# Patient Record
Sex: MALE | Race: WHITE | NOT HISPANIC OR LATINO | Employment: FULL TIME | URBAN - METROPOLITAN AREA
[De-identification: names, ages, dates, MRNs, and addresses within clinical notes are randomized per-mention and may not be internally consistent; named-entity substitution may affect disease eponyms.]

---

## 2017-08-22 ENCOUNTER — HOSPITAL ENCOUNTER (EMERGENCY)
Facility: HOSPITAL | Age: 37
Discharge: HOME | End: 2017-08-22
Attending: EMERGENCY MEDICINE
Payer: MEDICAID

## 2017-08-22 VITALS
DIASTOLIC BLOOD PRESSURE: 82 MMHG | HEIGHT: 72 IN | RESPIRATION RATE: 14 BRPM | SYSTOLIC BLOOD PRESSURE: 141 MMHG | TEMPERATURE: 98 F | OXYGEN SATURATION: 99 % | BODY MASS INDEX: 28.44 KG/M2 | HEART RATE: 75 BPM | WEIGHT: 210 LBS

## 2017-08-22 DIAGNOSIS — M75.42 IMPINGEMENT SYNDROME OF LEFT SHOULDER: Primary | ICD-10-CM

## 2017-08-22 DIAGNOSIS — M25.512 ACUTE PAIN OF LEFT SHOULDER: ICD-10-CM

## 2017-08-22 PROCEDURE — 96372 THER/PROPH/DIAG INJ SC/IM: CPT

## 2017-08-22 PROCEDURE — 63600175 PHARM REV CODE 636 W HCPCS: Performed by: PHYSICIAN ASSISTANT

## 2017-08-22 PROCEDURE — 99283 EMERGENCY DEPT VISIT LOW MDM: CPT | Mod: 25

## 2017-08-22 RX ORDER — CLONAZEPAM 2 MG/1
2 TABLET ORAL 2 TIMES DAILY
Status: ON HOLD | COMMUNITY
End: 2023-02-28 | Stop reason: HOSPADM

## 2017-08-22 RX ORDER — DICLOFENAC SODIUM 75 MG/1
75 TABLET, DELAYED RELEASE ORAL 2 TIMES DAILY
Qty: 30 TABLET | Refills: 0 | Status: SHIPPED | OUTPATIENT
Start: 2017-08-22 | End: 2017-10-03

## 2017-08-22 RX ORDER — KETOROLAC TROMETHAMINE 30 MG/ML
30 INJECTION, SOLUTION INTRAMUSCULAR; INTRAVENOUS
Status: COMPLETED | OUTPATIENT
Start: 2017-08-22 | End: 2017-08-22

## 2017-08-22 RX ORDER — TIZANIDINE 4 MG/1
4 TABLET ORAL EVERY 6 HOURS PRN
Qty: 12 TABLET | Refills: 0 | Status: SHIPPED | OUTPATIENT
Start: 2017-08-22 | End: 2017-09-01

## 2017-08-22 RX ORDER — GABAPENTIN 600 MG/1
600 TABLET ORAL 3 TIMES DAILY
COMMUNITY
End: 2020-09-03 | Stop reason: SDUPTHER

## 2017-08-22 RX ORDER — ORPHENADRINE CITRATE 30 MG/ML
60 INJECTION INTRAMUSCULAR; INTRAVENOUS
Status: COMPLETED | OUTPATIENT
Start: 2017-08-22 | End: 2017-08-22

## 2017-08-22 RX ORDER — BUPRENORPHINE AND NALOXONE 8; 2 MG/1; MG/1
FILM, SOLUBLE BUCCAL; SUBLINGUAL
Status: ON HOLD | COMMUNITY
End: 2023-02-28 | Stop reason: HOSPADM

## 2017-08-22 RX ADMIN — KETOROLAC TROMETHAMINE 30 MG: 30 INJECTION, SOLUTION INTRAMUSCULAR at 08:08

## 2017-08-22 RX ADMIN — ORPHENADRINE CITRATE 60 MG: 30 INJECTION INTRAMUSCULAR; INTRAVENOUS at 08:08

## 2017-08-23 NOTE — ED PROVIDER NOTES
Encounter Date: 8/22/2017       History     Chief Complaint   Patient presents with    Shoulder Pain      awoke with pain in L shoulder 1 week ago and pain persist unable to raise shoulder without pain; denies known injury or trauma     Elder Rosales is a 36 y.o. Male presenting for evaluation of left shoulder pain, persisting for the last week.  Patient states he has a history of previous shoulder problems and has worked in construction over the years.  He states that it was recommended he undergo shoulder surgery, for rotator cuff tear, but he declined.  He denies any new injury, trauma or fall associated with the shoulder pain today.  No fever, no chills.  No numbness, tingling or weakness.  He states he has increased pain with extension of the arm.  He has taken over-the-counter medication with little relief.      The history is provided by the patient.     Review of patient's allergies indicates:  No Known Allergies  Past Medical History:   Diagnosis Date    Anxiety     Carpal tunnel syndrome     Shoulder disorder      History reviewed. No pertinent surgical history.  No family history on file.  Social History   Substance Use Topics    Smoking status: Current Every Day Smoker    Smokeless tobacco: Never Used    Alcohol use Yes      Comment: 2-3x week     Review of Systems   Constitutional: Negative for chills and fever.   Respiratory: Negative for cough, chest tightness, shortness of breath and wheezing.    Cardiovascular: Negative for chest pain and palpitations.   Gastrointestinal: Negative for abdominal pain, diarrhea, nausea and vomiting.   Musculoskeletal: Positive for arthralgias and myalgias. Negative for back pain, joint swelling, neck pain and neck stiffness.   Skin: Negative for color change, pallor, rash and wound.   Neurological: Negative for weakness and numbness.   Hematological: Does not bruise/bleed easily.       Physical Exam     Initial Vitals [08/22/17 1929]   BP Pulse Resp Temp  SpO2   (!) 141/82 75 14 98 °F (36.7 °C) 99 %      MAP       101.67         Physical Exam    Nursing note and vitals reviewed.  Constitutional: He appears well-developed and well-nourished. He is not diaphoretic. No distress.   HENT:   Head: Normocephalic and atraumatic.   Neck: Normal range of motion. Neck supple.   Cardiovascular: Normal rate, regular rhythm, normal heart sounds and intact distal pulses.   Pulmonary/Chest: Breath sounds normal.   Abdominal: Soft. He exhibits no distension and no mass. There is no tenderness.   Musculoskeletal: He exhibits tenderness. He exhibits no edema.        Left shoulder: He exhibits decreased range of motion, tenderness and pain. He exhibits no bony tenderness, no swelling, no effusion, no crepitus, no deformity, no laceration, no spasm, normal pulse and normal strength.        Arms:  Tenderness palpation noted to the anterior aspect of the right shoulder without bony tenderness.  Decreased range of motion with full extension of the left arm, but no decreased strength or loss of sensation to left upper extremity.  Palpable 2+ radial pulse.   Neurological: He is alert and oriented to person, place, and time. He has normal strength. No sensory deficit.   Skin: Skin is warm and dry. No rash and no abscess noted. No erythema.         ED Course   Procedures  Labs Reviewed - No data to display          Medical Decision Making:   Differential Diagnosis:   Fracture  Dislocation  Impingement  Septic joint  Clinical Tests:   Radiological Study: Ordered and Reviewed       APC / Resident Notes:   X-rays are independently interpreted and show no acute abnormalities, fractures or dislocations in the left shoulder.  His symptoms are likely related to a rotator cuff deficiency and impingement syndrome.  He is given a dose of IM Toradol and Norflex here in the emergency department.  He is placed in a sling.  He is discharged home with a prescription for anti-inflammatories and muscle  relaxants.  No narcotics are indicated today.  He is encouraged follow-up with orthopedics for reevaluation of further treatment options.  He voices understanding and is agreeable to the plan.  He is given specific return precautions.  He will likely need follow-up with [facility] and referral sheet is faxed.              ED Course     Clinical Impression:   The primary encounter diagnosis was Impingement syndrome of left shoulder. A diagnosis of Acute pain of left shoulder was also pertinent to this visit.                           Dayanna Abrams PA-C  08/23/17 0120

## 2017-10-03 ENCOUNTER — HOSPITAL ENCOUNTER (OUTPATIENT)
Facility: HOSPITAL | Age: 37
Discharge: LEFT BEFORE DISCHARGE | End: 2017-10-04
Attending: EMERGENCY MEDICINE | Admitting: FAMILY MEDICINE
Payer: MEDICAID

## 2017-10-03 DIAGNOSIS — G92.8 DRUG-INDUCED ENCEPHALOPATHY: ICD-10-CM

## 2017-10-03 DIAGNOSIS — F19.10 POLYSUBSTANCE ABUSE: Primary | ICD-10-CM

## 2017-10-03 PROCEDURE — 99285 EMERGENCY DEPT VISIT HI MDM: CPT | Mod: 25

## 2017-10-03 PROCEDURE — 96360 HYDRATION IV INFUSION INIT: CPT

## 2017-10-03 PROCEDURE — 96361 HYDRATE IV INFUSION ADD-ON: CPT

## 2017-10-03 RX ORDER — OLANZAPINE 10 MG/2ML
10 INJECTION, POWDER, FOR SOLUTION INTRAMUSCULAR ONCE AS NEEDED
Status: DISCONTINUED | OUTPATIENT
Start: 2017-10-04 | End: 2017-10-04 | Stop reason: HOSPADM

## 2017-10-04 VITALS
TEMPERATURE: 97 F | SYSTOLIC BLOOD PRESSURE: 188 MMHG | RESPIRATION RATE: 16 BRPM | DIASTOLIC BLOOD PRESSURE: 97 MMHG | WEIGHT: 225 LBS | HEIGHT: 72 IN | BODY MASS INDEX: 30.48 KG/M2 | OXYGEN SATURATION: 99 % | HEART RATE: 87 BPM

## 2017-10-04 PROBLEM — R00.0 TACHYCARDIA: Status: RESOLVED | Noted: 2017-10-04 | Resolved: 2017-10-04

## 2017-10-04 PROBLEM — F19.921 DRUG INTOXICATION WITH DELIRIUM: Status: ACTIVE | Noted: 2017-10-04

## 2017-10-04 PROBLEM — R00.0 TACHYCARDIA: Status: ACTIVE | Noted: 2017-10-04

## 2017-10-04 PROBLEM — F11.221: Status: ACTIVE | Noted: 2017-10-04

## 2017-10-04 PROBLEM — B18.2 CHRONIC HEPATITIS C VIRUS INFECTION: Status: ACTIVE | Noted: 2017-10-04

## 2017-10-04 PROBLEM — F19.90 IVDU (INTRAVENOUS DRUG USER): Status: ACTIVE | Noted: 2017-10-04

## 2017-10-04 PROBLEM — F19.921 DRUG INTOXICATION WITH DELIRIUM: Status: RESOLVED | Noted: 2017-10-04 | Resolved: 2017-10-04

## 2017-10-04 PROBLEM — F19.10 POLYSUBSTANCE ABUSE: Status: ACTIVE | Noted: 2017-10-04

## 2017-10-04 PROBLEM — G92.8 DRUG-INDUCED ENCEPHALOPATHY: Status: RESOLVED | Noted: 2017-10-04 | Resolved: 2017-10-04

## 2017-10-04 PROBLEM — G92.8 DRUG-INDUCED ENCEPHALOPATHY: Status: ACTIVE | Noted: 2017-10-04

## 2017-10-04 LAB
ALBUMIN SERPL BCP-MCNC: 3.7 G/DL
ALBUMIN SERPL BCP-MCNC: 4.1 G/DL
ALP SERPL-CCNC: 92 U/L
ALP SERPL-CCNC: 98 U/L
ALT SERPL W/O P-5'-P-CCNC: 103 U/L
ALT SERPL W/O P-5'-P-CCNC: 106 U/L
AMMONIA PLAS-SCNC: 56 UMOL/L
AMPHET+METHAMPHET UR QL: NORMAL
ANION GAP SERPL CALC-SCNC: 12 MMOL/L
ANION GAP SERPL CALC-SCNC: 13 MMOL/L
APAP SERPL-MCNC: <3 UG/ML
AST SERPL-CCNC: 93 U/L
AST SERPL-CCNC: 94 U/L
BACTERIA #/AREA URNS HPF: NORMAL /HPF
BARBITURATES UR QL SCN>200 NG/ML: NEGATIVE
BASOPHILS # BLD AUTO: 0 K/UL
BASOPHILS # BLD AUTO: 0.1 K/UL
BASOPHILS NFR BLD: 0.5 %
BASOPHILS NFR BLD: 1.1 %
BENZODIAZ UR QL SCN>200 NG/ML: NORMAL
BILIRUB SERPL-MCNC: 3 MG/DL
BILIRUB SERPL-MCNC: 3.1 MG/DL
BILIRUB UR QL STRIP: ABNORMAL
BUN SERPL-MCNC: 11 MG/DL
BUN SERPL-MCNC: 13 MG/DL
BZE UR QL SCN: NEGATIVE
CALCIUM SERPL-MCNC: 9.3 MG/DL
CALCIUM SERPL-MCNC: 9.9 MG/DL
CANNABINOIDS UR QL SCN: NORMAL
CHLORIDE SERPL-SCNC: 103 MMOL/L
CHLORIDE SERPL-SCNC: 106 MMOL/L
CK SERPL-CCNC: 198 U/L
CLARITY UR: ABNORMAL
CO2 SERPL-SCNC: 22 MMOL/L
CO2 SERPL-SCNC: 25 MMOL/L
COLOR UR: ABNORMAL
CREAT SERPL-MCNC: 0.7 MG/DL
CREAT SERPL-MCNC: 0.9 MG/DL
CREAT UR-MCNC: 303 MG/DL
DIFFERENTIAL METHOD: ABNORMAL
DIFFERENTIAL METHOD: ABNORMAL
EOSINOPHIL # BLD AUTO: 0 K/UL
EOSINOPHIL # BLD AUTO: 0.1 K/UL
EOSINOPHIL NFR BLD: 0.3 %
EOSINOPHIL NFR BLD: 1.5 %
ERYTHROCYTE [DISTWIDTH] IN BLOOD BY AUTOMATED COUNT: 13.9 %
ERYTHROCYTE [DISTWIDTH] IN BLOOD BY AUTOMATED COUNT: 14 %
EST. GFR  (AFRICAN AMERICAN): >60 ML/MIN/1.73 M^2
EST. GFR  (AFRICAN AMERICAN): >60 ML/MIN/1.73 M^2
EST. GFR  (NON AFRICAN AMERICAN): >60 ML/MIN/1.73 M^2
EST. GFR  (NON AFRICAN AMERICAN): >60 ML/MIN/1.73 M^2
ETHANOL SERPL-MCNC: <10 MG/DL
GLUCOSE SERPL-MCNC: 83 MG/DL
GLUCOSE SERPL-MCNC: 97 MG/DL
GLUCOSE UR QL STRIP: NEGATIVE
HAV IGM SERPL QL IA: NEGATIVE
HBV CORE IGM SERPL QL IA: NEGATIVE
HBV SURFACE AG SERPL QL IA: NEGATIVE
HCT VFR BLD AUTO: 43.2 %
HCT VFR BLD AUTO: 43.3 %
HCV AB SERPL QL IA: POSITIVE
HGB BLD-MCNC: 14.5 G/DL
HGB BLD-MCNC: 14.7 G/DL
HGB UR QL STRIP: NEGATIVE
HIV1+2 IGG SERPL QL IA.RAPID: NEGATIVE
INR PPP: 1.1
KETONES UR QL STRIP: ABNORMAL
LEUKOCYTE ESTERASE UR QL STRIP: NEGATIVE
LYMPHOCYTES # BLD AUTO: 1.4 K/UL
LYMPHOCYTES # BLD AUTO: 1.7 K/UL
LYMPHOCYTES NFR BLD: 22 %
LYMPHOCYTES NFR BLD: 22 %
MAGNESIUM SERPL-MCNC: 2.1 MG/DL
MCH RBC QN AUTO: 29.6 PG
MCH RBC QN AUTO: 29.9 PG
MCHC RBC AUTO-ENTMCNC: 33.5 G/DL
MCHC RBC AUTO-ENTMCNC: 33.9 G/DL
MCV RBC AUTO: 87 FL
MCV RBC AUTO: 89 FL
METHADONE UR QL SCN>300 NG/ML: NEGATIVE
MICROSCOPIC COMMENT: NORMAL
MONOCYTES # BLD AUTO: 0.4 K/UL
MONOCYTES # BLD AUTO: 0.5 K/UL
MONOCYTES NFR BLD: 6.1 %
MONOCYTES NFR BLD: 6.5 %
NEUTROPHILS # BLD AUTO: 4.4 K/UL
NEUTROPHILS # BLD AUTO: 5.4 K/UL
NEUTROPHILS NFR BLD: 69.5 %
NEUTROPHILS NFR BLD: 70.5 %
NITRITE UR QL STRIP: POSITIVE
OPIATES UR QL SCN: NORMAL
PCP UR QL SCN>25 NG/ML: NEGATIVE
PH UR STRIP: 6 [PH] (ref 5–8)
PHOSPHATE SERPL-MCNC: 3.9 MG/DL
PLATELET # BLD AUTO: 160 K/UL
PLATELET # BLD AUTO: 188 K/UL
PMV BLD AUTO: 8.5 FL
PMV BLD AUTO: 8.9 FL
POTASSIUM SERPL-SCNC: 3.7 MMOL/L
POTASSIUM SERPL-SCNC: 3.8 MMOL/L
PROT SERPL-MCNC: 7 G/DL
PROT SERPL-MCNC: 7.6 G/DL
PROT UR QL STRIP: ABNORMAL
PROTHROMBIN TIME: 11.9 SEC
RBC # BLD AUTO: 4.86 M/UL
RBC # BLD AUTO: 4.97 M/UL
SALICYLATES SERPL-MCNC: <5 MG/DL
SODIUM SERPL-SCNC: 140 MMOL/L
SODIUM SERPL-SCNC: 141 MMOL/L
SP GR UR STRIP: >=1.03 (ref 1–1.03)
TOXICOLOGY INFORMATION: NORMAL
URN SPEC COLLECT METH UR: ABNORMAL
UROBILINOGEN UR STRIP-ACNC: ABNORMAL EU/DL
WBC # BLD AUTO: 6.4 K/UL
WBC # BLD AUTO: 7.7 K/UL

## 2017-10-04 PROCEDURE — 93005 ELECTROCARDIOGRAM TRACING: CPT

## 2017-10-04 PROCEDURE — 83735 ASSAY OF MAGNESIUM: CPT

## 2017-10-04 PROCEDURE — 80074 ACUTE HEPATITIS PANEL: CPT

## 2017-10-04 PROCEDURE — 85025 COMPLETE CBC W/AUTO DIFF WBC: CPT | Mod: 91

## 2017-10-04 PROCEDURE — 96361 HYDRATE IV INFUSION ADD-ON: CPT

## 2017-10-04 PROCEDURE — 80053 COMPREHEN METABOLIC PANEL: CPT

## 2017-10-04 PROCEDURE — G0378 HOSPITAL OBSERVATION PER HR: HCPCS

## 2017-10-04 PROCEDURE — 81000 URINALYSIS NONAUTO W/SCOPE: CPT

## 2017-10-04 PROCEDURE — 93010 ELECTROCARDIOGRAM REPORT: CPT | Mod: ,,, | Performed by: INTERNAL MEDICINE

## 2017-10-04 PROCEDURE — 25000003 PHARM REV CODE 250: Performed by: NURSE PRACTITIONER

## 2017-10-04 PROCEDURE — 96366 THER/PROPH/DIAG IV INF ADDON: CPT

## 2017-10-04 PROCEDURE — 96365 THER/PROPH/DIAG IV INF INIT: CPT

## 2017-10-04 PROCEDURE — 36415 COLL VENOUS BLD VENIPUNCTURE: CPT

## 2017-10-04 PROCEDURE — 80307 DRUG TEST PRSMV CHEM ANLYZR: CPT

## 2017-10-04 PROCEDURE — 84100 ASSAY OF PHOSPHORUS: CPT

## 2017-10-04 PROCEDURE — 85610 PROTHROMBIN TIME: CPT

## 2017-10-04 PROCEDURE — 82140 ASSAY OF AMMONIA: CPT

## 2017-10-04 PROCEDURE — 82550 ASSAY OF CK (CPK): CPT

## 2017-10-04 PROCEDURE — 80329 ANALGESICS NON-OPIOID 1 OR 2: CPT

## 2017-10-04 PROCEDURE — 80053 COMPREHEN METABOLIC PANEL: CPT | Mod: 91

## 2017-10-04 PROCEDURE — 80320 DRUG SCREEN QUANTALCOHOLS: CPT

## 2017-10-04 PROCEDURE — 25000003 PHARM REV CODE 250: Performed by: EMERGENCY MEDICINE

## 2017-10-04 PROCEDURE — 86703 HIV-1/HIV-2 1 RESULT ANTBDY: CPT

## 2017-10-04 RX ORDER — AMOXICILLIN 250 MG
1 CAPSULE ORAL 2 TIMES DAILY
Status: DISCONTINUED | OUTPATIENT
Start: 2017-10-04 | End: 2017-10-04 | Stop reason: HOSPADM

## 2017-10-04 RX ORDER — LISINOPRIL 10 MG/1
10 TABLET ORAL DAILY
Qty: 30 TABLET | Refills: 0 | Status: SHIPPED | OUTPATIENT
Start: 2017-10-05 | End: 2018-05-20

## 2017-10-04 RX ORDER — LISINOPRIL 10 MG/1
10 TABLET ORAL DAILY
Status: DISCONTINUED | OUTPATIENT
Start: 2017-10-04 | End: 2017-10-04 | Stop reason: HOSPADM

## 2017-10-04 RX ORDER — LANOLIN ALCOHOL/MO/W.PET/CERES
800 CREAM (GRAM) TOPICAL
Status: DISCONTINUED | OUTPATIENT
Start: 2017-10-04 | End: 2017-10-04 | Stop reason: HOSPADM

## 2017-10-04 RX ORDER — POTASSIUM CHLORIDE 20 MEQ/15ML
40 SOLUTION ORAL
Status: DISCONTINUED | OUTPATIENT
Start: 2017-10-04 | End: 2017-10-04 | Stop reason: HOSPADM

## 2017-10-04 RX ORDER — LISINOPRIL 10 MG/1
10 TABLET ORAL DAILY
Qty: 30 TABLET | Refills: 0 | Status: SHIPPED | OUTPATIENT
Start: 2017-10-04 | End: 2017-10-04

## 2017-10-04 RX ORDER — ACETAMINOPHEN 325 MG/1
650 TABLET ORAL EVERY 6 HOURS PRN
COMMUNITY
Start: 2017-10-04 | End: 2018-05-20

## 2017-10-04 RX ORDER — ACETAMINOPHEN 500 MG
1000 TABLET ORAL EVERY 6 HOURS PRN
Status: DISCONTINUED | OUTPATIENT
Start: 2017-10-04 | End: 2017-10-04 | Stop reason: HOSPADM

## 2017-10-04 RX ORDER — SODIUM CHLORIDE 9 MG/ML
INJECTION, SOLUTION INTRAVENOUS CONTINUOUS
Status: DISCONTINUED | OUTPATIENT
Start: 2017-10-04 | End: 2017-10-04

## 2017-10-04 RX ORDER — ONDANSETRON 2 MG/ML
8 INJECTION INTRAMUSCULAR; INTRAVENOUS EVERY 8 HOURS PRN
Status: DISCONTINUED | OUTPATIENT
Start: 2017-10-04 | End: 2017-10-04 | Stop reason: HOSPADM

## 2017-10-04 RX ORDER — POTASSIUM CHLORIDE 20 MEQ/15ML
60 SOLUTION ORAL
Status: DISCONTINUED | OUTPATIENT
Start: 2017-10-04 | End: 2017-10-04 | Stop reason: HOSPADM

## 2017-10-04 RX ORDER — CLONIDINE HYDROCHLORIDE 0.1 MG/1
0.1 TABLET ORAL 2 TIMES DAILY
Status: DISCONTINUED | OUTPATIENT
Start: 2017-10-04 | End: 2017-10-04 | Stop reason: HOSPADM

## 2017-10-04 RX ADMIN — SODIUM CHLORIDE 1000 ML: 0.9 INJECTION, SOLUTION INTRAVENOUS at 12:10

## 2017-10-04 RX ADMIN — ACETAMINOPHEN 1000 MG: 500 TABLET ORAL at 07:10

## 2017-10-04 RX ADMIN — STANDARDIZED SENNA CONCENTRATE AND DOCUSATE SODIUM 1 TABLET: 8.6; 5 TABLET, FILM COATED ORAL at 11:10

## 2017-10-04 RX ADMIN — SODIUM CHLORIDE: 0.9 INJECTION, SOLUTION INTRAVENOUS at 03:10

## 2017-10-04 NOTE — SUBJECTIVE & OBJECTIVE
Past Medical History:   Diagnosis Date    Anxiety     Carpal tunnel syndrome     Shoulder disorder        History reviewed. No pertinent surgical history.    Review of patient's allergies indicates:  No Known Allergies    No current facility-administered medications on file prior to encounter.      Current Outpatient Prescriptions on File Prior to Encounter   Medication Sig    buprenorphine-naloxone (SUBOXONE) 8-2 mg Film Place under the tongue.    clonazePAM (KLONOPIN) 2 MG Tab Take 2 mg by mouth 2 (two) times daily.    gabapentin (NEURONTIN) 600 MG tablet Take 600 mg by mouth 3 (three) times daily.     Family History     Problem Relation (Age of Onset)    No Known Problems Mother, Father        Social History Main Topics    Smoking status: Current Every Day Smoker    Smokeless tobacco: Never Used    Alcohol use Yes      Comment: 2-3x week    Drug use: No    Sexual activity: Not on file     Review of Systems   Unable to perform ROS: Mental status change     Objective:     Vital Signs (Most Recent):  Temp: 97.6 °F (36.4 °C) (10/03/17 2328)  Pulse: (!) 134 (10/03/17 2328)  Resp: 16 (10/03/17 2328)  BP: 118/82 (10/03/17 2328)  SpO2: 96 % (10/03/17 2328) Vital Signs (24h Range):  Temp:  [97.6 °F (36.4 °C)] 97.6 °F (36.4 °C)  Pulse:  [134] 134  Resp:  [16] 16  SpO2:  [96 %] 96 %  BP: (118)/(82) 118/82     Weight: 102.1 kg (225 lb)  Body mass index is 30.52 kg/m².    Physical Exam   Constitutional: He appears well-developed and well-nourished. No distress.   HENT:   Head: Normocephalic and atraumatic.   Eyes: Conjunctivae and EOM are normal. Pupils are equal, round, and reactive to light.   Pupils 4 mm, reactive.   Neck: Normal range of motion. Neck supple. No thyromegaly present.   Cardiovascular: Regular rhythm, normal heart sounds and intact distal pulses.  Tachycardia present.    Pulmonary/Chest: Effort normal and breath sounds normal. No respiratory distress.   Abdominal: Soft. Bowel sounds are normal. He  exhibits no distension. There is no tenderness.   Musculoskeletal: Normal range of motion. He exhibits no edema or tenderness.   Neurological:   Sedate; arouses to verbal stimuli.  Oriented to self; disoriented to place, time or situation.     Skin: Skin is warm and dry. Capillary refill takes less than 2 seconds. No erythema.   Track marks BUEs   Psychiatric:   Chemically sedate; unable to assess appropriately.        Significant Labs:   CBC:   Recent Labs  Lab 10/04/17  0007   WBC 7.70   HGB 14.7   HCT 43.3        CMP:   Recent Labs  Lab 10/04/17  0007      K 3.8      CO2 25   GLU 97   BUN 13   CREATININE 0.9   CALCIUM 9.9   PROT 7.6   ALBUMIN 4.1   BILITOT 3.1*   ALKPHOS 98   AST 93*   *   ANIONGAP 12   EGFRNONAA >60      Ref. Range 10/4/2017 00:07   CPK Latest Ref Range: 20 - 200 U/L 198        Ref. Range 10/4/2017 00:30   Benzodiazepines Unknown Presumptive Positive   Methadone metabolites Unknown Negative   Phencyclidine Unknown Negative   Cocaine (Metab.) Unknown Negative   Opiate Scrn, Ur Unknown Presumptive Positive   Barbiturate Screen, Ur Unknown Negative   Amphetamine Screen, Ur Unknown Presumptive Positive   Marijuana (THC) Metabolite Unknown Presumptive Positive

## 2017-10-04 NOTE — PLAN OF CARE
1202  Out of room at this time.  Placed resources for outpatient drug rehab and inpatient drug rehab in pt's discharge folder.       10/04/17 1222   Discharge Assessment   Assessment Type Discharge Planning Assessment

## 2017-10-04 NOTE — PLAN OF CARE
10/04/17 1254   Final Note   Assessment Type Final Discharge Note   Discharge Disposition Left Against     Patient left without d/c instructions and drug resources.  Per nurse pt's IV was found laying on his bed.

## 2017-10-04 NOTE — ASSESSMENT & PLAN NOTE
Patient disclosed Hep C status to ED physician with plan for Harvoni therapy.  Follow up upon discharge.

## 2017-10-04 NOTE — ED PROVIDER NOTES
Encounter Date: 10/3/2017    SCRIBE #1 NOTE: I, Ryan Bell, am scribing for, and in the presence of, Dr. eMek .       History     Chief Complaint   Patient presents with    Psychiatric Evaluation     family reports pt taking more than normal klonopin and meth. Pt denies meth but did tell me he took about 4 klonopin. EMS reports pt on ground talking to a tress upon their arrival       10/03/2017 11:30 PM     Chief complaint: Psychiatric Evaluation       Elder Rosales is a 36 y.o. male with a hx of Carpal tunnel syndrome, anxiety, and shoulder disorder who presents to the ED via EMS for psychiatric evaluation. EMT notes, pt was on the ground talking to trees upon their arrival. He reports IV Heroin use and denies Meth use. Pt has 1 mg Klonopin, 50 dispensed 5 days ago and has 5 left. He has NKDA.  He is a poor historian for remote and recent events.      The history is provided by the patient.     Review of patient's allergies indicates:  No Known Allergies  Past Medical History:   Diagnosis Date    Anxiety     Carpal tunnel syndrome     Shoulder disorder      History reviewed. No pertinent surgical history.  History reviewed. No pertinent family history.  Social History   Substance Use Topics    Smoking status: Current Every Day Smoker    Smokeless tobacco: Never Used    Alcohol use Yes      Comment: 2-3x week     Review of Systems   Unable to perform ROS: Mental status change       Physical Exam     Initial Vitals [10/03/17 2328]   BP Pulse Resp Temp SpO2   118/82 (!) 134 16 97.6 °F (36.4 °C) 96 %      MAP       94         Physical Exam    Vitals reviewed.  Constitutional: He appears well-developed and well-nourished.   Pt is diaphoretic.    HENT:   Head: Normocephalic and atraumatic.   Eyes: Conjunctivae and EOM are normal. Pupils are equal, round, and reactive to light.   Enlarged pupils.    Neck: Normal range of motion. Neck supple. No thyroid mass present.   Cardiovascular: Exam reveals no  gallop and no friction rub.    No murmur heard.  Tachycardic and regular   Pulmonary/Chest: Breath sounds normal. He has no wheezes. He has no rhonchi. He has no rales.   Abdominal: Soft. Normal appearance and bowel sounds are normal. There is no tenderness.   Musculoskeletal: Normal range of motion.   Track marks on BUE.   Neurological: He is alert and oriented to person, place, and time. He has normal strength. No cranial nerve deficit or sensory deficit.   Skin: Skin is warm and dry. No rash noted. No erythema.   Psychiatric: He has a normal mood and affect. His speech is tangential and slurred. Cognition and memory are normal.         ED Course   Procedures  Labs Reviewed   CBC W/ AUTO DIFFERENTIAL - Abnormal; Notable for the following:        Result Value    MPV 8.5 (*)     All other components within normal limits   COMPREHENSIVE METABOLIC PANEL - Abnormal; Notable for the following:     Total Bilirubin 3.1 (*)     AST 93 (*)      (*)     All other components within normal limits   DRUG SCREEN PANEL, URINE EMERGENCY   CK   URINALYSIS             Medical Decision Making:   Initial Assessment:   This patient was interviewed and examined immediately upon arrival with EMS.  He cannot contribute any significant elements of his recent or past history.  He does appear to be acutely intoxicated on was suspected be multiple illicit substances.  IV was established and he was provided bolus hydration.  He will be monitored in the emergency department and was provided intramuscular Zyprexa for psychomotor agitation.   ED Management:  Workup today significant for evidence of positive benzodiazepine, opiate, amphetamine, and THC screen.  Additional labs are found to be unremarkable for evidence of progressing infection, inflammation, endorgan damage.  Patient was noted to be sleeping quietly on reassessment but does warrant observation until his mental status normalizes.  Case was discussed with Mrs. Doty who agreed  to admit the patient.  He'll be transferred to a telemetry bed in guarded condition.  Of note, the patient has not endorsed any suicidal ideation, attempt, or homicidal ideation accompanying his current complaints tonight.            Scribe Attestation:   Scribe #1: I performed the above scribed service and the documentation accurately describes the services I performed. I attest to the accuracy of the note.    Attending Attestation:         Attending Critical Care:   Critical Care Times:   Direct Patient Care (initial evaluation, reassessments, and time considering the case)................................................................10 minutes.   Additional History from reviewing old medical records or taking additional history from the family, EMS, PCP, etc.......................5 minutes.   Ordering, Reviewing, and Interpreting Diagnostic Studies...............................................................................................................5 minutes.   Documentation..................................................................................................................................................................................5 minutes.   Consultation with other Physicians. .................................................................................................................................................0 minutes.   Consultation with the patient's family directly relating to the patient's condition, care, and DNR status (when patient unable)......0 minutes.   Other..................................................................................................................................................................................................10 minutes.   ==============================================================  · Total Critical Care Time - exclusive of procedural time: 35  minutes.  ==============================================================  Critical care was necessary to treat or prevent imminent or life-threatening deterioration of the following conditions: overdose.   The following critical care procedures were done by me (see procedure notes): pulse oximetry.   Critical care was time spent personally by me on the following activities: examination of patient, review of old charts, ordering lab, x-rays, and/or EKG, ordering and performing treatments and interventions, evaluation of patient's response to treatment, discussions with primary provider, interpretation of cardiac measurements and re-evaluation of patient's conition.   Critical Care Condition: potentially life-threatening     Physician Attestation for Scribe:  Physician Attestation Statement for Scribe #1: I, Dr. Meek , reviewed documentation, as scribed by Ryan Bell in my presence, and it is both accurate and complete.                 ED Course      Clinical Impression:     1. Polysubstance abuse    2. Drug-induced encephalopathy        Disposition:   Disposition: Placed in Observation  Condition: Hansel Meek MD  10/04/17 0144

## 2017-10-04 NOTE — HOSPITAL COURSE
He was monitored overnight. His mental condition improved and patient was back to his baseline. His medical condition remained stable and improved.  were consulted to discuss possible treatment options for substance abuse. Patient remained alert and oriented to person, place, time, and situation and was responding appropriately to simple commands. The patient denied any homicidal or suicidal ideations.  Patient educated on importance of following his medical regimen and also on importance of no substance abuse including recreational drugs. Patient verbalized understanding. He ate 100 % of his breakfast and he was discharged to home.

## 2017-10-04 NOTE — ASSESSMENT & PLAN NOTE
Requiring antipsychotic in ED.  Admit for observation, serial neuro checks.  Fall/seizure precautions. IV hydration. Will utilize antipsychotics as required.

## 2017-10-04 NOTE — H&P
[facility] Hospital Medicine  History & Physical    Patient Name: Elder Rosales  MRN: [id number]  Admission Date: 10/3/2017  Attending Physician: Quincy Meek MD   Primary Care Provider: MAGALIE Lau         Patient information was obtained from ER records.     Subjective:     Principal Problem:Drug-induced encephalopathy    Chief Complaint:   Chief Complaint   Patient presents with    Psychiatric Evaluation     family reports pt taking more than normal klonopin and meth. Pt denies meth but did tell me he took about 4 klonopin. EMS reports pt on ground talking to a tress upon their arrival        HPI: Elder Rosales is a 36 y.o. male with a hx of Carpal tunnel syndrome, anxiety, and Hep C.  He was admitted to the service of hospital medicine with drug-induced encephalopathy.  He was brought to the ED via EMS for psychiatric evaluation. EMT notes, pt was on the ground talking to trees upon their arrival. He reports IV Heroin use and denies Meth use. Pt has 1 mg Klonopin, 50 dispensed 5 days ago and has 5 left. His family reportedly told EMS he is using more meth and klonopin than usual. He is a poor historian for remote and recent events.  He was apparently exhibiting tangential speech and flight of ideas in ED requiring antipsychotic administration.  He denied and HI/SI at that time.  Upon my evaluation, patient is sedate, but arousable.  Admits to taking klonopin and reports history of IVDU but does not elaborate further.  He is unable to provide any family history at this time.    Past Medical History:   Diagnosis Date    Anxiety     Carpal tunnel syndrome     Shoulder disorder        History reviewed. No pertinent surgical history.    Review of patient's allergies indicates:  No Known Allergies    No current facility-administered medications on file prior to encounter.      Current Outpatient Prescriptions on File Prior to Encounter   Medication Sig     buprenorphine-naloxone (SUBOXONE) 8-2 mg Film Place under the tongue.    clonazePAM (KLONOPIN) 2 MG Tab Take 2 mg by mouth 2 (two) times daily.    gabapentin (NEURONTIN) 600 MG tablet Take 600 mg by mouth 3 (three) times daily.     Family History     Problem Relation (Age of Onset)    No Known Problems Mother, Father        Social History Main Topics    Smoking status: Current Every Day Smoker    Smokeless tobacco: Never Used    Alcohol use Yes      Comment: 2-3x week    Drug use: No    Sexual activity: Not on file     Review of Systems   Unable to perform ROS: Mental status change     Objective:     Vital Signs (Most Recent):  Temp: 97.6 °F (36.4 °C) (10/03/17 2328)  Pulse: (!) 134 (10/03/17 2328)  Resp: 16 (10/03/17 2328)  BP: 118/82 (10/03/17 2328)  SpO2: 96 % (10/03/17 2328) Vital Signs (24h Range):  Temp:  [97.6 °F (36.4 °C)] 97.6 °F (36.4 °C)  Pulse:  [134] 134  Resp:  [16] 16  SpO2:  [96 %] 96 %  BP: (118)/(82) 118/82     Weight: 102.1 kg (225 lb)  Body mass index is 30.52 kg/m².    Physical Exam   Constitutional: He appears well-developed and well-nourished. No distress.   HENT:   Head: Normocephalic and atraumatic.   Eyes: Conjunctivae and EOM are normal. Pupils are equal, round, and reactive to light.   Pupils 4 mm, reactive.   Neck: Normal range of motion. Neck supple. No thyromegaly present.   Cardiovascular: Regular rhythm, normal heart sounds and intact distal pulses.  Tachycardia present.    Pulmonary/Chest: Effort normal and breath sounds normal. No respiratory distress.   Abdominal: Soft. Bowel sounds are normal. He exhibits no distension. There is no tenderness.   Musculoskeletal: Normal range of motion. He exhibits no edema or tenderness.   Neurological:   Sedate; arouses to verbal stimuli.  Oriented to self; disoriented to place, time or situation.     Skin: Skin is warm and dry. Capillary refill takes less than 2 seconds. No erythema.   Track marks BUEs   Psychiatric:   Chemically sedate;  unable to assess appropriately.        Significant Labs:   CBC:   Recent Labs  Lab 10/04/17  0007   WBC 7.70   HGB 14.7   HCT 43.3        CMP:   Recent Labs  Lab 10/04/17  0007      K 3.8      CO2 25   GLU 97   BUN 13   CREATININE 0.9   CALCIUM 9.9   PROT 7.6   ALBUMIN 4.1   BILITOT 3.1*   ALKPHOS 98   AST 93*   *   ANIONGAP 12   EGFRNONAA >60      Ref. Range 10/4/2017 00:07   CPK Latest Ref Range: 20 - 200 U/L 198        Ref. Range 10/4/2017 00:30   Benzodiazepines Unknown Presumptive Positive   Methadone metabolites Unknown Negative   Phencyclidine Unknown Negative   Cocaine (Metab.) Unknown Negative   Opiate Scrn, Ur Unknown Presumptive Positive   Barbiturate Screen, Ur Unknown Negative   Amphetamine Screen, Ur Unknown Presumptive Positive   Marijuana (THC) Metabolite Unknown Presumptive Positive         Assessment/Plan:     * Drug-induced encephalopathy    Requiring antipsychotic in ED.  Admit for observation, serial neuro checks.  Fall/seizure precautions. IV hydration. Will utilize antipsychotics as required.          -Drug intoxication with delirium  -Polysubstance dependence including opioid type drug, episodic abuse, with delirium    Polypharmacy abuse with acute intoxication and delirium requiring antipsychotic administration.  Close monitoring, neuro checks, fall/seizure precautions.  Monitor closely for symptoms of substance withdrawal.          Polysubstance abuse     consulted for resources on drug cessation once more alert.          Tachycardia    Associated with amphetamine use.  Monitor on telemetry.  Hydrating.          IVDU (intravenous drug user)    Check acute hepatitis panel and rapid HIV.   to avoid illicits (once more alert and receptive).          Chronic hepatitis C virus infection    Patient disclosed Hep C status to ED physician with plan for Harvoni therapy.  Follow up upon discharge.            VTE Risk Mitigation     None              Corinne Doty NP  Department of Hospital Medicine   [facility]

## 2017-10-04 NOTE — ED NOTES
Assumed care of pt who is currently sleeping but easily awakened with verbal stimuli and no distress noted.  Pt compliant with all request.  Will continue to monitor.

## 2017-10-04 NOTE — HPI
Elder Rosales is a 36 y.o. male with a hx of Carpal tunnel syndrome, anxiety, and Hep C.  He was admitted to the service of hospital medicine with drug-induced encephalopathy.  He was brought to the ED via EMS for psychiatric evaluation. EMT notes, pt was on the ground talking to trees upon their arrival. He reports IV Heroin use and denies Meth use. Pt has 1 mg Klonopin, 50 dispensed 5 days ago and has 5 left. His family reportedly told EMS he is using more meth and klonopin than usual. He is a poor historian for remote and recent events.  He was apparently exhibiting tangential speech and flight of ideas in ED requiring antipsychotic administration.  He denied and HI/SI at that time.  Upon my evaluation, patient is sedate, but arousable.  Admits to taking klonopin and reports history of IVDU but does not elaborate further.  He is unable to provide any family history at this time. Patient was placed in hospital for further evaluation and treatment.

## 2017-10-04 NOTE — PROGRESS NOTES
Called and left message for pt to come  discharge papers, Rx, rehab information, and ID that housekeeping found in bed.

## 2017-10-04 NOTE — PROGRESS NOTES
Tele box found on bed. IV in trash with tip intact. Pt left without discharge instruction, Rx, and information on drug rehab. Notified Isadora GUSMAN. Will attempt to pt's cell phone to see if he would like to come back to  information.

## 2017-10-04 NOTE — ASSESSMENT & PLAN NOTE
Polypharmacy abuse with acute intoxication and delirium requiring antipsychotic administration.  Close monitoring, neuro checks, fall/seizure precautions.  Monitor closely for symptoms of substance withdrawal.

## 2017-10-04 NOTE — DISCHARGE INSTRUCTIONS
Thank you for choosing [facility] for your medical care. The primary doctor who is taking care of you at the time of your discharge is Nelly Womack MD.     You were admitted to the hospital with Drug-induced encephalopathy.     Please note your discharge instructions, including diet/activity restrictions, follow-up appointments, and medication changes.  If you have any questions about your medical issues, prescriptions, or any other questions, please feel free to contact the [facility] Medicine Dept at [phone] and we will help.    Please direct all long term medication refills and follow up to your primary care provider, MAGALIE Lau. Thank you again for letting us take care of your health care needs.    Please note the following discharge instructions per your discharging physician-  Isadora Fagan Np

## 2017-10-04 NOTE — ED NOTES
Pt departed ED for transport to floor with all personal property, ABC's intact, awake and responsive to verbal stimuli.  No distress noted on departure.

## 2017-10-04 NOTE — DISCHARGE SUMMARY
[facility]  Hospital Medicine  Discharge Summary      Patient Name: Elder Rosales  MRN: [id number]  Admission Date: 10/3/2017  Hospital Length of Stay: 0 days  Discharge Date and Time:  10/04/2017 12:01 PM  Attending Physician: Nelly Womack MD   Discharging Provider: MAGALIE Elizondo  Primary Care Provider: MAGALIE Lau      HPI:   Elder Rosales is a 36 y.o. male with a hx of Carpal tunnel syndrome, anxiety, and Hep C.  He was admitted to the service of hospital medicine with drug-induced encephalopathy.  He was brought to the ED via EMS for psychiatric evaluation. EMT notes, pt was on the ground talking to trees upon their arrival. He reports IV Heroin use and denies Meth use. Pt has 1 mg Klonopin, 50 dispensed 5 days ago and has 5 left. His family reportedly told EMS he is using more meth and klonopin than usual. He is a poor historian for remote and recent events.  He was apparently exhibiting tangential speech and flight of ideas in ED requiring antipsychotic administration.  He denied and HI/SI at that time.  Upon my evaluation, patient is sedate, but arousable.  Admits to taking klonopin and reports history of IVDU but does not elaborate further.  He is unable to provide any family history at this time. Patient was placed in hospital for further evaluation and treatment.    * No surgery found *      Indwelling Lines/Drains at time of discharge:   Lines/Drains/Airways          No matching active lines, drains, or airways        Hospital Course:   He was monitored overnight. His mental condition improved and patient was back to his baseline. His medical condition remained stable and improved.  were consulted to discuss possible treatment options for substance abuse. Patient remained alert and oriented to person, place, time, and situation and was responding appropriately to simple commands. The patient denied any homicidal or suicidal ideations.   Patient educated on importance of following his medical regimen and also on importance of no substance abuse including recreational drugs. Patient verbalized understanding. He ate 100 % of his breakfast and he was discharged to home.     Consults:   Consults         Status Ordering Provider     Inpatient consult to Social Work/Case Management  Once     Provider:  (Not yet assigned)    Acknowledged YRIS LOVE          Significant Diagnostic Studies: Labs:   CMP   Recent Labs  Lab 10/04/17  0007 10/04/17  0436    141   K 3.8 3.7    106   CO2 25 22*   GLU 97 83   BUN 13 11   CREATININE 0.9 0.7   CALCIUM 9.9 9.3   PROT 7.6 7.0   ALBUMIN 4.1 3.7   BILITOT 3.1* 3.0*   ALKPHOS 98 92   AST 93* 94*   * 103*   ANIONGAP 12 13   ESTGFRAFRICA >60 >60   EGFRNONAA >60 >60    and CBC   Recent Labs  Lab 10/04/17  0007 10/04/17  0435   WBC 7.70 6.40   HGB 14.7 14.5   HCT 43.3 43.2    160       Pending Diagnostic Studies:     Procedure Component Value Units Date/Time    Hepatitis panel, acute [[id number]] Collected:  10/04/17 0435    Order Status:  Sent Lab Status:  In process Updated:  10/04/17 1031    Specimen:  Blood from Blood         Final Active Diagnoses:    Diagnosis Date Noted POA    Polysubstance dependence including opioid type drug, episodic abuse, with delirium [F11.221] 10/04/2017 Yes    Polysubstance abuse [F19.10] 10/04/2017 Yes    Chronic hepatitis C virus infection [B18.2] 10/04/2017 Yes    IVDU (intravenous drug user) [F19.90] 10/04/2017 Yes      Problems Resolved During this Admission:    Diagnosis Date Noted Date Resolved POA    PRINCIPAL PROBLEM:  Drug-induced encephalopathy [G92] 10/04/2017 10/04/2017 Yes    Drug intoxication with delirium [F19.921] 10/04/2017 10/04/2017 Yes    Tachycardia [R00.0] 10/04/2017 10/04/2017 Yes      No new Assessment & Plan notes have been filed under this hospital service since the last note was generated.  Service: [facility]  Medicine      Discharged Condition: stable    Disposition: Home or Self Care    Follow Up:  Follow-up Information     MAGALIE Lau In 1 week.    Specialty:  Family Medicine  Why:  Take blood pressure and pulse 2 x day and keep log for follow up with PCP   Contact information:  [address]  [facility]  [place] [id number]  [phone]                 Patient Instructions:     Diet general   Order Comments: Cardiac diet     Sponge bath only until clinic visit       Medications:  Reconciled Home Medications:   Current Discharge Medication List      START taking these medications    Details   acetaminophen (TYLENOL) 325 MG tablet Take 2 tablets (650 mg total) by mouth every 6 (six) hours as needed.      lisinopril 10 MG tablet Take 1 tablet (10 mg total) by mouth once daily.  Qty: 30 tablet, Refills: 0         CONTINUE these medications which have NOT CHANGED    Details   buprenorphine-naloxone (SUBOXONE) 8-2 mg Film Place under the tongue.      clonazePAM (KLONOPIN) 2 MG Tab Take 2 mg by mouth 2 (two) times daily.      gabapentin (NEURONTIN) 600 MG tablet Take 600 mg by mouth 3 (three) times daily.         STOP taking these medications       diclofenac (VOLTAREN) 75 MG EC tablet Comments:   Reason for Stopping:             Time spent on the discharge of patient: 48 minutes      MAGALIE Elizondo  Department of Hospital Medicine  [facility]

## 2017-10-05 ENCOUNTER — TELEPHONE (OUTPATIENT)
Dept: MEDSURG UNIT | Facility: HOSPITAL | Age: 37
End: 2017-10-05

## 2018-02-25 ENCOUNTER — HOSPITAL ENCOUNTER (EMERGENCY)
Facility: HOSPITAL | Age: 38
Discharge: HOME | End: 2018-02-25
Attending: EMERGENCY MEDICINE
Payer: MEDICAID

## 2018-02-25 VITALS
HEIGHT: 72 IN | TEMPERATURE: 100 F | BODY MASS INDEX: 27.09 KG/M2 | RESPIRATION RATE: 20 BRPM | SYSTOLIC BLOOD PRESSURE: 149 MMHG | OXYGEN SATURATION: 97 % | WEIGHT: 200 LBS | DIASTOLIC BLOOD PRESSURE: 81 MMHG | HEART RATE: 95 BPM

## 2018-02-25 DIAGNOSIS — L02.414 ABSCESS OF LEFT ARM: Primary | ICD-10-CM

## 2018-02-25 PROCEDURE — 25000003 PHARM REV CODE 250: Performed by: PHYSICIAN ASSISTANT

## 2018-02-25 PROCEDURE — 99283 EMERGENCY DEPT VISIT LOW MDM: CPT | Mod: 25

## 2018-02-25 PROCEDURE — 10061 I&D ABSCESS COMP/MULTIPLE: CPT

## 2018-02-25 RX ORDER — LIDOCAINE HYDROCHLORIDE 10 MG/ML
10 INJECTION INFILTRATION; PERINEURAL
Status: COMPLETED | OUTPATIENT
Start: 2018-02-25 | End: 2018-02-25

## 2018-02-25 RX ORDER — DOXYCYCLINE 100 MG/1
100 CAPSULE ORAL 2 TIMES DAILY
Qty: 20 CAPSULE | Refills: 0 | Status: SHIPPED | OUTPATIENT
Start: 2018-02-25 | End: 2018-03-07

## 2018-02-25 RX ADMIN — LIDOCAINE HYDROCHLORIDE 10 ML: 10 INJECTION, SOLUTION INFILTRATION; PERINEURAL at 10:02

## 2018-02-25 NOTE — ED NOTES
Wound dressed with non stick dressing and wrapped with kerlix detailed home care instructions given

## 2018-02-25 NOTE — ED NOTES
States that he injected heroin on Friday into left AC, are is now red swollen and very painful, swollen area size of a golfball. Sensation ROM and pulses intact. Aware to notify nurse of needs or concerns.

## 2018-02-25 NOTE — ED PROVIDER NOTES
"Encounter Date: 2/25/2018    SCRIBE #1 NOTE: IRyan, am scribing for, and in the presence of, Ana Maria Gunderson PA-C .       History     Chief Complaint   Patient presents with    Arm Injury     Lt antecubital with 3cm swelling surrounded by approx 10cm erythema - indicates site of IV drug use "got infected" (methamphetamines)       02/25/2018 10:19 AM     Chief complaint: Abscess      Elder Rosales is a 37 y.o. male with complaints of Anxiety and Carpal tunnel syndrome who presents to the ED with complaints of an erythematous abscess on the left arm after injecting heroin 2 days ago. Pt states he "missed the vein." He reports a previous occurrence of a similiar abscess secondary to IV drug use which resolved with Amoxicillin. Pt notes a subjective fever. He denied the needle breaking. NKDA noted.       The history is provided by the patient.     Review of patient's allergies indicates:  No Known Allergies  Past Medical History:   Diagnosis Date    Anxiety     Carpal tunnel syndrome     Shoulder disorder      History reviewed. No pertinent surgical history.  Family History   Problem Relation Age of Onset    No Known Problems Mother     No Known Problems Father      Social History   Substance Use Topics    Smoking status: Current Every Day Smoker    Smokeless tobacco: Never Used    Alcohol use Yes      Comment: 2-3x week     Review of Systems   Constitutional: Negative for activity change, appetite change, chills and fever.   HENT: Negative for congestion, rhinorrhea and sore throat.    Eyes: Negative for redness and visual disturbance.   Respiratory: Negative for cough, chest tightness and shortness of breath.    Cardiovascular: Negative for chest pain.   Gastrointestinal: Negative for abdominal pain, diarrhea, nausea and vomiting.   Genitourinary: Negative for dysuria and frequency.   Musculoskeletal: Negative for back pain, neck pain and neck stiffness.   Skin: Positive for color change. " "Negative for rash.        + for "abscess"   Neurological: Negative for dizziness, syncope, numbness and headaches.       Physical Exam     Vitals:    02/25/18 0938 02/25/18 1829   BP: (!) 149/81    BP Location: Right arm    Patient Position: Lying    Pulse: 109 95   Resp: 20    Temp: (!) 100.9 °F (38.3 °C) 99.8 °F (37.7 °C)   TempSrc: Oral    SpO2: 97%    Weight: 90.7 kg (200 lb)    Height: 6' (1.829 m)        Physical Exam    Constitutional: Vital signs are normal. He appears well-developed and well-nourished. He is cooperative.  Non-toxic appearance. He does not have a sickly appearance.   HENT:   Head: Normocephalic and atraumatic.   Right Ear: External ear normal.   Left Ear: External ear normal.   Nose: Nose normal.   Mouth/Throat: Oropharynx is clear and moist.   Eyes: Conjunctivae and lids are normal. Pupils are equal, round, and reactive to light.   Neck: Normal range of motion and full passive range of motion without pain. Neck supple.   Cardiovascular: Normal rate and regular rhythm.   Pulmonary/Chest: Breath sounds normal. He has no wheezes. He has no rales.   Abdominal: Soft. Normal appearance. There is no tenderness. There is no rigidity, no rebound and no guarding.   Neurological: He is alert and oriented to person, place, and time.   Skin: Skin is warm, dry and intact. No rash noted. There is erythema.   3x3 cm area of induration to left antecubital fossa. Surrounding erythema and warmth.          ED Course   I & D - Incision and Drainage  Date/Time: 2/25/2018 11:05 AM  Performed by: MEHRDAD DYER  Authorized by: AGUSTINA RODRIGUEZ   Type: abscess  Body area: upper extremity  Location details: left arm  Anesthesia: local infiltration    Anesthesia:  Local Anesthetic: lidocaine 1% without epinephrine  Anesthetic total: 7 mL  Risk factor: underlying major vessel  Scalpel size: 11  Incision type: single straight  Complexity: complex  Drainage: bloody  Drainage amount: scant  Wound treatment: " incision and  wound packed  Packing material: 1/2 in gauze  Patient tolerance: Patient tolerated the procedure well with no immediate complications        Labs Reviewed - No data to display          Medical Decision Making:   History:   Old Medical Records: I decided to obtain old medical records.       APC / Resident Notes:   This is an emergent evaluation of a 37 year old male with complaint of abscess to left AC with associated erythema. Patient is noted to have a 3 cm x 2 cm area of induration and fluctuance. US showed possible fluid collection. I&D performed, see procedure note. Patient tolerated well. Patient was given instructions on wound care. Antibiotics given. Follow up with primary care provider. Return precautions given. All questions answered. Case was discussed with Dr. Hess who has evaluated the patient and is in agreement with the plan of care.          Scribe Attestation:   Scribe #1: I performed the above scribed service and the documentation accurately describes the services I performed. I attest to the accuracy of the note.    I, Ana Maria Gunderson PA-C, personally performed the services described in this documentation. All medical record entries made by the scribe were at my direction and in my presence.  I have reviewed the chart and agree that the record reflects my personal performance and is accurate and complete. Ana Maria Gunderson PA-C.  6:29 PM 02/25/2018             Clinical Impression:     1. Abscess of left arm                               Ana Maria Gunderson PA-C  02/25/18 9389

## 2018-02-25 NOTE — DISCHARGE INSTRUCTIONS
Keep area clean and dry.  Remove packing in 2 days.  Take antibiotics as prescribed.  Return to the ER if symptoms worsen in the next 24 hours.  For worsening symptoms, chest pain, shortness of breath, increased abdominal pain, high grade fever, stroke or stroke like symptoms, immediately go to the nearest Emergency Room or call 911 as soon as possible.

## 2018-04-20 ENCOUNTER — HOSPITAL ENCOUNTER (EMERGENCY)
Facility: HOSPITAL | Age: 38
Discharge: LEFT BEFORE DISCHARGE | End: 2018-04-20
Attending: EMERGENCY MEDICINE
Payer: MEDICAID

## 2018-04-20 VITALS
BODY MASS INDEX: 24.07 KG/M2 | RESPIRATION RATE: 12 BRPM | HEIGHT: 72 IN | OXYGEN SATURATION: 98 % | TEMPERATURE: 100 F | WEIGHT: 177.69 LBS | HEART RATE: 105 BPM | SYSTOLIC BLOOD PRESSURE: 136 MMHG | DIASTOLIC BLOOD PRESSURE: 88 MMHG

## 2018-04-20 DIAGNOSIS — R79.89 ELEVATED LFTS: ICD-10-CM

## 2018-04-20 DIAGNOSIS — R63.4 WEIGHT LOSS: ICD-10-CM

## 2018-04-20 DIAGNOSIS — E87.6 HYPOKALEMIA: Primary | ICD-10-CM

## 2018-04-20 LAB
ALBUMIN SERPL BCP-MCNC: 4 G/DL
ALP SERPL-CCNC: 102 U/L
ALT SERPL W/O P-5'-P-CCNC: 109 U/L
AMPHET+METHAMPHET UR QL: NEGATIVE
ANION GAP SERPL CALC-SCNC: 13 MMOL/L
AST SERPL-CCNC: 117 U/L
BARBITURATES UR QL SCN>200 NG/ML: NEGATIVE
BASOPHILS # BLD AUTO: 0 K/UL
BASOPHILS NFR BLD: 0.4 %
BENZODIAZ UR QL SCN>200 NG/ML: NORMAL
BILIRUB SERPL-MCNC: 2.1 MG/DL
BUN SERPL-MCNC: 8 MG/DL
BZE UR QL SCN: NEGATIVE
CALCIUM SERPL-MCNC: 9.3 MG/DL
CANNABINOIDS UR QL SCN: NEGATIVE
CHLORIDE SERPL-SCNC: 104 MMOL/L
CO2 SERPL-SCNC: 27 MMOL/L
CREAT SERPL-MCNC: 0.8 MG/DL
CREAT UR-MCNC: 268.4 MG/DL
DIFFERENTIAL METHOD: ABNORMAL
EOSINOPHIL # BLD AUTO: 0.1 K/UL
EOSINOPHIL NFR BLD: 0.6 %
ERYTHROCYTE [DISTWIDTH] IN BLOOD BY AUTOMATED COUNT: 14.3 %
EST. GFR  (AFRICAN AMERICAN): >60 ML/MIN/1.73 M^2
EST. GFR  (NON AFRICAN AMERICAN): >60 ML/MIN/1.73 M^2
GLUCOSE SERPL-MCNC: 99 MG/DL
HCT VFR BLD AUTO: 47.2 %
HGB BLD-MCNC: 16.4 G/DL
INR PPP: 1.1
LYMPHOCYTES # BLD AUTO: 1.7 K/UL
LYMPHOCYTES NFR BLD: 17 %
MAGNESIUM SERPL-MCNC: 2.2 MG/DL
MCH RBC QN AUTO: 30.4 PG
MCHC RBC AUTO-ENTMCNC: 34.7 G/DL
MCV RBC AUTO: 88 FL
METHADONE UR QL SCN>300 NG/ML: NEGATIVE
MONOCYTES # BLD AUTO: 0.7 K/UL
MONOCYTES NFR BLD: 7.3 %
NEUTROPHILS # BLD AUTO: 7.6 K/UL
NEUTROPHILS NFR BLD: 74.7 %
OPIATES UR QL SCN: NEGATIVE
PCP UR QL SCN>25 NG/ML: NEGATIVE
PLATELET # BLD AUTO: 124 K/UL
PMV BLD AUTO: 9.9 FL
POTASSIUM SERPL-SCNC: 2.9 MMOL/L
PROT SERPL-MCNC: 7.4 G/DL
PROTHROMBIN TIME: 10.8 SEC
RBC # BLD AUTO: 5.38 M/UL
SODIUM SERPL-SCNC: 144 MMOL/L
TOXICOLOGY INFORMATION: NORMAL
WBC # BLD AUTO: 10.2 K/UL

## 2018-04-20 PROCEDURE — 99283 EMERGENCY DEPT VISIT LOW MDM: CPT

## 2018-04-20 PROCEDURE — 85610 PROTHROMBIN TIME: CPT

## 2018-04-20 PROCEDURE — 36415 COLL VENOUS BLD VENIPUNCTURE: CPT

## 2018-04-20 PROCEDURE — 83735 ASSAY OF MAGNESIUM: CPT

## 2018-04-20 PROCEDURE — 80053 COMPREHEN METABOLIC PANEL: CPT

## 2018-04-20 PROCEDURE — 80307 DRUG TEST PRSMV CHEM ANLYZR: CPT

## 2018-04-20 PROCEDURE — 85025 COMPLETE CBC W/AUTO DIFF WBC: CPT

## 2018-04-20 RX ORDER — POTASSIUM CHLORIDE 20 MEQ/1
60 TABLET, EXTENDED RELEASE ORAL
Status: DISCONTINUED | OUTPATIENT
Start: 2018-04-20 | End: 2018-04-20 | Stop reason: HOSPADM

## 2018-04-20 NOTE — ED PROVIDER NOTES
Encounter Date: 4/20/2018    SCRIBE #1 NOTE: Selena TRUJILLO, am scribing for, and in the presence of, Ana Maria Gunderson PA-C.       History     Chief Complaint   Patient presents with    decreased appetite     x 2 years.     4/20/2018  10:17 AM     Chief Complaint: Fatigue    The patient is a 37 y.o. male with PMHx of hepatits C, carpal tunnel syndrome, anxiety and shoulder disorder who presents with fatigue. Patient c/o gradual onset of constant fatigue which started 2 months ago. Pt states he does not feel good and he has a decreased appetite. He also reports nauesa and vomiting associated when he attempts to eat. Pt mentions he lost 60 pounds in 2 months. No recent fevers, chills, sob, cp, cough, abdominal pain or diarrhea. Pt does not follow up for his pmhx of hepatitis C. He reports he only follows up with his gastroenterologist who prescribes him suboxone but he does not take it anymore. Social hx of alcohol. Denies any drug usage. No shx.      The history is provided by the patient.     Review of patient's allergies indicates:  No Known Allergies  Past Medical History:   Diagnosis Date    Anxiety     Carpal tunnel syndrome     Shoulder disorder      History reviewed. No pertinent surgical history.  Family History   Problem Relation Age of Onset    No Known Problems Mother     No Known Problems Father      Social History   Substance Use Topics    Smoking status: Current Every Day Smoker    Smokeless tobacco: Never Used    Alcohol use Yes      Comment: [email or web address]     Review of Systems   Constitutional: Positive for appetite change (decreased), fatigue and unexpected weight change. Negative for chills and fever.   HENT: Negative for congestion, rhinorrhea and sore throat.    Respiratory: Negative for cough and shortness of breath.    Cardiovascular: Negative for chest pain.   Gastrointestinal: Positive for nausea and vomiting. Negative for abdominal pain and diarrhea.   Genitourinary: Negative for  dysuria.   Musculoskeletal: Negative for back pain and myalgias.   Skin: Negative for rash.   Neurological: Negative for weakness and numbness.   Hematological: Does not bruise/bleed easily.   All other systems reviewed and are negative.      Physical Exam     Initial Vitals [04/20/18 0947]   BP Pulse Resp Temp SpO2   136/88 105 12 99.6 °F (37.6 °C) 98 %      MAP       104         Physical Exam    Nursing note and vitals reviewed.  Constitutional: No distress.   HENT:   Head: Normocephalic and atraumatic.   Mouth/Throat: Mucous membranes are normal.   Eyes: EOM are normal. Pupils are equal, round, and reactive to light.   Neck: Normal range of motion.   Cardiovascular: Normal rate, regular rhythm, normal heart sounds and intact distal pulses. Exam reveals no gallop and no friction rub.    No murmur heard.  Pulmonary/Chest: Breath sounds normal. He has no wheezes. He has no rhonchi. He has no rales.   Abdominal: Soft. He exhibits no distension. There is no tenderness.   Musculoskeletal: Normal range of motion. He exhibits no edema.   Neurological: He is alert and oriented to person, place, and time. He has normal strength.   Skin: Skin is dry. No rash noted.   Psychiatric: He has a normal mood and affect.         ED Course   Procedures  Labs Reviewed   CBC W/ AUTO DIFFERENTIAL - Abnormal; Notable for the following:        Result Value    Platelets 124 (*)     Gran% 74.7 (*)     Lymph% 17.0 (*)     All other components within normal limits   COMPREHENSIVE METABOLIC PANEL - Abnormal; Notable for the following:     Potassium 2.9 (*)     Total Bilirubin 2.1 (*)      (*)      (*)     All other components within normal limits   PROTIME-INR   DRUG SCREEN PANEL, URINE EMERGENCY   MAGNESIUM             Medical Decision Making:   History:   I obtained history from: someone other than patient.  Old Medical Records: I decided to obtain old medical records.  Clinical Tests:   Lab Tests: Ordered and Reviewed        APC / Resident Notes:   Emergent evaluation of 37-year-old male who presents with decreased appetite for approximately 3 months.  He states he's been able to eat or drink anything but has a Menendez's cup at the his bed.  He reports he is concerned about his hepatitis C.  He is well-appearing.  Vital signs are stable.  Labs obtained.  He is noted to have a potassium of 2.9.  Magnesium is normal.  Chronically elevated LFTs and bilirubin.  Oral potassium was ordered, when the nurse went to give it to him he has eloped from the ER. Case was discussed with Dr. Navarro who is in agreement with the plan of care. All questions answered.          Scribe Attestation:   Scribe #1: I performed the above scribed service and the documentation accurately describes the services I performed. I attest to the accuracy of the note.    Attending Attestation:     Physician Attestation Statement for NP/PA:   I discussed this assessment and plan of this patient with the NP/PA, but I did not personally examine the patient. The face to face encounter was performed by the NP/PA.    Other NP/PA Attestation Additions:    History of Present Illness: 37-year-old male presents with a chief complaint of fatigue and a decreased appetite.    Medical Decision Making: Initial differential diagnosis included but not limited to dehydration, electrolyte abnormality, and illicit drug use.  The patient had lab work drawn in the emergency department.  Prior to finishing his workup and getting his lab results the patient eloped from the emergency department.  The patient had no signs of clinical intoxication before this.  I am in agreement with the physician assistant's  assessment, treatment, and plan of care.           I, Ana Maria Gunderson PA-C, personally performed the services described in this documentation. All medical record entries made by the scribe were at my direction and in my presence.  I have reviewed the chart and agree that the record  reflects my personal performance and is accurate and complete. Ana Maria Gunderson PA-C.  5:54 PM 04/20/2018               Clinical Impression:   The primary encounter diagnosis was Hypokalemia. Diagnoses of Weight loss and Elevated LFTs were also pertinent to this visit.    Disposition:   Disposition: Eloped  Condition: Stable                        Ana Maria Gunderson PA-C  04/20/18 8441       Mitchell Navarro MD  04/20/18 3113

## 2018-05-20 ENCOUNTER — HOSPITAL ENCOUNTER (EMERGENCY)
Facility: HOSPITAL | Age: 38
Discharge: HOME | End: 2018-05-20
Attending: EMERGENCY MEDICINE
Payer: MEDICAID

## 2018-05-20 VITALS
WEIGHT: 215 LBS | HEIGHT: 72 IN | SYSTOLIC BLOOD PRESSURE: 157 MMHG | DIASTOLIC BLOOD PRESSURE: 76 MMHG | OXYGEN SATURATION: 97 % | TEMPERATURE: 99 F | RESPIRATION RATE: 16 BRPM | HEART RATE: 77 BPM | BODY MASS INDEX: 29.12 KG/M2

## 2018-05-20 DIAGNOSIS — M79.5 FOREIGN BODY (FB) IN SOFT TISSUE: ICD-10-CM

## 2018-05-20 PROCEDURE — 99284 EMERGENCY DEPT VISIT MOD MDM: CPT | Mod: 25

## 2018-05-20 RX ORDER — CEPHALEXIN 500 MG/1
500 CAPSULE ORAL 4 TIMES DAILY
Qty: 20 CAPSULE | Refills: 0 | Status: SHIPPED | OUTPATIENT
Start: 2018-05-20 | End: 2018-05-27

## 2018-05-21 NOTE — ED PROVIDER NOTES
Encounter Date: 5/20/2018    SCRIBE #1 NOTE: I, Piyush Bella, am scribing for, and in the presence of, Corinne Cabello NP.       History     Chief Complaint   Patient presents with    Foreign Body     Possible glass in Rt foot       05/20/2018 9:39 PM     Chief complaint: Foreign body in right foot      Elder Rosales is a 37 y.o. male without any significant PMHx of  who presents to the ED accompanied by his wife and young daughter c/o a possible foreign body in his right foot. He states he stepped on a plate 3 days ago in his kitchen. His wife states she removed 2 pieces of the plate after the injury occurred. He states he took some left over Amoxicillin and he thought it helped but now he says his foot is swelling up and he thinks he still has a piece of the plate in his foot. He denies having a fever. He is UTD on his tetanus. Daily medications include Gabapentin and Klonopin. He has NKDA.      The history is provided by the patient.     Review of patient's allergies indicates:  No Known Allergies  Past Medical History:   Diagnosis Date    Anxiety     Carpal tunnel syndrome     Shoulder disorder      History reviewed. No pertinent surgical history.  Family History   Problem Relation Age of Onset    No Known Problems Mother     No Known Problems Father      Social History   Substance Use Topics    Smoking status: Current Every Day Smoker    Smokeless tobacco: Never Used    Alcohol use Yes      Comment: arnaud     Review of Systems   Constitutional: Negative for fever.   Respiratory: Negative for shortness of breath.    Cardiovascular: Negative for chest pain.   Musculoskeletal: Positive for arthralgias (right foot).   Skin: Positive for wound (right foot). Negative for rash.        Possible foreign body retained in left foot       Physical Exam     Initial Vitals [05/20/18 2130]   BP Pulse Resp Temp SpO2   (!) 157/76 77 16 98.6 °F (37 °C) 97 %      MAP       103         Physical Exam    Nursing note  and vitals reviewed.  Constitutional: Vital signs are normal. He appears well-developed and well-nourished.   HENT:   Head: Normocephalic and atraumatic.   Eyes: Pupils are equal, round, and reactive to light.   Neck: Neck supple.   Cardiovascular: Normal rate, regular rhythm, normal heart sounds and intact distal pulses. Exam reveals no gallop and no friction rub.    No murmur heard.  Pulmonary/Chest: Breath sounds normal. He has no wheezes. He has no rhonchi. He has no rales.   Abdominal: Soft. Normal appearance and bowel sounds are normal.   Musculoskeletal: He exhibits tenderness. He exhibits no edema.        Right ankle: Normal.        Right foot: There is tenderness. There is normal range of motion, no bony tenderness, no swelling, normal capillary refill, no crepitus, no deformity and no laceration.        Feet:    2 vertical well approximated superficial lacerations; no palpable foreign body; no drainage; no erythema. Appears to be healing well.    Neurological: He is alert and oriented to person, place, and time. He has normal strength.   Skin: Skin is warm, dry and intact.   Psychiatric: He has a normal mood and affect. His speech is normal and behavior is normal.         ED Course   Procedures  Labs Reviewed - No data to display          Medical Decision Making:   History:   Old Medical Records: I decided to obtain old medical records.  Differential Diagnosis:   Retained foreign body, fracture, cellulitis  Clinical Tests:   Radiological Study: Reviewed and Ordered       APC / Resident Notes:   Patient is a 37 y.o. male who presents to the ED 05/21/2018 who underwent emergent evaluation for possible foreign body.  Patient states he stepped on glass 3 days ago and has continued foreign body sensation.  Patient is not septic or toxic appearing. Right foot is without swelling, deformity, bony tenderness. No signs of neurovascular compromise.  Plus two DP pulse.  2 vertical well approximated superficial  lacerations to plantar surface of foot; no palpable foreign body; no drainage; no erythema. Appears to be healing well. X-ray is without acute fracture, dislocation, or foreign body.  Bedside ultrasound her doctor review per without signs of retained foreign body.  Patient's tetanus is up-to-date.  I have low suspicion for retained foreign body or infection however will place patient on prophylactic antibiotics Keflex.  Will have patient follow up with Podiatry.  Patient instructed to take anti-inflammatories as needed for discomfort. Based on my clinical evaluation, I do not appreciate any immediate, emergent, or life threatening condition or etiology that warrants additional workup today and feel that the patient can be discharged with close follow up care. Case discussed with Dr. Colon who is agreeable to plan of care. Follow up and return precautions discussed; patient verbalized understanding and is agreeable to plan of care. Patient discharged home in stable condition.             Scribe Attestation:   Scribe #1: I performed the above scribed service and the documentation accurately describes the services I performed. I attest to the accuracy of the note.    Attending Attestation:     Physician Attestation Statement for NP/PA:   I have conducted a face to face encounter with this patient in addition to the NP/PA, due to NP/PA Request    Other NP/PA Attestation Additions:      Medical Decision Making: Elder Rosales is a 37 y.o. male presenting with foreign body sensation in foot.  Patient stepped on and removed several pieces of glass from the foot 3 days ago.  He has been self treating with amoxicillin with subsequent decrease and perceived redness and swelling.  There is minimal reactive pain, redness, and swelling around to plantar 1.5 cm puncture wounds.  There is no purulent drainage.  There are no foreign bodies visible or palpable.  2+ DP and PT pulses with 5/5 strength sensation bilaterally.  No other foot tenderness to palpation.  Bedside ultrasound reveals no sign of foreign body, nor does x-ray.  Patient does understand that neither study completely exclude foreign body but I do not think further open exploration in the emergency department is indicated.  He may follow up with Podiatry.  Keflex initiated to cover for possibility of infection.  I do not think she requires admission for IV antibiotics.  He was advised to follow up closely with Podiatry this week.  Return precautions reviewed.       Physician Attestation for Scribe:  Physician Attestation Statement for Scribe #1: I, Corinne Cabello, reviewed documentation, as scribed by in my presence, and it is both accurate and complete.     Comments: I, Corinne Cabello NP-C, personally performed the services described in this documentation. All medical record entries made by the scribe were at my direction and in my presence.  I have reviewed the chart and agree that the record reflects my personal performance and is accurate and complete. MARYA Guerrier.  9:12 AM 05/21/2018 e           ED Course as of May 20 2306   Sun May 20, 2018   2203 XR R foot:  No FB evident. (my read)    The patient is aware that the diagnostic studies will be reviewed by radiology and there is a chance of revision of my initial reading, potentially including additional findings.  We will attempt to contact them for clinically significant findings if a change in treatment regimen or follow up is warranted.  [MR]      ED Course User Index  [MR] Dhiraj Colon MD     Clinical Impression:   The encounter diagnosis was Foreign body (FB) in soft tissue.    Disposition:   Disposition: Discharged  Condition: Stable                        Corinne Cabello NP  05/21/18 0912

## 2018-05-25 ENCOUNTER — HOSPITAL ENCOUNTER (EMERGENCY)
Facility: HOSPITAL | Age: 38
Discharge: HOME | End: 2018-05-25
Attending: EMERGENCY MEDICINE
Payer: MEDICAID

## 2018-05-25 VITALS
BODY MASS INDEX: 29.12 KG/M2 | RESPIRATION RATE: 20 BRPM | OXYGEN SATURATION: 97 % | DIASTOLIC BLOOD PRESSURE: 97 MMHG | HEART RATE: 90 BPM | HEIGHT: 72 IN | WEIGHT: 215 LBS | SYSTOLIC BLOOD PRESSURE: 137 MMHG | TEMPERATURE: 98 F

## 2018-05-25 DIAGNOSIS — E88.09 HYPOALBUMINEMIA: Primary | ICD-10-CM

## 2018-05-25 DIAGNOSIS — R60.0 PERIPHERAL EDEMA: ICD-10-CM

## 2018-05-25 DIAGNOSIS — F10.929 ALCOHOLIC INTOXICATION WITH COMPLICATION: ICD-10-CM

## 2018-05-25 LAB
ALBUMIN SERPL BCP-MCNC: 3.3 G/DL
ALP SERPL-CCNC: 97 U/L
ALT SERPL W/O P-5'-P-CCNC: 82 U/L
AMPHET+METHAMPHET UR QL: NEGATIVE
ANION GAP SERPL CALC-SCNC: 9 MMOL/L
APAP SERPL-MCNC: <3 UG/ML
AST SERPL-CCNC: 68 U/L
BARBITURATES UR QL SCN>200 NG/ML: NEGATIVE
BASOPHILS # BLD AUTO: 0 K/UL
BASOPHILS NFR BLD: 0.5 %
BENZODIAZ UR QL SCN>200 NG/ML: NORMAL
BILIRUB SERPL-MCNC: 0.5 MG/DL
BILIRUB UR QL STRIP: NEGATIVE
BUN SERPL-MCNC: 5 MG/DL
BZE UR QL SCN: NEGATIVE
CALCIUM SERPL-MCNC: 8.7 MG/DL
CANNABINOIDS UR QL SCN: NEGATIVE
CHLORIDE SERPL-SCNC: 105 MMOL/L
CLARITY UR: CLEAR
CO2 SERPL-SCNC: 27 MMOL/L
COLOR UR: YELLOW
CREAT SERPL-MCNC: 0.8 MG/DL
CREAT UR-MCNC: 69.1 MG/DL
CRP SERPL-MCNC: 17.8 MG/L
DIFFERENTIAL METHOD: ABNORMAL
EOSINOPHIL # BLD AUTO: 0.5 K/UL
EOSINOPHIL NFR BLD: 7.5 %
ERYTHROCYTE [DISTWIDTH] IN BLOOD BY AUTOMATED COUNT: 14.3 %
EST. GFR  (AFRICAN AMERICAN): >60 ML/MIN/1.73 M^2
EST. GFR  (NON AFRICAN AMERICAN): >60 ML/MIN/1.73 M^2
ETHANOL SERPL-MCNC: 200 MG/DL
GLUCOSE SERPL-MCNC: 131 MG/DL
GLUCOSE UR QL STRIP: NEGATIVE
HCT VFR BLD AUTO: 38 %
HGB BLD-MCNC: 12.8 G/DL
HGB UR QL STRIP: NEGATIVE
KETONES UR QL STRIP: NEGATIVE
LEUKOCYTE ESTERASE UR QL STRIP: NEGATIVE
LYMPHOCYTES # BLD AUTO: 2.8 K/UL
LYMPHOCYTES NFR BLD: 40.7 %
MCH RBC QN AUTO: 30.4 PG
MCHC RBC AUTO-ENTMCNC: 33.8 G/DL
MCV RBC AUTO: 90 FL
METHADONE UR QL SCN>300 NG/ML: NEGATIVE
MONOCYTES # BLD AUTO: 0.7 K/UL
MONOCYTES NFR BLD: 9.6 %
NEUTROPHILS # BLD AUTO: 2.9 K/UL
NEUTROPHILS NFR BLD: 41.7 %
NITRITE UR QL STRIP: NEGATIVE
OPIATES UR QL SCN: NEGATIVE
PCP UR QL SCN>25 NG/ML: NEGATIVE
PH UR STRIP: 6 [PH] (ref 5–8)
PLATELET # BLD AUTO: 211 K/UL
PMV BLD AUTO: 8.4 FL
POTASSIUM SERPL-SCNC: 3.4 MMOL/L
PROT SERPL-MCNC: 5.9 G/DL
PROT UR QL STRIP: NEGATIVE
RBC # BLD AUTO: 4.22 M/UL
SODIUM SERPL-SCNC: 141 MMOL/L
SP GR UR STRIP: 1.01 (ref 1–1.03)
TOXICOLOGY INFORMATION: NORMAL
URN SPEC COLLECT METH UR: NORMAL
UROBILINOGEN UR STRIP-ACNC: NEGATIVE EU/DL
WBC # BLD AUTO: 7 K/UL

## 2018-05-25 PROCEDURE — 86140 C-REACTIVE PROTEIN: CPT

## 2018-05-25 PROCEDURE — 99283 EMERGENCY DEPT VISIT LOW MDM: CPT

## 2018-05-25 PROCEDURE — 81003 URINALYSIS AUTO W/O SCOPE: CPT | Mod: 59

## 2018-05-25 PROCEDURE — 85025 COMPLETE CBC W/AUTO DIFF WBC: CPT

## 2018-05-25 PROCEDURE — 80329 ANALGESICS NON-OPIOID 1 OR 2: CPT

## 2018-05-25 PROCEDURE — 80320 DRUG SCREEN QUANTALCOHOLS: CPT

## 2018-05-25 PROCEDURE — 80307 DRUG TEST PRSMV CHEM ANLYZR: CPT

## 2018-05-25 PROCEDURE — 25000003 PHARM REV CODE 250: Performed by: EMERGENCY MEDICINE

## 2018-05-25 PROCEDURE — 80053 COMPREHEN METABOLIC PANEL: CPT

## 2018-05-25 PROCEDURE — 36415 COLL VENOUS BLD VENIPUNCTURE: CPT

## 2018-05-25 RX ORDER — FUROSEMIDE 40 MG/1
40 TABLET ORAL
Status: COMPLETED | OUTPATIENT
Start: 2018-05-25 | End: 2018-05-25

## 2018-05-25 RX ADMIN — FUROSEMIDE 40 MG: 40 TABLET ORAL at 12:05

## 2018-05-25 NOTE — ED PROVIDER NOTES
"Encounter Date: 5/25/2018    SCRIBE #1 NOTE: I, Evelia Roy, am scribing for, and in the presence of, Dr. Joshi.       History     Chief Complaint   Patient presents with    Leg Swelling     pt reports he has bilateral lower leg swelling for the past 2 days, reports hx of foreign body in rt leg       05/25/2018 11:09 AM     Chief complaint: Leg Swelling      Elder Rosales is a 37 y.o. male who presents to the ED with complaints of bilateral foot swelling that started x2 days ago. He states swelling started on the right foot and has radiated to the left as well. The patient was seen here on 5/20 secondary to a possible foreign body in his right foot due to stepping on glass x3 days prior. The patient received an X-ray and a bedside ultrasound that were both negative for any foreign bodies. He was discharged on prophylactic antibiotics, Keflex, with no improvements to swelling. The patient denies fever, night sweats, chills, and SOB. The patient admits to alcohol consumption a "couple times a week". He admits to having alcohol consumption last night, but denies drinking today. He also reports having a history of Hep C but is not currently on medication. He denies IV drug use. PMHx includes carpal tunnel syndrome and anxiety. No pertinent SHx. NDKA noted.       The history is provided by the patient.     Review of patient's allergies indicates:  No Known Allergies  Past Medical History:   Diagnosis Date    Anxiety     Carpal tunnel syndrome     Shoulder disorder      No past surgical history on file.  Family History   Problem Relation Age of Onset    No Known Problems Mother     No Known Problems Father      Social History   Substance Use Topics    Smoking status: Current Every Day Smoker    Smokeless tobacco: Never Used    Alcohol use Yes      Comment: arnaud     Review of Systems   Constitutional: Negative for activity change, appetite change, chills, fatigue and fever.   HENT: Negative for " congestion, drooling, hearing loss, nosebleeds, rhinorrhea, sinus pain and sore throat.    Respiratory: Negative for apnea, cough and shortness of breath.    Cardiovascular: Positive for leg swelling (bilateral). Negative for chest pain and palpitations.   Gastrointestinal: Negative for abdominal distention, abdominal pain, constipation, nausea and vomiting.   Musculoskeletal: Negative for back pain, gait problem, joint swelling and neck pain.   Skin: Negative for pallor and rash.   Neurological: Negative for dizziness, tremors, seizures, weakness, numbness and headaches.   Hematological: Does not bruise/bleed easily.   Psychiatric/Behavioral: Negative for agitation.       Physical Exam     Initial Vitals [05/25/18 1104]   BP Pulse Resp Temp SpO2   (!) 137/97 90 20 98 °F (36.7 °C) 97 %      MAP       110.33         Physical Exam    Nursing note and vitals reviewed.  Constitutional: He appears well-developed and well-nourished.   Smells of alcohol.   HENT:   Head: Normocephalic and atraumatic.   Eyes: Conjunctivae are normal. Right eye exhibits nystagmus. Left eye exhibits nystagmus.   Horizontal nystagmus.   Neck: Normal range of motion. Neck supple.   Cardiovascular: Normal rate, regular rhythm and normal heart sounds. Exam reveals no gallop and no friction rub.    No murmur heard.  Pulmonary/Chest: Breath sounds normal. No respiratory distress. He has no wheezes. He has no rhonchi. He has no rales.   Abdominal: Soft. He exhibits no distension. There is hepatomegaly. There is no tenderness.   Musculoskeletal: Normal range of motion. He exhibits edema (2+ pitting edema bilaterally).   Marked tissue swelling of bilateral ankles.   Neurological: He is alert and oriented to person, place, and time.   Skin: Skin is warm and dry.   Psychiatric: He has a normal mood and affect.         ED Course   Procedures  Labs Reviewed   COMPREHENSIVE METABOLIC PANEL - Abnormal; Notable for the following:        Result Value     Potassium 3.4 (*)     Glucose 131 (*)     BUN, Bld 5 (*)     Total Protein 5.9 (*)     Albumin 3.3 (*)     AST 68 (*)     ALT 82 (*)     All other components within normal limits   CBC W/ AUTO DIFFERENTIAL - Abnormal; Notable for the following:     RBC 4.22 (*)     Hemoglobin 12.8 (*)     Hematocrit 38.0 (*)     MPV 8.4 (*)     All other components within normal limits   ALCOHOL,MEDICAL (ETHANOL) - Abnormal; Notable for the following:     Alcohol, Medical, Serum 200 (*)     All other components within normal limits   ACETAMINOPHEN LEVEL - Abnormal; Notable for the following:     Acetaminophen (Tylenol), Serum <3.0 (*)     All other components within normal limits   C-REACTIVE PROTEIN - Abnormal; Notable for the following:     CRP 17.8 (*)     All other components within normal limits   URINALYSIS   DRUG SCREEN PANEL, URINE EMERGENCY             Medical Decision Making:   Clinical Tests:   Medical Tests: Ordered and Reviewed  ED Management:  37-year-old male presents with complaints of lower extremity swelling.  There is bilateral lower extremity swelling of the legs most pronounced in the ankles.  The diffuse nature of the swelling makes septic arthritis unlikely.  He adamantly denies recent alcohol consumption despite multiple eye witness who watched him consume 2 pt of vodka in the parking lot.  Blood alcohol is 200.  This behavior suggest under reported excessive daily alcohol consumption.  He has hypoalbuminemia and known hepatitis B suggestive of cirrhosis.  He is encouraged to discontinue all alcohol and return for worsening symptoms. He is referred to primary care for further evaluation of his liver function.       APC / Resident Notes:   I, Dr. Malcolm Joshi III, personally performed the services described in this documentation. All medical record entries made by the scribe were at my direction and in my presence.  I have reviewed the chart and agree that the record reflects my personal performance and is  accurate and complete. Malcolm Joshi III, MD.  3:03 PM 05/25/2018       Scribe Attestation:   Scribe #1: I performed the above scribed service and the documentation accurately describes the services I performed. I attest to the accuracy of the note.               Clinical Impression:   The primary encounter diagnosis was Hypoalbuminemia. Diagnoses of Peripheral edema and Alcoholic intoxication with complication were also pertinent to this visit.    Disposition:   Disposition: Discharged  Condition: Stable                        Malcolm Joshi III, MD  05/25/18 3765

## 2018-05-28 NOTE — PROVIDER PROGRESS NOTES - EMERGENCY DEPT.
Encounter Date: 5/20/2018    ED Physician Progress Notes        Physician Note:   Multiple phone attempts re: radiology overread confirming suspected foreign body on XR.  Certified letter sent by staff to listed address.

## 2020-09-03 ENCOUNTER — TELEPHONE (OUTPATIENT)
Dept: PHARMACY | Facility: CLINIC | Age: 40
End: 2020-09-03

## 2020-09-03 PROBLEM — F52.0 HYPOACTIVE SEXUAL DESIRE: Status: ACTIVE | Noted: 2020-09-03

## 2020-09-03 PROBLEM — J32.9 CHRONIC SINUSITIS: Status: ACTIVE | Noted: 2020-09-03

## 2020-09-03 PROBLEM — Z72.0 NICOTINE ABUSE: Status: ACTIVE | Noted: 2020-09-03

## 2020-09-03 PROBLEM — F19.10 POLYSUBSTANCE ABUSE: Status: RESOLVED | Noted: 2017-10-04 | Resolved: 2020-09-03

## 2020-09-03 PROBLEM — D75.1 POLYCYTHEMIA: Status: ACTIVE | Noted: 2020-09-03

## 2020-09-03 PROBLEM — F90.2 ATTENTION DEFICIT HYPERACTIVITY DISORDER (ADHD), COMBINED TYPE: Status: ACTIVE | Noted: 2020-09-03

## 2020-09-03 PROBLEM — Z79.891 CHRONICALLY ON OPIATE THERAPY: Status: ACTIVE | Noted: 2020-09-03

## 2020-09-03 RX ORDER — ATOMOXETINE 40 MG/1
40 CAPSULE ORAL EVERY MORNING
COMMUNITY
Start: 2020-08-11 | End: 2022-04-15

## 2020-09-03 RX ORDER — TRAZODONE HYDROCHLORIDE 100 MG/1
TABLET ORAL
Status: ON HOLD | COMMUNITY
Start: 2020-06-17 | End: 2023-02-28 | Stop reason: HOSPADM

## 2020-09-03 RX ORDER — MULTIVITAMIN
1 TABLET ORAL DAILY
COMMUNITY
End: 2022-04-15

## 2020-09-03 RX ORDER — MULTIVITAMIN
1 TABLET ORAL DAILY
COMMUNITY
End: 2020-09-03

## 2020-09-03 RX ORDER — ZOLPIDEM TARTRATE 10 MG/1
TABLET ORAL
COMMUNITY
End: 2022-04-15

## 2020-09-03 NOTE — TELEPHONE ENCOUNTER
DOCUMENTATION ONLY:  BRUNILDA epclusa is covered under the patients medicaid plan without authorization for $0

## 2020-09-03 NOTE — TELEPHONE ENCOUNTER
Initial Epclusa consult completed on 9/3. Epclusa will be shipped on 9/3 to arrive at patient's home on  via [employer]. $0 copay. Patient will start Epclusa on . Address confirmed. Confirmed 2 patient identifiers - name and . Therapy Appropriate.     Epclusa 400/100mg- Take one tablet by mouth daily x 12 weeks  Counseling was reviewed:   1. Patient MUST take Epclusa at the SAME time every day at 8 am.   2. Patient MUST avoid acid reducers without consulting with myself or provider first. Antacids are to be spaced out at least 4 hours apart from Epclusa.  Pepcid is to be taken AT THE SAME TIME as Epclusa. Patient reports he does experience acid reflux occasionally. Thoroughly counseling on timing of antacids and H2RAs, patient repeated back understanding.Patient is to AVOID PPIs (he has taken Prilosec in that past) while on Epclusa.   3. Potential Side effects include: headaches and fatigue.Headache: Patient may treat with OTC remedies. If Tylenol is used, dose should not exceed 2000mg per day. 4. Allergies reviewed and medication reconciliation complete (reviewed and documented in [facility] and [facility]). Patient MUST contact myself or provider prior to starting any new OTC, herbal, or prescription drugs to avoid potential DDIs.   5. Patient is to AVOID natural products --- patient was taking milk thistle-- while on Epclusa.     DDI: Medication list reviewed--no DDIs identified    Currently, at baseline patient feels tired, no energy, no appetite. Discussed the importance of staying well hydrated while on therapy and patient will report questions or concerns to myself or practitioner  Compliance stressed - patient to take missed doses as soon as remembered, but NOT to take 2 doses in one day. Patient verbalizes understanding. Patient plans to start Epclusa on .  Consultation included: indication; goals of treatment; administration; storage and handling; side effects; how to handle side  effects; the importance of compliance; how to handle missed doses; the importance of laboratory monitoring; the importance of keeping all follow up appointments.  Disease education reviewed (including transmission and prevention). Patient understands to report any medication changes to [facility] and provider. All questions answered and addressed to patients satisfaction. I will f/u with patient 1 week from start, [facility] to contact patient in 3 weeks for refills.     Thank you,   Codie Ramesh, PharmD  Clinical Pharmacist - [facility]  [phone]

## 2020-09-15 NOTE — TELEPHONE ENCOUNTER
7 day touch base:   Attempted to follow-up with patient and confirm Epclusa start date. No answer, could not leave voicemail for call back. Will follow-up with refill if no return call.

## 2020-09-28 ENCOUNTER — TELEPHONE (OUTPATIENT)
Dept: PHARMACY | Facility: CLINIC | Age: 40
End: 2020-09-28

## 2020-09-28 NOTE — TELEPHONE ENCOUNTER
AG Epclusa refill (2 of 3) confirmed and reassessment complete. We will ship AG Epclusa refill on  via [employer] to arrive on . $0.00 copay- 004. Confirmed 2 patient identifiers - name and . Therapy appropriate.     Patient has 8 doses of AG Epclusa remaining and takes it around 8am daily.  Pt reports they are experiencing some fatigue not worsened after start of Epclusa - believes it to be related to HCV infection. Patient also reports decreased appetite, but not severe enough to greatly affect his weight or energy levels. No missed doses, no new medications, no new allergies or health conditions reported at this time.    - Patient reports recently completing treatment for pneumonia - still experiencing some cough and SOB, but uses his albuterol inhaler as prescribed.    Allergies reviewed and medication reconciliation complete (reviewed and documented in [facility] and [facility]).    - Patient reports he has needed Rolaids once or twice, but takes in the evening, more than 4 hours after Epclusa dose. Denies any other medications for acid reflux or heartburn.    Disease education reviewed (including transmission and prevention). Patient counseled on importance of maintaining adherence and keeping lab appointments which were scheduled.    - Patient reports he works off [employer] for 3-5 weeks at a time, and is going off shore on . Patient aware his work schedule conflicts with currently scheduled appointments, and he plans to reach out to providers' offices to reschedule appointments or determine if these appointments may be completed virtually. Patient confirms he will have internet and cell service while on the boat.    All questions answered and addressed to patients satisfaction. Advised to call [facility] and provider if any issues arise.  Pt verbalized understanding.

## 2020-10-28 ENCOUNTER — SPECIALTY PHARMACY (OUTPATIENT)
Dept: PHARMACY | Facility: CLINIC | Age: 40
End: 2020-10-28

## 2020-10-28 DIAGNOSIS — B18.2 CHRONIC HEPATITIS C WITHOUT HEPATIC COMA: Primary | ICD-10-CM

## 2020-10-28 NOTE — TELEPHONE ENCOUNTER
Specialty Pharmacy - Refill Coordination    Specialty Medication Orders Linked to Encounter      Most Recent Value   Medication #1  sofosbuvir-velpatasvir (EPCLUSA) 400-100 mg Tab (Order#[id number], Rx#[id number])        Subjective    Elder Rosales is a 40 y.o. male, who is followed by the specialty pharmacy service for management and education.    Encounters since last clinical assessment   No encounters found.   Clinical call attempts since last clinical assessment   No call attempts found.       Current Outpatient Medications   Medication Sig    albuterol (VENTOLIN HFA) 90 mcg/actuation inhaler Inhale 2 puffs into the lungs every 6 (six) hours as needed for Wheezing. Rescue    atomoxetine (STRATTERA) 40 MG capsule Take 40 mg by mouth every morning.    azithromycin (ZITHROMAX) 500 MG tablet Take 1 tablet (500 mg total) by mouth once daily.    buprenorphine-naloxone (SUBOXONE) 8-2 mg Film Place under the tongue.    calcium carbonate-magnesium hydroxide (ROLAIDS) 550-110 mg Chew Take 1 tablet by mouth daily as needed.    clonazePAM (KLONOPIN) 2 MG Tab Take 2 mg by mouth 2 (two) times daily.    fluticasone propionate (FLONASE) 50 mcg/actuation nasal spray 1 spray (50 mcg total) by Each Nostril route once daily.    gabapentin (NEURONTIN) 600 MG tablet Take 1 tablet (600 mg total) by mouth once daily.    multivitamin (THERAGRAN) per tablet Take 1 tablet by mouth once daily.    nicotine (NICODERM CQ) 7 mg/24 hr Place 1 patch onto the skin once daily.    sofosbuvir-velpatasvir (EPCLUSA) 400-100 mg Tab Take 1 tablet by mouth once daily.    traZODone (DESYREL) 100 MG tablet TAKE 1/2 TO 1 TABLET BY MOUTH EVERY EVENING    zolpidem (AMBIEN) 10 mg Tab zolpidem 10 mg tablet   Last reviewed on 9/28/2020  9:42 AM by Danii Prado, PharmD    Review of patient's allergies indicates:  No Known AllergiesLast reviewed on  9/3/2020 9:46 AM by Codie Ramesh    Drug Interactions    Drug interactions evaluated:  yes  Clinically relevant drug interactions identified: yes   Interactions list: Rolaids  And Epclusa - space by 4 hours apart   Drug management plan: PRN usage, space Rolaids 4 hours apart   Provided the patient with educational material regarding drug interactions: yes   Educational material comments: Patient confirmed understanding if taking Epclusa at 8am he must take Rolaids PRN for heartburn at noon or later.        Medication Adherence    What concerns does the patient have in regards to their medications: none  Patient reported X missed doses in the last month: 0  Any gaps in refill history greater than 2 weeks in the last 3 months: no  Demonstrates understanding of importance of adherence: yes  Informant: patient  Reliability of informant: reliable  Provider-estimated medication adherence level: %  Reasons for non-adherence: no problems identified  Adherence tools used: alarm   Other support network: none   Confirmed plan for next specialty medication refill: delivery by pharmacy  Refills needed for supportive medications: not needed       Adverse Effects    *All other systems reviewed and are negative       Assessment Questions - Documented Responses      Most Recent Value   Medication #1 Assessment Info   Patient status  Existing medication, Exisiting to [facility]   Is this medication appropriate for the patient?  Yes   Is this medication effective?  Yes        Refill Questions - Documented Responses      Most Recent Value   Relationship to patient of person spoken to?  Self   HIPAA/medical authority confirmed?  Yes   Any changes in contact preferences or allowed representatives?  No   Has the patient had any insurance changes?  No   Has the patient had any changes to specialty medication, dose, or instructions?  No   Has the patient been diagnosed with any new medical conditions?  No   Does the patient have any new allergies to medications or foods?  No   Does the patient have any concerns about side  effects?  No   Can the patient store medication/sharps container properly (at the correct temperature, away from children/pets, etc.)?  Yes   Can the patient call emergency services (911) in the event of an emergency?  Yes   Does the patient have any concerns or questions about taking or administering this medication as prescribed?  No   How many doses did the patient miss in the past 4 weeks or since the last fill?  0   How many doses does the patient have on hand?  8   How many days does the patient report on hand quantity will last?  8   Does the number of doses/days supply remaining match pharmacy expected amounts?  Yes   How will the patient receive the medication?  Mail   When does the patient need to receive the medication?  10/30/20   Shipping Address  Home   Address in [facility] confirmed and updated if neccessary?  Yes   Expected Copay ($)  0   Is the patient able to afford the medication copay?  Yes   Payment Method  zero copay   Days supply of Refill  28   Would patient like to speak to a pharmacist?  No [PharmD conducted assessment]   Do you want to trigger an intervention?  No   Do you want to trigger an additional referral task?  No   Refill activity completed?  Yes   Refill activity plan  Refill scheduled   Shipment/Pickup Date:  10/29/20          Objective    He has a past medical history of Anxiety, Carpal tunnel syndrome, and Shoulder disorder.    Tried/failed medications: none    BP Readings from Last 4 Encounters:   09/03/20 116/80   09/02/20 108/64   08/20/20 125/82   05/25/18 (!) 137/97     Ht Readings from Last 4 Encounters:   09/03/20 6' (1.829 m)   09/02/20 6' (1.829 m)   08/20/20 6' (1.829 m)   05/25/18 6' (1.829 m)     Wt Readings from Last 4 Encounters:   09/02/20 96 kg (211 lb 9.6 oz)   08/20/20 99.3 kg (219 lb)   05/25/18 97.5 kg (215 lb)   05/20/18 97.5 kg (215 lb)     No results found for: HCVGENOTYPE  Recent Labs   Lab Result Units 08/20/20  1451   Creatinine mg/dL 0.95    ALT U/L 180 H   AST U/L 140 H   Hepatitis B Surface Ag  Negative   Hep B S Ab  Positive A   Hep B Core Total Ab  Negative     The goals of Hepatitis C Virus (HCV) infection treatment include:  · Reducing all-cause mortality and liver-related health adverse consequences, including cirrhosis, end-stage liver disease, and hepatocellular carcinoma  · Achieving virologic cure as evidenced by a sustained virologic response  · Improving or maintaining quality of life  · Maintaining optimal therapy adherence  · Minimizing and managing side effects  · Preventing the transmission of HCV           Assessment/Plan  Patient plans to continue therapy without changes      Indication, dosage, appropriateness, effectiveness, safety and convenience of his specialty medication(s) were reviewed today.     Patient Counseling    Counseled the patient on the following: doses and administration discussed, safe handling, storage, and disposal discussed, possible adverse effects and management discussed, possible drug and prescription drug interactions discussed, possible drug and OTC drug and food interactions discussed, lab monitoring and follow-up discussed, therapeutic rationale discussed, cost of medications and cost implications discussed, adherence and missed doses discussed, pharmacy contact information discussed       Epclusa refill (3 of 3) confirmed and reassessment complete. We will ship Epclusa refill on 10/29 via [employer] to arrive on 10/30. $0 copay- 004. Spoke to patient -- Confirmed 2 patient identifiers - name and . Therapy appropriate.     Patient has 8 doses of Epclusa remaining and takes it around 8am daily. Uses a phone alarm to remember. Pt reports occasional headaches and fatigue. He has not been self-treating. Advised low dose tylenol no more than 2000 mg/day. No missed doses, no new medications, no new allergies or health conditions reported at this time. Allergies reviewed and medication reconciliation  complete--no changes [facility]  Disease education reviewed (including transmission and prevention). Patient counseled on importance of maintaining adherence and keeping lab appointments which were scheduled--advised SVR-12 will occur at the end of Feb 2020. Educated on cure rate of Epclusa but advised patient the therapy does not prevent reinfection. All questions answered and addressed to patients satisfaction. Patient stated he is moving back to [place] soon and will NOT be able to make appointment with NASEEM Menendez on 11/6. Advised important to reschedule and ensure he is following up with hepatology. Advised to call [facility] and provider if any issues arise.  Pt verbalized understanding.        Tasks added this encounter   No tasks added.   Tasks due within next 3 months   No tasks due.     Codie Ramesh, PharmD  [facility]  [address] [place] [id number]  Phone: [phone]  Fax: [phone]

## 2020-11-18 ENCOUNTER — OFFICE VISIT (OUTPATIENT)
Dept: URGENT CARE | Facility: CLINIC | Age: 40
End: 2020-11-18
Payer: MEDICAID

## 2020-11-18 VITALS
HEART RATE: 91 BPM | SYSTOLIC BLOOD PRESSURE: 159 MMHG | RESPIRATION RATE: 16 BRPM | OXYGEN SATURATION: 96 % | DIASTOLIC BLOOD PRESSURE: 94 MMHG | WEIGHT: 198 LBS | BODY MASS INDEX: 26.85 KG/M2

## 2020-11-18 DIAGNOSIS — R05.9 COUGH: ICD-10-CM

## 2020-11-18 DIAGNOSIS — R06.2 WHEEZING: Primary | ICD-10-CM

## 2020-11-18 DIAGNOSIS — J40 BRONCHITIS: ICD-10-CM

## 2020-11-18 LAB
CTP QC/QA: YES
CTP QC/QA: YES
FLUAV AG NPH QL: NEGATIVE
FLUBV AG NPH QL: NEGATIVE
SARS-COV-2 RDRP RESP QL NAA+PROBE: NEGATIVE

## 2020-11-18 PROCEDURE — 71046 X-RAY EXAM CHEST 2 VIEWS: CPT | Mod: S$GLB,,, | Performed by: NURSE PRACTITIONER

## 2020-11-18 PROCEDURE — 71046 PR XRAY, CHEST, 2 VIEWS: ICD-10-PCS | Mod: S$GLB,,, | Performed by: NURSE PRACTITIONER

## 2020-11-18 PROCEDURE — 87804 POCT INFLUENZA A/B: ICD-10-PCS | Mod: QW,,, | Performed by: NURSE PRACTITIONER

## 2020-11-18 PROCEDURE — 87804 INFLUENZA ASSAY W/OPTIC: CPT | Mod: QW,,, | Performed by: NURSE PRACTITIONER

## 2020-11-18 PROCEDURE — 99204 OFFICE O/P NEW MOD 45 MIN: CPT | Mod: 25,S$GLB,, | Performed by: NURSE PRACTITIONER

## 2020-11-18 PROCEDURE — 94640 AIRWAY INHALATION TREATMENT: CPT | Mod: S$GLB,,, | Performed by: NURSE PRACTITIONER

## 2020-11-18 PROCEDURE — 87635 PR SARS-COV-2 COVID-19 AMPLIFIED PROBE: ICD-10-PCS | Mod: QW,S$GLB,, | Performed by: NURSE PRACTITIONER

## 2020-11-18 PROCEDURE — 87635 SARS-COV-2 COVID-19 AMP PRB: CPT | Mod: QW,S$GLB,, | Performed by: NURSE PRACTITIONER

## 2020-11-18 PROCEDURE — 99204 PR OFFICE/OUTPT VISIT, NEW, LEVL IV, 45-59 MIN: ICD-10-PCS | Mod: 25,S$GLB,, | Performed by: NURSE PRACTITIONER

## 2020-11-18 PROCEDURE — 94640 PR INHAL RX, AIRWAY OBST/DX SPUTUM INDUCT: ICD-10-PCS | Mod: S$GLB,,, | Performed by: NURSE PRACTITIONER

## 2020-11-18 RX ORDER — FLUTICASONE PROPIONATE 50 MCG
2 SPRAY, SUSPENSION (ML) NASAL DAILY
Qty: 15.8 ML | Refills: 0 | Status: SHIPPED | OUTPATIENT
Start: 2020-11-18 | End: 2022-04-15

## 2020-11-18 RX ORDER — ALBUTEROL SULFATE 90 UG/1
2 AEROSOL, METERED RESPIRATORY (INHALATION) EVERY 6 HOURS PRN
Qty: 18 G | Refills: 0 | Status: SHIPPED | OUTPATIENT
Start: 2020-11-18 | End: 2022-04-15

## 2020-11-18 RX ORDER — CETIRIZINE HYDROCHLORIDE 10 MG/1
10 TABLET ORAL DAILY
Qty: 30 TABLET | Refills: 0 | Status: SHIPPED | OUTPATIENT
Start: 2020-11-18 | End: 2022-04-15

## 2020-11-18 RX ORDER — DEXCHLORPHENIRAMINE MALEATE, DEXTROMETHORPHAN HBR, PHENYLEPHRINE HCL 1; 10; 5 MG/5ML; MG/5ML; MG/5ML
5 SYRUP ORAL EVERY 4 HOURS PRN
Qty: 120 ML | Refills: 0 | Status: ON HOLD | OUTPATIENT
Start: 2020-11-18 | End: 2023-02-28 | Stop reason: HOSPADM

## 2020-11-18 RX ORDER — PREDNISONE 20 MG/1
40 TABLET ORAL DAILY
Qty: 10 TABLET | Refills: 0 | Status: SHIPPED | OUTPATIENT
Start: 2020-11-18 | End: 2020-11-23

## 2020-11-18 RX ORDER — DEXAMETHASONE SODIUM PHOSPHATE 4 MG/ML
8 INJECTION, SOLUTION INTRA-ARTICULAR; INTRALESIONAL; INTRAMUSCULAR; INTRAVENOUS; SOFT TISSUE
Status: COMPLETED | OUTPATIENT
Start: 2020-11-18 | End: 2020-11-18

## 2020-11-18 RX ORDER — IPRATROPIUM BROMIDE 0.5 MG/2.5ML
0.5 SOLUTION RESPIRATORY (INHALATION)
Status: COMPLETED | OUTPATIENT
Start: 2020-11-18 | End: 2020-11-18

## 2020-11-18 RX ORDER — ALBUTEROL SULFATE 0.83 MG/ML
2.5 SOLUTION RESPIRATORY (INHALATION)
Status: COMPLETED | OUTPATIENT
Start: 2020-11-18 | End: 2020-11-18

## 2020-11-18 RX ORDER — ALBUTEROL SULFATE 0.83 MG/ML
2.5 SOLUTION RESPIRATORY (INHALATION) EVERY 6 HOURS PRN
Qty: 1 BOX | Refills: 0 | Status: SHIPPED | OUTPATIENT
Start: 2020-11-18 | End: 2021-11-18

## 2020-11-18 RX ORDER — AZITHROMYCIN 250 MG/1
TABLET, FILM COATED ORAL
Qty: 6 TABLET | Refills: 0 | Status: SHIPPED | OUTPATIENT
Start: 2020-11-18 | End: 2022-04-15

## 2020-11-18 RX ADMIN — ALBUTEROL SULFATE 2.5 MG: 0.83 SOLUTION RESPIRATORY (INHALATION) at 03:11

## 2020-11-18 RX ADMIN — IPRATROPIUM BROMIDE 0.5 MG: 0.5 SOLUTION RESPIRATORY (INHALATION) at 03:11

## 2020-11-18 RX ADMIN — DEXAMETHASONE SODIUM PHOSPHATE 8 MG: 4 INJECTION, SOLUTION INTRA-ARTICULAR; INTRALESIONAL; INTRAMUSCULAR; INTRAVENOUS; SOFT TISSUE at 04:11

## 2020-11-18 NOTE — PATIENT INSTRUCTIONS
Bronchitis with Wheezing (Viral or Bacterial: Adult)    Bronchitis is an infection of the air passages. It often occurs during a cold and is usually caused by a virus. Symptoms include cough with mucus (phlegm) and low-grade fever. This illness is contagious during the first few days and is spread through the air by coughing and sneezing, or by direct contact (touching the sick person and then touching your own eyes, nose, or mouth).  If there is a lot of inflammation, air flow is restricted. The air passages may also go into spasm, especially if you have asthma. This causes wheezing and difficulty breathing even in people who do not have asthma.  Bronchitis usually lasts 7 to 14 days. The wheezing should improve with treatment during the first week. An inhaler is often prescribed to relax the air passages and stop wheezing. Antibiotics will be prescribed if your doctor thinks there is also a secondary bacterial infection.  Home care  · If symptoms are severe, rest at home for the first 2 to 3 days. When you go back to your usual activities, don't let yourself get too tired.  · Do not smoke. Also avoid being exposed to secondhand smoke.  · You may use over-the-counter medicine to control fever or pain, unless another medicine was prescribed. Note: If you have chronic liver or kidney disease or have ever had a stomach ulcer or gastrointestinal bleeding, talk with your healthcare provider before using these medicines. Also talk to your provider if you are taking medicine to prevent blood clots.) Aspirin should never be given to anyone younger than 18 years of age who is ill with a viral infection or fever. It may cause severe liver or brain damage.  · Your appetite may be poor, so a light diet is fine. Avoid dehydration by drinking 6 to 8 glasses of fluids per day (such as water, soft drinks, sports drinks, juices, tea, or soup). Extra fluids will help loosen secretions in the nose and lungs.  · Over-the-counter  cough, cold, and sore-throat medicines will not shorten the length of the illness, but they may be helpful to reduce symptoms. (Note: Do not use decongestants if you have high blood pressure.)  · If you were given an inhaler, use it exactly as directed. If you need to use it more often than prescribed, your condition may be worsening. If this happens, contact your healthcare provider.  · If prescribed, finish all antibiotic medicine, even if you are feeling better after only a few days.  Follow-up care  Follow up with your healthcare provider, or as advised. If you had an X-ray or ECG (electrocardiogram), a specialist will review it. You will be notified of any new findings that may affect your care.  Note: If you are age 65 or older, or if you have a chronic lung disease or condition that affects your immune system, or you smoke, talk to your healthcare provider about having a pneumococcal vaccinations and a yearly influenza vaccination (flu shot).  When to seek medical advice  Call your healthcare provider right away if any of these occur:  · Fever of 100.4°F (38°C) or higher  · Coughing up increasing amounts of colored sputum  · Weakness, drowsiness, headache, facial pain, ear pain, or a stiff neck  Call 911, or get immediate medical care  Contact emergency services right away if any of these occur.  · Coughing up blood  · Worsening weakness, drowsiness, headache, or stiff neck  · Increased wheezing not helped with medication, shortness of breath, or pain with breathing  Date Last Reviewed: 9/13/2015  © 4776-1297 [employer]. [address] [place] [address], [place] [address]. All rights reserved. This information is not intended as a substitute for professional medical care. Always follow your healthcare professional's instructions.        Bronchitis, Antibiotic Treatment (Adult)    Bronchitis is an infection of the air passages (bronchial tubes) in your lungs. It often occurs when you have a cold. This  illness is contagious during the first few days and is spread through the air by coughing and sneezing, or by direct contact (touching the sick person and then touching your own eyes, nose, or mouth).  Symptoms of bronchitis include cough with mucus (phlegm) and low-grade fever. Bronchitis usually lasts 7 to 14 days. Mild cases can be treated with simple home remedies. More severe infection is treated with an antibiotic.  Home care  Follow these guidelines when caring for yourself at home:  · If your symptoms are severe, rest at home for the first 2 to 3 days. When you go back to your usual activities, don't let yourself get too tired.  · Do not smoke. Also avoid being exposed to secondhand smoke.  · You may use over-the-counter medicines to control fever or pain, unless another medicine was prescribed. (Note: If you have chronic liver or kidney disease or have ever had a stomach ulcer or gastrointestinal bleeding, talk with your healthcare provider before using these medicines. Also talk to your provider if you are taking medicine to prevent blood clots.) Aspirin should never be given to anyone younger than 18 years of age who is ill with a viral infection or fever. It may cause severe liver or brain damage.  · Your appetite may be poor, so a light diet is fine. Avoid dehydration by drinking 6 to 8 glasses of fluids per day (such as water, soft drinks, sports drinks, juices, tea, or soup). Extra fluids will help loosen secretions in the nose and lungs.  · Over-the-counter cough, cold, and sore-throat medicines will not shorten the length of the illness, but they may be helpful to reduce symptoms. (Note: Do not use decongestants if you have high blood pressure.)  · Finish all antibiotic medicine. Do this even if you are feeling better after only a few days.  Follow-up care  Follow up with your healthcare provider, or as advised. If you had an X-ray or ECG (electrocardiogram), a specialist will review it. You will be  notified of any new findings that may affect your care.  Note: If you are age 65 or older, or if you have a chronic lung disease or condition that affects your immune system, or you smoke, talk to your healthcare provider about having pneumococcal vaccinations and a yearly influenza vaccination (flu shot).  When to seek medical advice  Call your healthcare provider right away if any of these occur:  · Fever of 100.4°F (38°C) or higher  · Coughing up increased amounts of colored sputum  · Weakness, drowsiness, headache, facial pain, ear pain, or a stiff neck  Call 911, or get immediate medical care  Contact emergency services right away if any of these occur.  · Coughing up blood  · Worsening weakness, drowsiness, headache, or stiff neck  · Trouble breathing, wheezing, or pain with breathing  Date Last Reviewed: 9/13/2015  © 6133-6553 [employer]. [address] [place] [address]. All rights reserved. This information is not intended as a substitute for professional medical care. Always follow your healthcare professional's instructions.

## 2020-11-18 NOTE — PROGRESS NOTES
Subjective: cough, congestion x 4 days       Patient ID: Elder Rosales is a 40 y.o. male.    Vitals:  weight is 89.8 kg (198 lb). His blood pressure is 159/94 (abnormal) and his pulse is 91. His respiration is 16 and oxygen saturation is 96%.     Chief Complaint: Cough (cough, congestion x4 days)    Cough  This is a new problem. The current episode started in the past 7 days. Associated symptoms include wheezing. Pertinent negatives include no chest pain, chills, fever, headaches, myalgias, rash, sore throat or shortness of breath.       Constitution: Negative for chills, fatigue and fever.   HENT: Negative for congestion and sore throat.    Neck: Negative for painful lymph nodes.   Cardiovascular: Negative for chest pain and leg swelling.   Eyes: Negative for double vision and blurred vision.   Respiratory: Positive for cough and wheezing. Negative for shortness of breath.    Gastrointestinal: Negative for abdominal pain, nausea, vomiting and diarrhea.   Genitourinary: Negative for dysuria, frequency and urgency.   Musculoskeletal: Negative for joint pain, joint swelling, muscle cramps and muscle ache.   Skin: Negative for color change, pale and rash.   Allergic/Immunologic: Negative for seasonal allergies.   Neurological: Negative for dizziness, history of vertigo, light-headedness, passing out and headaches.   Hematologic/Lymphatic: Negative for swollen lymph nodes, easy bruising/bleeding and history of blood clots. Does not bruise/bleed easily.   Psychiatric/Behavioral: Negative for nervous/anxious, sleep disturbance and depression. The patient is not nervous/anxious.        Objective:      Physical Exam   Constitutional: He is oriented to person, place, and time. He appears well-developed. He is cooperative.  Non-toxic appearance. He does not appear ill. No distress.   HENT:   Head: Normocephalic and atraumatic.   Ears:   Right Ear: Hearing, tympanic membrane, external ear and ear canal normal.   Left  Ear: Hearing, tympanic membrane, external ear and ear canal normal.   Nose: Nose normal. No mucosal edema, rhinorrhea or nasal deformity. No epistaxis. Right sinus exhibits no maxillary sinus tenderness and no frontal sinus tenderness. Left sinus exhibits no maxillary sinus tenderness and no frontal sinus tenderness.   Mouth/Throat: Uvula is midline, oropharynx is clear and moist and mucous membranes are normal. No trismus in the jaw. Normal dentition. No uvula swelling. No posterior oropharyngeal erythema.   Eyes: Conjunctivae and lids are normal. Right eye exhibits no discharge. Left eye exhibits no discharge. No scleral icterus.   Neck: Trachea normal, normal range of motion, full passive range of motion without pain and phonation normal. Neck supple.   Cardiovascular: Normal rate, regular rhythm, normal heart sounds and normal pulses.   Pulmonary/Chest: Effort normal. No respiratory distress. He has wheezes in the right upper field, the right lower field, the left upper field and the left lower field.   Abdominal: Soft. Normal appearance and bowel sounds are normal. He exhibits no distension, no pulsatile midline mass and no mass. There is no abdominal tenderness.   Musculoskeletal: Normal range of motion.         General: No deformity.   Neurological: He is alert and oriented to person, place, and time. He exhibits normal muscle tone. Coordination normal. GCS eye subscore is 4. GCS verbal subscore is 5. GCS motor subscore is 6.   Skin: Skin is warm, dry, intact, not diaphoretic and not pale. Psychiatric: His speech is normal and behavior is normal. Judgment and thought content normal.   Nursing note and vitals reviewed.        Assessment:       1. Wheezing    2. Bronchitis    3. Cough        Plan:       Influenza negative. Covid negative. CXR negative for acute findings.     Still wheezing throughout, but much improved, sats increased from 96% to 98% after the breathing treatment , and no respiratory distress.   I do not suspect pneumonia, pulmonary embolism or any other more serious condition requiring further treatment. Advised pt to return to clinic or go to ER for any worsening of symptoms. Based on my clinical evaluation, I do not appreciate any immediate, emergent, or life threatening condition or etiology that warrants additional workup today and feel that the patient can be discharged with close follow up care.    Wheezing    Bronchitis    Cough  -     POCT Influenza A/B  -     Cancel: POCT rapid strep A  -     X-Ray Chest PA And Lateral; Future; Expected date: 11/18/2020  -     POCT COVID-19 Rapid Screening    Other orders  -     albuterol nebulizer solution 2.5 mg  -     ipratropium 0.02 % nebulizer solution 0.5 mg  -     dexamethasone injection 8 mg  -     cetirizine (ZYRTEC) 10 MG tablet; Take 1 tablet (10 mg total) by mouth once daily.  Dispense: 30 tablet; Refill: 0  -     predniSONE (DELTASONE) 20 MG tablet; Take 2 tablets (40 mg total) by mouth once daily. for 5 days  Dispense: 10 tablet; Refill: 0  -     dexchlorphen-phenylephrine-DM (POLYTUSSIN DM) 1-5-10 mg/5 mL Syrp; Take 5 mLs by mouth every 4 (four) hours as needed.  Dispense: 120 mL; Refill: 0  -     fluticasone propionate (FLONASE) 50 mcg/actuation nasal spray; 2 sprays (100 mcg total) by Each Nostril route once daily.  Dispense: 15.8 mL; Refill: 0  -     albuterol (PROVENTIL) 2.5 mg /3 mL (0.083 %) nebulizer solution; Take 3 mLs (2.5 mg total) by nebulization every 6 (six) hours as needed for Wheezing. Rescue  Dispense: 1 Box; Refill: 0  -     azithromycin (ZITHROMAX) 250 MG tablet; Take 2 tablets (500 mg) on  Day 1,  followed by 1 tablet (250 mg) once daily on Days 2 through 5.  Dispense: 6 tablet; Refill: 0  -     albuterol (PROVENTIL HFA) 90 mcg/actuation inhaler; Inhale 2 puffs into the lungs every 6 (six) hours as needed for Wheezing. Rescue  Dispense: 18 g; Refill: 0

## 2020-11-20 ENCOUNTER — TELEPHONE (OUTPATIENT)
Dept: HEPATOLOGY | Facility: CLINIC | Age: 40
End: 2020-11-20

## 2020-11-20 DIAGNOSIS — B18.2 CHRONIC HEPATITIS C WITHOUT HEPATIC COMA: Primary | ICD-10-CM

## 2020-11-20 NOTE — TELEPHONE ENCOUNTER
----- Message from Marley Menendez NP sent at 11/19/2020  3:06 PM CST -----  Good Afternoon-    Mr. Rosales was living in [place] where I saw him and have since treated him for his HCV. He is currently on Epclusa and has around 15 days left. He has since moved to [place] and is looking for someone to follow up with for SVR labs. Can he be seen in your clinic or should I have him reach out and schedule a primary care visit?     Marley Menendez, MSN,APRN, AGACNP-BC  Nurse Practitioner-Infectious Disease

## 2020-11-20 NOTE — TELEPHONE ENCOUNTER
[facility]?  FibroScan F2 (kPa 7.2)  9/2020  Tx naive    pls contact pt:  Explain that this is the HCV clinic at [facility]. I received notification from [facility] office that he had moved to [facility] while on HCV treatment. They've asked me if I can take over his monitoring.    From what I see, he started Epclusa 9/5/20 and recently received his 3rd shipment of Epclusa  · pls make sure he is still taking it & find out how much he has left  · pls make sure he's not having any trouble with it.  · Tell him to continue epclusa - 1 pill each day until he runs out.  ·   There is a small chance the virus can return during the 3 months after treatment ends. We will monitor blood for this.     If he has any trouble w/ HCV meds have him call us - pls give contact info    pls schedule next labs to see if Hep C is cured are due: 2/19/21 - CMP, HCV RNA

## 2021-03-05 ENCOUNTER — LAB VISIT (OUTPATIENT)
Dept: LAB | Facility: HOSPITAL | Age: 41
End: 2021-03-05
Attending: PHYSICIAN ASSISTANT
Payer: MEDICAID

## 2021-03-05 DIAGNOSIS — B18.2 CHRONIC HEPATITIS C WITHOUT HEPATIC COMA: ICD-10-CM

## 2021-03-05 PROCEDURE — 87522 HEPATITIS C REVRS TRNSCRPJ: CPT | Performed by: PHYSICIAN ASSISTANT

## 2021-03-05 PROCEDURE — 80053 COMPREHEN METABOLIC PANEL: CPT | Performed by: PHYSICIAN ASSISTANT

## 2021-03-06 LAB
ALBUMIN SERPL BCP-MCNC: 4.6 G/DL (ref 3.5–5.2)
ALP SERPL-CCNC: 79 U/L (ref 55–135)
ALT SERPL W/O P-5'-P-CCNC: 38 U/L (ref 10–44)
ANION GAP SERPL CALC-SCNC: 15 MMOL/L (ref 8–16)
AST SERPL-CCNC: 29 U/L (ref 10–40)
BILIRUB SERPL-MCNC: 1 MG/DL (ref 0.1–1)
BUN SERPL-MCNC: 9 MG/DL (ref 6–20)
CALCIUM SERPL-MCNC: 9.4 MG/DL (ref 8.7–10.5)
CHLORIDE SERPL-SCNC: 104 MMOL/L (ref 95–110)
CO2 SERPL-SCNC: 24 MMOL/L (ref 23–29)
CREAT SERPL-MCNC: 1 MG/DL (ref 0.5–1.4)
EST. GFR  (AFRICAN AMERICAN): >60 ML/MIN/1.73 M^2
EST. GFR  (NON AFRICAN AMERICAN): >60 ML/MIN/1.73 M^2
GLUCOSE SERPL-MCNC: 84 MG/DL (ref 70–110)
POTASSIUM SERPL-SCNC: 3.5 MMOL/L (ref 3.5–5.1)
PROT SERPL-MCNC: 7.9 G/DL (ref 6–8.4)
SODIUM SERPL-SCNC: 143 MMOL/L (ref 136–145)

## 2021-03-11 ENCOUNTER — TELEPHONE (OUTPATIENT)
Dept: HEPATOLOGY | Facility: CLINIC | Age: 41
End: 2021-03-11

## 2021-03-11 DIAGNOSIS — Z86.19 HEPATITIS C VIRUS INFECTION CURED AFTER ANTIVIRAL DRUG THERAPY: ICD-10-CM

## 2021-03-11 LAB
HCV RNA SERPL NAA+PROBE-LOG IU: <1.08 LOG (10) IU/ML
HCV RNA SERPL QL NAA+PROBE: NOT DETECTED IU/ML
HCV RNA SPEC NAA+PROBE-ACNC: <12 IU/ML

## 2021-11-06 ENCOUNTER — HOSPITAL ENCOUNTER (EMERGENCY)
Facility: HOSPITAL | Age: 41
Discharge: HOME | End: 2021-11-06
Attending: EMERGENCY MEDICINE
Payer: MEDICAID

## 2021-11-06 VITALS
WEIGHT: 225 LBS | BODY MASS INDEX: 30.48 KG/M2 | SYSTOLIC BLOOD PRESSURE: 141 MMHG | RESPIRATION RATE: 18 BRPM | TEMPERATURE: 98 F | HEIGHT: 72 IN | HEART RATE: 74 BPM | OXYGEN SATURATION: 97 % | DIASTOLIC BLOOD PRESSURE: 87 MMHG

## 2021-11-06 DIAGNOSIS — R09.A2 FOREIGN BODY SENSATION IN THROAT: Primary | ICD-10-CM

## 2021-11-06 DIAGNOSIS — K22.9 ESOPHAGEAL ABNORMALITY: ICD-10-CM

## 2021-11-06 DIAGNOSIS — R07.89 CHEST DISCOMFORT: ICD-10-CM

## 2021-11-06 DIAGNOSIS — R11.2 NAUSEA & VOMITING: ICD-10-CM

## 2021-11-06 DIAGNOSIS — R07.9 CHEST PAIN: ICD-10-CM

## 2021-11-06 LAB
ALBUMIN SERPL BCP-MCNC: 4.2 G/DL (ref 3.5–5.2)
ALP SERPL-CCNC: 62 U/L (ref 55–135)
ALT SERPL W/O P-5'-P-CCNC: 15 U/L (ref 10–44)
ANION GAP SERPL CALC-SCNC: 12 MMOL/L (ref 8–16)
AST SERPL-CCNC: 19 U/L (ref 10–40)
BASOPHILS # BLD AUTO: 0.04 K/UL (ref 0–0.2)
BASOPHILS NFR BLD: 0.5 % (ref 0–1.9)
BILIRUB SERPL-MCNC: 1.2 MG/DL (ref 0.1–1)
BUN SERPL-MCNC: 9 MG/DL (ref 6–20)
CALCIUM SERPL-MCNC: 8.9 MG/DL (ref 8.7–10.5)
CHLORIDE SERPL-SCNC: 96 MMOL/L (ref 95–110)
CO2 SERPL-SCNC: 25 MMOL/L (ref 23–29)
CREAT SERPL-MCNC: 0.8 MG/DL (ref 0.5–1.4)
DIFFERENTIAL METHOD: ABNORMAL
EOSINOPHIL # BLD AUTO: 0 K/UL (ref 0–0.5)
EOSINOPHIL NFR BLD: 0.5 % (ref 0–8)
ERYTHROCYTE [DISTWIDTH] IN BLOOD BY AUTOMATED COUNT: 13.1 % (ref 11.5–14.5)
EST. GFR  (AFRICAN AMERICAN): >60 ML/MIN/1.73 M^2
EST. GFR  (NON AFRICAN AMERICAN): >60 ML/MIN/1.73 M^2
GLUCOSE SERPL-MCNC: 114 MG/DL (ref 70–110)
GLUCOSE SERPL-MCNC: 123 MG/DL (ref 70–110)
HCT VFR BLD AUTO: 47.6 % (ref 40–54)
HGB BLD-MCNC: 16.2 G/DL (ref 14–18)
IMM GRANULOCYTES # BLD AUTO: 0.06 K/UL (ref 0–0.04)
IMM GRANULOCYTES NFR BLD AUTO: 0.7 % (ref 0–0.5)
LYMPHOCYTES # BLD AUTO: 1.5 K/UL (ref 1–4.8)
LYMPHOCYTES NFR BLD: 17.6 % (ref 18–48)
MCH RBC QN AUTO: 28.8 PG (ref 27–31)
MCHC RBC AUTO-ENTMCNC: 34 G/DL (ref 32–36)
MCV RBC AUTO: 85 FL (ref 82–98)
MONOCYTES # BLD AUTO: 0.5 K/UL (ref 0.3–1)
MONOCYTES NFR BLD: 6.3 % (ref 4–15)
NEUTROPHILS # BLD AUTO: 6.4 K/UL (ref 1.8–7.7)
NEUTROPHILS NFR BLD: 74.4 % (ref 38–73)
NRBC BLD-RTO: 0 /100 WBC
PLATELET # BLD AUTO: 288 K/UL (ref 150–450)
PMV BLD AUTO: 10.9 FL (ref 9.2–12.9)
POTASSIUM SERPL-SCNC: 3.2 MMOL/L (ref 3.5–5.1)
PROT SERPL-MCNC: 7.4 G/DL (ref 6–8.4)
RBC # BLD AUTO: 5.63 M/UL (ref 4.6–6.2)
SARS-COV-2 RDRP RESP QL NAA+PROBE: NEGATIVE
SODIUM SERPL-SCNC: 133 MMOL/L (ref 136–145)
TROPONIN I SERPL DL<=0.01 NG/ML-MCNC: <0.03 NG/ML
WBC # BLD AUTO: 8.6 K/UL (ref 3.9–12.7)

## 2021-11-06 PROCEDURE — 25000003 PHARM REV CODE 250: Performed by: EMERGENCY MEDICINE

## 2021-11-06 PROCEDURE — 80053 COMPREHEN METABOLIC PANEL: CPT | Performed by: EMERGENCY MEDICINE

## 2021-11-06 PROCEDURE — 96374 THER/PROPH/DIAG INJ IV PUSH: CPT

## 2021-11-06 PROCEDURE — 63600175 PHARM REV CODE 636 W HCPCS: Mod: JG | Performed by: EMERGENCY MEDICINE

## 2021-11-06 PROCEDURE — 99284 EMERGENCY DEPT VISIT MOD MDM: CPT | Mod: 25

## 2021-11-06 PROCEDURE — 93005 ELECTROCARDIOGRAM TRACING: CPT | Performed by: INTERNAL MEDICINE

## 2021-11-06 PROCEDURE — 93010 EKG 12-LEAD: ICD-10-PCS | Mod: ,,, | Performed by: INTERNAL MEDICINE

## 2021-11-06 PROCEDURE — U0002 COVID-19 LAB TEST NON-CDC: HCPCS | Performed by: EMERGENCY MEDICINE

## 2021-11-06 PROCEDURE — 82962 GLUCOSE BLOOD TEST: CPT

## 2021-11-06 PROCEDURE — 84484 ASSAY OF TROPONIN QUANT: CPT | Performed by: EMERGENCY MEDICINE

## 2021-11-06 PROCEDURE — 93010 ELECTROCARDIOGRAM REPORT: CPT | Mod: ,,, | Performed by: INTERNAL MEDICINE

## 2021-11-06 PROCEDURE — 96375 TX/PRO/DX INJ NEW DRUG ADDON: CPT

## 2021-11-06 PROCEDURE — 85025 COMPLETE CBC W/AUTO DIFF WBC: CPT | Performed by: EMERGENCY MEDICINE

## 2021-11-06 RX ORDER — SUCRALFATE 1 G/10ML
1 SUSPENSION ORAL EVERY 6 HOURS
Status: DISCONTINUED | OUTPATIENT
Start: 2021-11-07 | End: 2021-11-06 | Stop reason: HOSPADM

## 2021-11-06 RX ORDER — GLUCAGON 1 MG
1 KIT INJECTION ONCE
Status: COMPLETED | OUTPATIENT
Start: 2021-11-06 | End: 2021-11-06

## 2021-11-06 RX ORDER — LORAZEPAM 2 MG/ML
1 INJECTION INTRAMUSCULAR ONCE
Status: COMPLETED | OUTPATIENT
Start: 2021-11-06 | End: 2021-11-06

## 2021-11-06 RX ADMIN — SUCRALFATE 1 G: 1 SUSPENSION ORAL at 09:11

## 2021-11-06 RX ADMIN — GLUCAGON 1 MG: KIT at 06:11

## 2021-11-06 RX ADMIN — DICYCLOMINE HYDROCHLORIDE 50 ML: 10 SOLUTION ORAL at 07:11

## 2021-11-06 RX ADMIN — LORAZEPAM 1 MG: 2 INJECTION INTRAMUSCULAR; INTRAVENOUS at 07:11

## 2022-01-17 ENCOUNTER — HOSPITAL ENCOUNTER (EMERGENCY)
Facility: HOSPITAL | Age: 42
Discharge: HOME | End: 2022-01-17
Attending: EMERGENCY MEDICINE
Payer: MEDICAID

## 2022-01-17 VITALS
SYSTOLIC BLOOD PRESSURE: 135 MMHG | DIASTOLIC BLOOD PRESSURE: 106 MMHG | HEIGHT: 72 IN | WEIGHT: 225 LBS | BODY MASS INDEX: 30.48 KG/M2 | TEMPERATURE: 98 F | HEART RATE: 77 BPM | RESPIRATION RATE: 16 BRPM | OXYGEN SATURATION: 96 %

## 2022-01-17 DIAGNOSIS — R07.81 RIB PAIN: Primary | ICD-10-CM

## 2022-01-17 PROCEDURE — 99283 EMERGENCY DEPT VISIT LOW MDM: CPT

## 2022-01-17 NOTE — ED PROVIDER NOTES
Encounter Date: 1/17/2022       History     Chief Complaint   Patient presents with    Rib Injury     Pt slipped into tub onto his right side.      41 Year old well-appearing male presents emergency department reports that he slipped in a tub 2 days ago fell on his right side complaining of pain to the right lateral ribs.  Patient reports breathing touching and movement makes the pain worse nothing makes the pain better patient denies nausea vomiting abdominal pain has no obvious trauma noted to his head or face.        Review of patient's allergies indicates:  No Known Allergies  Past Medical History:   Diagnosis Date    Anxiety     Carpal tunnel syndrome     Hepatitis C virus infection cured after antiviral drug therapy     s/p epclusa, svr12 - 3/2021 (treated / cured)    Shoulder disorder      No past surgical history on file.  Family History   Problem Relation Age of Onset    No Known Problems Mother     No Known Problems Father      Social History     Tobacco Use    Smoking status: Current Every Day Smoker     Packs/day: 0.50     Types: Cigarettes    Smokeless tobacco: Never Used   Substance Use Topics    Alcohol use: Yes     Comment: bourbon    Drug use: Not Currently     Types: Marijuana, Cocaine, Anabolic steroids     Review of Systems   Constitutional: Negative.    HENT: Negative.    Respiratory:        Right rib pain    Cardiovascular: Negative.    Gastrointestinal: Negative.    Genitourinary: Negative.    Musculoskeletal: Negative.    Skin: Negative.    Hematological: Negative.    Psychiatric/Behavioral: Negative.    All other systems reviewed and are negative.      Physical Exam     Initial Vitals [01/17/22 0947]   BP Pulse Resp Temp SpO2   (!) 135/106 77 16 98.2 °F (36.8 °C) 96 %      MAP       --         Physical Exam    Nursing note and vitals reviewed.  Constitutional: He appears well-developed and well-nourished.   HENT:   Head: Normocephalic and atraumatic.   Eyes: EOM are normal. Pupils  are equal, round, and reactive to light.   Neck:   Normal range of motion.  Cardiovascular: Normal rate, regular rhythm, normal heart sounds and intact distal pulses.   Pulmonary/Chest: Breath sounds normal. No respiratory distress. He has no wheezes. He exhibits tenderness.   Abdominal: Abdomen is soft. Bowel sounds are normal. He exhibits no distension. There is no abdominal tenderness.   Musculoskeletal:      Cervical back: Normal range of motion.     Neurological: He is alert and oriented to person, place, and time. He has normal strength. GCS score is 15. GCS eye subscore is 4. GCS verbal subscore is 5. GCS motor subscore is 6.   Skin: Skin is warm and dry.   Psychiatric: He has a normal mood and affect.         ED Course   Procedures  Labs Reviewed - No data to display       Imaging Results          XR Ribs Min 3 Views w/PA Chest Right (Final result)  Result time 01/17/22 10:21:11    Final result by Naren Mendez MD (01/17/22 10:21:11)                 Narrative:    PA chest with right RIBS    CLINICAL DATA: Trauma, right-sided chest pain    FINDINGS: PA view of the chest is compared to November 6, 2021.    Heart size is normal. The mediastinum is unremarkable. No infiltrates or effusions are identified. There is no evidence of pleural effusion or pneumothorax.    4 views targeted to the right ribs demonstrate no fracture or osseous destructive lesion.    IMPRESSION:  1. No acute radiographic abnormalities.    Electronically signed by:  Naren Mendez MD  1/17/2022 10:21 AM [facility] Workstation: [id number]                               Medications - No data to display  Medical Decision Making:   Initial Assessment:   Year old well-appearing male presents emergency department reports that he slipped in a tub 2 days ago fell on his right side complaining of pain to the right lateral ribs.  Patient reports breathing touching and movement makes the pain worse nothing makes the pain better patient denies nausea  vomiting abdominal pain has no obvious trauma noted to his head or face.        Differential Diagnosis:   Fracture, contusion  ED Management:  Patient presents emergency department reports that he slipped and fell in a tub 2 days prior to arrival complaining of right lateral rib pain his no obvious signs of trauma noted to his chest there is no crepitus he is oxygenating well on room air plain films reveal no obvious acute fracture there is no evidence of pneumothorax.  The patient was instructed to refrain from smoking he was instructed on frequent deep breathing the patient was requesting pain medications however on review of [place] prescription monitoring program the patient does receive Suboxone therefore no further pain medications were given.  Patient was given detailed return precautions                      Clinical Impression:   Final diagnoses:  [R07.81] Rib pain (Primary)          ED Disposition Condition    Discharge Stable        ED Prescriptions     None        Follow-up Information     Follow up With Specialties Details Why Contact [facility]  Schedule an appointment as soon as possible for a visit in 2 days  [address]  [facility] [id number]  [phone]             MAGALIE Guido  01/18/22 0816

## 2022-01-17 NOTE — DISCHARGE INSTRUCTIONS
Motrin or tylenol for pain   Refrain from smoking  Take some frequent deep breaths   DO NOT wrap or bind your chest   Return for any concerns

## 2022-04-15 ENCOUNTER — OFFICE VISIT (OUTPATIENT)
Dept: URGENT CARE | Facility: CLINIC | Age: 42
End: 2022-04-15
Payer: MEDICAID

## 2022-04-15 VITALS
WEIGHT: 184 LBS | RESPIRATION RATE: 20 BRPM | BODY MASS INDEX: 24.92 KG/M2 | HEIGHT: 72 IN | OXYGEN SATURATION: 97 % | HEART RATE: 76 BPM | TEMPERATURE: 100 F | SYSTOLIC BLOOD PRESSURE: 133 MMHG | DIASTOLIC BLOOD PRESSURE: 84 MMHG

## 2022-04-15 DIAGNOSIS — F19.11 HISTORY OF SUBSTANCE ABUSE: ICD-10-CM

## 2022-04-15 DIAGNOSIS — J32.9 RHINOSINUSITIS: ICD-10-CM

## 2022-04-15 DIAGNOSIS — F41.9 ANXIETY: ICD-10-CM

## 2022-04-15 DIAGNOSIS — R42 VERTIGO: ICD-10-CM

## 2022-04-15 DIAGNOSIS — R07.89 CHEST HEAVINESS: Primary | ICD-10-CM

## 2022-04-15 DIAGNOSIS — Z86.79 HISTORY OF HYPERTENSION: ICD-10-CM

## 2022-04-15 PROCEDURE — 93000 PR ELECTROCARDIOGRAM, COMPLETE: ICD-10-PCS | Mod: S$GLB,,, | Performed by: EMERGENCY MEDICINE

## 2022-04-15 PROCEDURE — 1160F PR REVIEW ALL MEDS BY PRESCRIBER/CLIN PHARMACIST DOCUMENTED: ICD-10-PCS | Mod: CPTII,S$GLB,, | Performed by: NURSE PRACTITIONER

## 2022-04-15 PROCEDURE — 99214 PR OFFICE/OUTPT VISIT, EST, LEVL IV, 30-39 MIN: ICD-10-PCS | Mod: S$GLB,,, | Performed by: NURSE PRACTITIONER

## 2022-04-15 PROCEDURE — 93000 ELECTROCARDIOGRAM COMPLETE: CPT | Mod: S$GLB,,, | Performed by: EMERGENCY MEDICINE

## 2022-04-15 PROCEDURE — 3008F PR BODY MASS INDEX (BMI) DOCUMENTED: ICD-10-PCS | Mod: CPTII,S$GLB,, | Performed by: NURSE PRACTITIONER

## 2022-04-15 PROCEDURE — 3079F PR MOST RECENT DIASTOLIC BLOOD PRESSURE 80-89 MM HG: ICD-10-PCS | Mod: CPTII,S$GLB,, | Performed by: NURSE PRACTITIONER

## 2022-04-15 PROCEDURE — 1159F MED LIST DOCD IN RCRD: CPT | Mod: CPTII,S$GLB,, | Performed by: NURSE PRACTITIONER

## 2022-04-15 PROCEDURE — 99214 OFFICE O/P EST MOD 30 MIN: CPT | Mod: S$GLB,,, | Performed by: NURSE PRACTITIONER

## 2022-04-15 PROCEDURE — 3075F SYST BP GE 130 - 139MM HG: CPT | Mod: CPTII,S$GLB,, | Performed by: NURSE PRACTITIONER

## 2022-04-15 PROCEDURE — 3075F PR MOST RECENT SYSTOLIC BLOOD PRESS GE 130-139MM HG: ICD-10-PCS | Mod: CPTII,S$GLB,, | Performed by: NURSE PRACTITIONER

## 2022-04-15 PROCEDURE — 3008F BODY MASS INDEX DOCD: CPT | Mod: CPTII,S$GLB,, | Performed by: NURSE PRACTITIONER

## 2022-04-15 PROCEDURE — 1159F PR MEDICATION LIST DOCUMENTED IN MEDICAL RECORD: ICD-10-PCS | Mod: CPTII,S$GLB,, | Performed by: NURSE PRACTITIONER

## 2022-04-15 PROCEDURE — 3079F DIAST BP 80-89 MM HG: CPT | Mod: CPTII,S$GLB,, | Performed by: NURSE PRACTITIONER

## 2022-04-15 PROCEDURE — 1160F RVW MEDS BY RX/DR IN RCRD: CPT | Mod: CPTII,S$GLB,, | Performed by: NURSE PRACTITIONER

## 2022-04-15 RX ORDER — CETIRIZINE HYDROCHLORIDE 10 MG/1
10 TABLET ORAL DAILY
Qty: 30 TABLET | Refills: 0 | Status: ON HOLD | OUTPATIENT
Start: 2022-04-15 | End: 2023-02-28 | Stop reason: HOSPADM

## 2022-04-15 RX ORDER — FLUTICASONE PROPIONATE 50 MCG
1 SPRAY, SUSPENSION (ML) NASAL DAILY
Qty: 15.8 ML | Refills: 0 | Status: ON HOLD | OUTPATIENT
Start: 2022-04-15 | End: 2023-02-28 | Stop reason: HOSPADM

## 2022-04-15 RX ORDER — ONDANSETRON 4 MG/1
4 TABLET, ORALLY DISINTEGRATING ORAL EVERY 8 HOURS PRN
Qty: 20 TABLET | Refills: 0 | Status: ON HOLD | OUTPATIENT
Start: 2022-04-15 | End: 2023-02-28 | Stop reason: HOSPADM

## 2022-04-15 RX ORDER — MECLIZINE HYDROCHLORIDE 25 MG/1
25 TABLET ORAL 3 TIMES DAILY PRN
Qty: 20 TABLET | Refills: 0 | Status: SHIPPED | OUTPATIENT
Start: 2022-04-15

## 2022-04-15 NOTE — PATIENT INSTRUCTIONS
Symptomatic treatment to include:    Rest, increase fluid intake to thin mucus, include electrolyte replacement  Ibuprofen/Tylenol as directed for fever, sore throat, body aches  Zrytec daily  Flonase daily until bottle empty  guaifenesin twice daily to thin mucus  Nasal saline spray several times a day  or nasal wash systems  Warm, salt water gargles, over the counter throat lozenges or sprays for sore throat.

## 2022-04-15 NOTE — PROGRESS NOTES
"Subjective:       Patient ID: Elder Rosales is a 41 y.o. male.    Vitals:  height is 6' (1.829 m) and weight is 83.5 kg (184 lb). His oral temperature is 99.8 °F (37.7 °C). His blood pressure is 133/84 and his pulse is 76. His respiration is 20 and oxygen saturation is 97%.     Chief Complaint: Hypertension    Pt states "Having blood pressure problems; causing blurry vision/seeing stars and dizziness,  x's 3/4 days; pt admits to discontinuing BP medication about 4 months ago; pt admits to taking 2 Lisinopril tablets from a relative yesterday."    Patient reports recent history this week of approximately 4 days of mild nasal congestion, mild infrequent cough, left-sided chest heaviness that worsens with deep breath, intermittent spinning sensation that is associated with some nausea and visual disturbances, and also intermittent frontal sinus headache worsened with coughing.  Patient reports a history of hypertension and was taking lisinopril which he had stopped on his own, attributed the symptoms to concerns over of elevated blood pressure       Hypertension  Associated symptoms include blurred vision (Accompanied with dizziness and nausea) and headaches (Intermittent lasting is just less than 2nd when coughing to frontal sinus area). Pertinent negatives include no palpitations or shortness of breath.       Constitution: Negative for chills, fatigue and fever.   HENT: Positive for congestion (Mild, clear, thin). Negative for ear pain and sore throat.    Cardiovascular: Negative for chest trauma and palpitations.   Eyes: Positive for blurred vision (Accompanied with dizziness and nausea).   Respiratory: Positive for chest tightness (Chest heaviness left-sided worsen with with deep breath) and cough. Negative for shortness of breath.    Gastrointestinal: Positive for nausea (Associated with episodes of dizziness). Negative for diarrhea.   Skin: Negative for rash.   Neurological: Positive for dizziness and " headaches (Intermittent lasting is just less than 2nd when coughing to frontal sinus area). Negative for history of vertigo, disorientation and altered mental status.   Psychiatric/Behavioral: Positive for nervous/anxious and substance abuse. Negative for altered mental status and disorientation. The patient is nervous/anxious.        Objective:      Physical Exam   Constitutional: He is oriented to person, place, and time. He appears well-developed.  Non-toxic appearance. He does not appear ill. No distress.   HENT:   Head: Normocephalic and atraumatic.   Ears:   Right Ear: Tympanic membrane normal.   Left Ear: Tympanic membrane normal.   Nose: Nose normal.   Mouth/Throat: Oropharynx is clear and moist. Mucous membranes are moist. No oropharyngeal exudate or posterior oropharyngeal erythema.   Eyes: Conjunctivae and EOM are normal. Pupils are equal, round, and reactive to light. Right eye exhibits no discharge. Left eye exhibits no discharge.   Neck: Neck supple. No neck rigidity present.   Cardiovascular: Normal rate, regular rhythm and normal heart sounds.   Pulmonary/Chest: Effort normal and breath sounds normal. No respiratory distress.   Abdominal: Normal appearance.   Musculoskeletal:      Cervical back: He exhibits no tenderness.   Lymphadenopathy:     He has no cervical adenopathy.   Neurological: no focal deficit. He is alert and oriented to person, place, and time.   Skin: Skin is warm, dry and not diaphoretic. Capillary refill takes 2 to 3 seconds.   Psychiatric: His behavior is normal. Mood normal.   Nursing note and vitals reviewed.      EKG:  No STEMI, normal sinus rhythm without ectopy  Assessment:       1. Chest heaviness    2. Vertigo    3. Rhinosinusitis    4. Anxiety    5. History of hypertension    6. History of substance abuse          Plan:         Chest heaviness  -     EKG 12-lead; Future    Vertigo  -     Ambulatory referral/consult to Family Practice  -     ondansetron (ZOFRAN-ODT) 4 MG  TbDL; Take 1 tablet (4 mg total) by mouth every 8 (eight) hours as needed (nausea).  Dispense: 20 tablet; Refill: 0  -     meclizine (ANTIVERT) 25 mg tablet; Take 1 tablet (25 mg total) by mouth 3 (three) times daily as needed for Dizziness.  Dispense: 20 tablet; Refill: 0    Rhinosinusitis  -     fluticasone propionate (FLONASE) 50 mcg/actuation nasal spray; 1 spray (50 mcg total) by Each Nostril route once daily.  Dispense: 15.8 mL; Refill: 0  -     cetirizine (ZYRTEC) 10 MG tablet; Take 1 tablet (10 mg total) by mouth once daily.  Dispense: 30 tablet; Refill: 0    Anxiety  -     Ambulatory referral/consult to Family Practice    History of hypertension  -     Ambulatory referral/consult to Family Practice    History of substance abuse  -     Ambulatory referral/consult to Family Practice    Symptomatic treatment to include:    Rest, increase fluid intake to thin mucus, include electrolyte replacement  Ibuprofen/Tylenol as directed for fever, sore throat, body aches  Zrytec daily  Flonase daily until bottle empty  guaifenesin twice daily to thin mucus  Nasal saline spray several times a day  or nasal wash systems  Warm, salt water gargles, over the counter throat lozenges or sprays for sore throat.

## 2023-02-19 ENCOUNTER — HOSPITAL ENCOUNTER (INPATIENT)
Facility: HOSPITAL | Age: 43
LOS: 12 days | Discharge: PSYCHIATRIC OR SUBSTANCE USE FACILITY | DRG: 438 | End: 2023-03-03
Attending: EMERGENCY MEDICINE | Admitting: INTERNAL MEDICINE
Payer: MEDICAID

## 2023-02-19 DIAGNOSIS — K85.90 ACUTE PANCREATITIS, UNSPECIFIED COMPLICATION STATUS, UNSPECIFIED PANCREATITIS TYPE: Primary | ICD-10-CM

## 2023-02-19 DIAGNOSIS — R45.851 SUICIDAL IDEATION: ICD-10-CM

## 2023-02-19 DIAGNOSIS — K20.90 ESOPHAGITIS: ICD-10-CM

## 2023-02-19 DIAGNOSIS — K52.9 ENTEROCOLITIS: ICD-10-CM

## 2023-02-19 DIAGNOSIS — F11.221: ICD-10-CM

## 2023-02-19 PROBLEM — E87.6 HYPOKALEMIA: Status: ACTIVE | Noted: 2023-02-19

## 2023-02-19 PROBLEM — I10 HYPERTENSION, ESSENTIAL: Chronic | Status: ACTIVE | Noted: 2023-02-19

## 2023-02-19 PROBLEM — R79.0 LOW BLOOD MAGNESIUM: Status: ACTIVE | Noted: 2023-02-19

## 2023-02-19 PROBLEM — N17.9 AKI (ACUTE KIDNEY INJURY): Status: ACTIVE | Noted: 2023-02-19

## 2023-02-19 PROBLEM — E87.1 HYPONATREMIA: Status: ACTIVE | Noted: 2023-02-19

## 2023-02-19 LAB
ALBUMIN SERPL BCP-MCNC: 4 G/DL (ref 3.5–5.2)
ALP SERPL-CCNC: 71 U/L (ref 55–135)
ALT SERPL W/O P-5'-P-CCNC: 99 U/L (ref 10–44)
AMPHET+METHAMPHET UR QL: NEGATIVE
ANION GAP SERPL CALC-SCNC: 18 MMOL/L (ref 8–16)
ANION GAP SERPL CALC-SCNC: 24 MMOL/L (ref 8–16)
ANION GAP SERPL CALC-SCNC: 27 MMOL/L (ref 8–16)
AST SERPL-CCNC: 129 U/L (ref 10–40)
BACTERIA #/AREA URNS HPF: NEGATIVE /HPF
BARBITURATES UR QL SCN>200 NG/ML: NEGATIVE
BASOPHILS # BLD AUTO: 0.04 K/UL (ref 0–0.2)
BASOPHILS NFR BLD: 0.4 % (ref 0–1.9)
BENZODIAZ UR QL SCN>200 NG/ML: NEGATIVE
BILIRUB SERPL-MCNC: 2 MG/DL (ref 0.1–1)
BILIRUB UR QL STRIP: ABNORMAL
BNP SERPL-MCNC: 144 PG/ML (ref 0–99)
BUN SERPL-MCNC: 22 MG/DL (ref 6–20)
BUN SERPL-MCNC: 22 MG/DL (ref 6–20)
BUN SERPL-MCNC: 25 MG/DL (ref 6–20)
BZE UR QL SCN: NEGATIVE
CALCIUM SERPL-MCNC: 8.7 MG/DL (ref 8.7–10.5)
CALCIUM SERPL-MCNC: 8.8 MG/DL (ref 8.7–10.5)
CALCIUM SERPL-MCNC: 9.5 MG/DL (ref 8.7–10.5)
CANNABINOIDS UR QL SCN: NEGATIVE
CHLORIDE SERPL-SCNC: 73 MMOL/L (ref 95–110)
CHLORIDE SERPL-SCNC: 79 MMOL/L (ref 95–110)
CHLORIDE SERPL-SCNC: 82 MMOL/L (ref 95–110)
CHLORIDE UR-SCNC: <15 MMOL/L (ref 25–200)
CK SERPL-CCNC: 491 U/L (ref 20–200)
CLARITY UR: ABNORMAL
CO2 SERPL-SCNC: 20 MMOL/L (ref 23–29)
CO2 SERPL-SCNC: 21 MMOL/L (ref 23–29)
CO2 SERPL-SCNC: 24 MMOL/L (ref 23–29)
COLOR UR: YELLOW
CREAT SERPL-MCNC: 1.1 MG/DL (ref 0.5–1.4)
CREAT SERPL-MCNC: 1.2 MG/DL (ref 0.5–1.4)
CREAT SERPL-MCNC: 1.4 MG/DL (ref 0.5–1.4)
CREAT UR-MCNC: 67 MG/DL (ref 23–375)
DIFFERENTIAL METHOD: ABNORMAL
EOSINOPHIL # BLD AUTO: 0 K/UL (ref 0–0.5)
EOSINOPHIL NFR BLD: 0.4 % (ref 0–8)
ERYTHROCYTE [DISTWIDTH] IN BLOOD BY AUTOMATED COUNT: 15 % (ref 11.5–14.5)
EST. GFR  (NO RACE VARIABLE): >60 ML/MIN/1.73 M^2
ETHANOL SERPL-MCNC: <5 MG/DL
GLUCOSE SERPL-MCNC: 101 MG/DL (ref 70–110)
GLUCOSE SERPL-MCNC: 129 MG/DL (ref 70–110)
GLUCOSE SERPL-MCNC: 86 MG/DL (ref 70–110)
GLUCOSE UR QL STRIP: NEGATIVE
HCT VFR BLD AUTO: 41.1 % (ref 40–54)
HGB BLD-MCNC: 15.3 G/DL (ref 14–18)
HGB UR QL STRIP: ABNORMAL
HYALINE CASTS #/AREA URNS LPF: 26 /LPF
IMM GRANULOCYTES # BLD AUTO: 0.1 K/UL (ref 0–0.04)
IMM GRANULOCYTES NFR BLD AUTO: 1.1 % (ref 0–0.5)
KETONES UR QL STRIP: ABNORMAL
LEUKOCYTE ESTERASE UR QL STRIP: NEGATIVE
LIPASE SERPL-CCNC: 569 U/L (ref 4–60)
LYMPHOCYTES # BLD AUTO: 0.7 K/UL (ref 1–4.8)
LYMPHOCYTES NFR BLD: 7.5 % (ref 18–48)
MAGNESIUM SERPL-MCNC: 1.7 MG/DL (ref 1.6–2.6)
MAGNESIUM SERPL-MCNC: 1.8 MG/DL (ref 1.6–2.6)
MCH RBC QN AUTO: 30.4 PG (ref 27–31)
MCHC RBC AUTO-ENTMCNC: 37.2 G/DL (ref 32–36)
MCV RBC AUTO: 82 FL (ref 82–98)
MICROSCOPIC COMMENT: ABNORMAL
MONOCYTES # BLD AUTO: 1.3 K/UL (ref 0.3–1)
MONOCYTES NFR BLD: 14.4 % (ref 4–15)
NEUTROPHILS # BLD AUTO: 6.9 K/UL (ref 1.8–7.7)
NEUTROPHILS NFR BLD: 76.2 % (ref 38–73)
NITRITE UR QL STRIP: NEGATIVE
NRBC BLD-RTO: 1 /100 WBC
OPIATES UR QL SCN: NEGATIVE
OSMOLALITY SERPL: 267 MOSM/KG (ref 280–300)
OSMOLALITY UR: 418 MOSM/KG (ref 50–1200)
PCP UR QL SCN>25 NG/ML: NEGATIVE
PH UR STRIP: 7 [PH] (ref 5–8)
PLATELET # BLD AUTO: 144 K/UL (ref 150–450)
PMV BLD AUTO: 12.9 FL (ref 9.2–12.9)
POTASSIUM SERPL-SCNC: 2.6 MMOL/L (ref 3.5–5.1)
POTASSIUM UR-SCNC: 23 MMOL/L (ref 15–95)
PROT SERPL-MCNC: 7.4 G/DL (ref 6–8.4)
PROT UR QL STRIP: ABNORMAL
RBC # BLD AUTO: 5.03 M/UL (ref 4.6–6.2)
RBC #/AREA URNS HPF: 19 /HPF (ref 0–4)
SARS-COV-2 RDRP RESP QL NAA+PROBE: NEGATIVE
SODIUM SERPL-SCNC: 120 MMOL/L (ref 136–145)
SODIUM SERPL-SCNC: 123 MMOL/L (ref 136–145)
SODIUM SERPL-SCNC: 124 MMOL/L (ref 136–145)
SODIUM SERPL-SCNC: 124 MMOL/L (ref 136–145)
SODIUM UR-SCNC: <10 MMOL/L (ref 20–250)
SP GR UR STRIP: 1.03 (ref 1–1.03)
SQUAMOUS #/AREA URNS HPF: 14 /HPF
TOXICOLOGY INFORMATION: NORMAL
TROPONIN I SERPL HS-MCNC: 16.9 PG/ML (ref 0–14.9)
TROPONIN I SERPL HS-MCNC: 18.6 PG/ML (ref 0–14.9)
URN SPEC COLLECT METH UR: ABNORMAL
UROBILINOGEN UR STRIP-ACNC: ABNORMAL EU/DL
WBC # BLD AUTO: 9.08 K/UL (ref 3.9–12.7)
WBC #/AREA URNS HPF: 35 /HPF (ref 0–5)

## 2023-02-19 PROCEDURE — 82550 ASSAY OF CK (CPK): CPT | Performed by: EMERGENCY MEDICINE

## 2023-02-19 PROCEDURE — 36415 COLL VENOUS BLD VENIPUNCTURE: CPT | Performed by: EMERGENCY MEDICINE

## 2023-02-19 PROCEDURE — 82436 ASSAY OF URINE CHLORIDE: CPT | Performed by: EMERGENCY MEDICINE

## 2023-02-19 PROCEDURE — 83930 ASSAY OF BLOOD OSMOLALITY: CPT | Performed by: EMERGENCY MEDICINE

## 2023-02-19 PROCEDURE — 83735 ASSAY OF MAGNESIUM: CPT | Mod: 91 | Performed by: INTERNAL MEDICINE

## 2023-02-19 PROCEDURE — 25500020 PHARM REV CODE 255: Performed by: EMERGENCY MEDICINE

## 2023-02-19 PROCEDURE — 85025 COMPLETE CBC W/AUTO DIFF WBC: CPT | Performed by: EMERGENCY MEDICINE

## 2023-02-19 PROCEDURE — 82077 ASSAY SPEC XCP UR&BREATH IA: CPT | Performed by: EMERGENCY MEDICINE

## 2023-02-19 PROCEDURE — 25000003 PHARM REV CODE 250: Performed by: EMERGENCY MEDICINE

## 2023-02-19 PROCEDURE — 93010 EKG 12-LEAD: ICD-10-PCS | Mod: ,,, | Performed by: INTERNAL MEDICINE

## 2023-02-19 PROCEDURE — 83935 ASSAY OF URINE OSMOLALITY: CPT | Performed by: EMERGENCY MEDICINE

## 2023-02-19 PROCEDURE — 96376 TX/PRO/DX INJ SAME DRUG ADON: CPT

## 2023-02-19 PROCEDURE — 80307 DRUG TEST PRSMV CHEM ANLYZR: CPT | Performed by: EMERGENCY MEDICINE

## 2023-02-19 PROCEDURE — 99204 OFFICE O/P NEW MOD 45 MIN: CPT | Mod: 95,,, | Performed by: PSYCHIATRY & NEUROLOGY

## 2023-02-19 PROCEDURE — 96365 THER/PROPH/DIAG IV INF INIT: CPT

## 2023-02-19 PROCEDURE — 83735 ASSAY OF MAGNESIUM: CPT | Performed by: EMERGENCY MEDICINE

## 2023-02-19 PROCEDURE — U0002 COVID-19 LAB TEST NON-CDC: HCPCS | Performed by: EMERGENCY MEDICINE

## 2023-02-19 PROCEDURE — C9113 INJ PANTOPRAZOLE SODIUM, VIA: HCPCS | Performed by: EMERGENCY MEDICINE

## 2023-02-19 PROCEDURE — 84133 ASSAY OF URINE POTASSIUM: CPT | Performed by: EMERGENCY MEDICINE

## 2023-02-19 PROCEDURE — 84484 ASSAY OF TROPONIN QUANT: CPT | Performed by: EMERGENCY MEDICINE

## 2023-02-19 PROCEDURE — 80053 COMPREHEN METABOLIC PANEL: CPT | Performed by: EMERGENCY MEDICINE

## 2023-02-19 PROCEDURE — 80048 BASIC METABOLIC PNL TOTAL CA: CPT | Performed by: INTERNAL MEDICINE

## 2023-02-19 PROCEDURE — 99285 EMERGENCY DEPT VISIT HI MDM: CPT | Mod: 25

## 2023-02-19 PROCEDURE — 96375 TX/PRO/DX INJ NEW DRUG ADDON: CPT

## 2023-02-19 PROCEDURE — 87086 URINE CULTURE/COLONY COUNT: CPT | Performed by: EMERGENCY MEDICINE

## 2023-02-19 PROCEDURE — 93010 ELECTROCARDIOGRAM REPORT: CPT | Mod: ,,, | Performed by: INTERNAL MEDICINE

## 2023-02-19 PROCEDURE — 84295 ASSAY OF SERUM SODIUM: CPT | Performed by: EMERGENCY MEDICINE

## 2023-02-19 PROCEDURE — 81001 URINALYSIS AUTO W/SCOPE: CPT | Performed by: EMERGENCY MEDICINE

## 2023-02-19 PROCEDURE — 93005 ELECTROCARDIOGRAM TRACING: CPT | Performed by: INTERNAL MEDICINE

## 2023-02-19 PROCEDURE — 20000000 HC ICU ROOM

## 2023-02-19 PROCEDURE — 83880 ASSAY OF NATRIURETIC PEPTIDE: CPT | Performed by: EMERGENCY MEDICINE

## 2023-02-19 PROCEDURE — 99204 PR OFFICE/OUTPT VISIT, NEW, LEVL IV, 45-59 MIN: ICD-10-PCS | Mod: 95,,, | Performed by: PSYCHIATRY & NEUROLOGY

## 2023-02-19 PROCEDURE — 83690 ASSAY OF LIPASE: CPT | Performed by: EMERGENCY MEDICINE

## 2023-02-19 PROCEDURE — 96361 HYDRATE IV INFUSION ADD-ON: CPT

## 2023-02-19 PROCEDURE — 25000003 PHARM REV CODE 250: Performed by: NURSE PRACTITIONER

## 2023-02-19 PROCEDURE — 63600175 PHARM REV CODE 636 W HCPCS: Performed by: EMERGENCY MEDICINE

## 2023-02-19 PROCEDURE — 84300 ASSAY OF URINE SODIUM: CPT | Performed by: EMERGENCY MEDICINE

## 2023-02-19 PROCEDURE — 25000003 PHARM REV CODE 250: Performed by: INTERNAL MEDICINE

## 2023-02-19 RX ORDER — BUPROPION HYDROCHLORIDE 150 MG/1
150 TABLET ORAL EVERY MORNING
Status: ON HOLD | COMMUNITY
Start: 2023-01-19 | End: 2023-02-28 | Stop reason: HOSPADM

## 2023-02-19 RX ORDER — ONDANSETRON 2 MG/ML
4 INJECTION INTRAMUSCULAR; INTRAVENOUS
Status: ACTIVE | OUTPATIENT
Start: 2023-02-19 | End: 2023-02-20

## 2023-02-19 RX ORDER — ONDANSETRON 2 MG/ML
4 INJECTION INTRAMUSCULAR; INTRAVENOUS
Status: COMPLETED | OUTPATIENT
Start: 2023-02-19 | End: 2023-02-19

## 2023-02-19 RX ORDER — LOPERAMIDE HYDROCHLORIDE 2 MG/1
4 CAPSULE ORAL
Status: DISPENSED | OUTPATIENT
Start: 2023-02-19 | End: 2023-02-20

## 2023-02-19 RX ORDER — DULOXETIN HYDROCHLORIDE 30 MG/1
CAPSULE, DELAYED RELEASE ORAL
Status: ON HOLD | COMMUNITY
End: 2023-02-28 | Stop reason: HOSPADM

## 2023-02-19 RX ORDER — POTASSIUM CHLORIDE 7.45 MG/ML
40 INJECTION INTRAVENOUS
Status: DISCONTINUED | OUTPATIENT
Start: 2023-02-20 | End: 2023-03-04 | Stop reason: HOSPADM

## 2023-02-19 RX ORDER — BUPRENORPHINE HYDROCHLORIDE AND NALOXONE HYDROCHLORIDE DIHYDRATE 8; 2 MG/1; MG/1
TABLET SUBLINGUAL
Status: ON HOLD | COMMUNITY
End: 2023-02-28 | Stop reason: HOSPADM

## 2023-02-19 RX ORDER — QUETIAPINE FUMARATE 100 MG/1
100 TABLET, FILM COATED ORAL NIGHTLY
COMMUNITY
Start: 2022-12-05

## 2023-02-19 RX ORDER — LISINOPRIL 5 MG/1
TABLET ORAL
Status: ON HOLD | COMMUNITY
End: 2023-02-28 | Stop reason: HOSPADM

## 2023-02-19 RX ORDER — CALCIUM GLUCONATE 20 MG/ML
2 INJECTION, SOLUTION INTRAVENOUS
Status: DISCONTINUED | OUTPATIENT
Start: 2023-02-20 | End: 2023-03-04 | Stop reason: HOSPADM

## 2023-02-19 RX ORDER — CALCIUM GLUCONATE 20 MG/ML
3 INJECTION, SOLUTION INTRAVENOUS
Status: DISCONTINUED | OUTPATIENT
Start: 2023-02-20 | End: 2023-03-04 | Stop reason: HOSPADM

## 2023-02-19 RX ORDER — SODIUM CHLORIDE 0.9 % (FLUSH) 0.9 %
10 SYRINGE (ML) INJECTION
Status: DISCONTINUED | OUTPATIENT
Start: 2023-02-19 | End: 2023-03-04 | Stop reason: HOSPADM

## 2023-02-19 RX ORDER — PANTOPRAZOLE SODIUM 40 MG/10ML
40 INJECTION, POWDER, LYOPHILIZED, FOR SOLUTION INTRAVENOUS
Status: COMPLETED | OUTPATIENT
Start: 2023-02-19 | End: 2023-02-19

## 2023-02-19 RX ORDER — HYDROMORPHONE HYDROCHLORIDE 1 MG/ML
0.5 INJECTION, SOLUTION INTRAMUSCULAR; INTRAVENOUS; SUBCUTANEOUS
Status: COMPLETED | OUTPATIENT
Start: 2023-02-19 | End: 2023-02-19

## 2023-02-19 RX ORDER — POTASSIUM CHLORIDE 7.45 MG/ML
20 INJECTION INTRAVENOUS ONCE
Status: COMPLETED | OUTPATIENT
Start: 2023-02-19 | End: 2023-02-20

## 2023-02-19 RX ORDER — MAGNESIUM SULFATE HEPTAHYDRATE 40 MG/ML
4 INJECTION, SOLUTION INTRAVENOUS
Status: DISCONTINUED | OUTPATIENT
Start: 2023-02-20 | End: 2023-03-04 | Stop reason: HOSPADM

## 2023-02-19 RX ORDER — CARVEDILOL 3.12 MG/1
3.12 TABLET ORAL 2 TIMES DAILY
Status: ON HOLD | COMMUNITY
Start: 2023-01-19 | End: 2023-02-28 | Stop reason: HOSPADM

## 2023-02-19 RX ORDER — QUETIAPINE FUMARATE 50 MG/1
TABLET, FILM COATED ORAL
Status: ON HOLD | COMMUNITY
End: 2023-02-28 | Stop reason: HOSPADM

## 2023-02-19 RX ORDER — SODIUM CHLORIDE 9 MG/ML
1000 INJECTION, SOLUTION INTRAVENOUS
Status: COMPLETED | OUTPATIENT
Start: 2023-02-19 | End: 2023-02-20

## 2023-02-19 RX ORDER — TALC
6 POWDER (GRAM) TOPICAL NIGHTLY PRN
Status: DISCONTINUED | OUTPATIENT
Start: 2023-02-19 | End: 2023-03-04 | Stop reason: HOSPADM

## 2023-02-19 RX ORDER — CALCIUM GLUCONATE 20 MG/ML
1 INJECTION, SOLUTION INTRAVENOUS
Status: DISCONTINUED | OUTPATIENT
Start: 2023-02-20 | End: 2023-03-04 | Stop reason: HOSPADM

## 2023-02-19 RX ORDER — MAGNESIUM SULFATE HEPTAHYDRATE 40 MG/ML
2 INJECTION, SOLUTION INTRAVENOUS
Status: DISCONTINUED | OUTPATIENT
Start: 2023-02-20 | End: 2023-03-04 | Stop reason: HOSPADM

## 2023-02-19 RX ORDER — BUSPIRONE HYDROCHLORIDE 10 MG/1
TABLET ORAL
Status: ON HOLD | COMMUNITY
End: 2023-02-28 | Stop reason: HOSPADM

## 2023-02-19 RX ORDER — CLONAZEPAM 1 MG/1
1 TABLET ORAL DAILY PRN
COMMUNITY
Start: 2022-12-08

## 2023-02-19 RX ORDER — SODIUM CHLORIDE 9 MG/ML
INJECTION, SOLUTION INTRAVENOUS CONTINUOUS
Status: DISCONTINUED | OUTPATIENT
Start: 2023-02-19 | End: 2023-02-21

## 2023-02-19 RX ORDER — AMLODIPINE BESYLATE 5 MG/1
5 TABLET ORAL DAILY
Status: ON HOLD | COMMUNITY
Start: 2022-12-05 | End: 2023-02-28 | Stop reason: HOSPADM

## 2023-02-19 RX ORDER — GABAPENTIN 800 MG/1
TABLET ORAL
COMMUNITY

## 2023-02-19 RX ORDER — TRAMADOL HYDROCHLORIDE 50 MG/1
50 TABLET ORAL EVERY 6 HOURS PRN
Status: DISCONTINUED | OUTPATIENT
Start: 2023-02-19 | End: 2023-03-03

## 2023-02-19 RX ORDER — LISINOPRIL 10 MG/1
TABLET ORAL
Status: ON HOLD | COMMUNITY
End: 2023-02-28 | Stop reason: HOSPADM

## 2023-02-19 RX ADMIN — ONDANSETRON 4 MG: 2 INJECTION INTRAMUSCULAR; INTRAVENOUS at 02:02

## 2023-02-19 RX ADMIN — SODIUM CHLORIDE 1000 ML: 0.9 INJECTION, SOLUTION INTRAVENOUS at 03:02

## 2023-02-19 RX ADMIN — Medication 6 MG: at 10:02

## 2023-02-19 RX ADMIN — IOHEXOL 100 ML: 350 INJECTION, SOLUTION INTRAVENOUS at 02:02

## 2023-02-19 RX ADMIN — HYDROMORPHONE HYDROCHLORIDE 0.5 MG: 0.5 INJECTION, SOLUTION INTRAMUSCULAR; INTRAVENOUS; SUBCUTANEOUS at 03:02

## 2023-02-19 RX ADMIN — POTASSIUM CHLORIDE 20 MEQ: 7.46 INJECTION, SOLUTION INTRAVENOUS at 06:02

## 2023-02-19 RX ADMIN — ONDANSETRON 4 MG: 2 INJECTION INTRAMUSCULAR; INTRAVENOUS at 10:02

## 2023-02-19 RX ADMIN — SODIUM CHLORIDE: 0.9 INJECTION, SOLUTION INTRAVENOUS at 05:02

## 2023-02-19 RX ADMIN — PANTOPRAZOLE SODIUM 40 MG: 40 INJECTION, POWDER, FOR SOLUTION INTRAVENOUS at 03:02

## 2023-02-19 RX ADMIN — TRAMADOL HYDROCHLORIDE 50 MG: 50 TABLET, COATED ORAL at 10:02

## 2023-02-19 RX ADMIN — SODIUM CHLORIDE, SODIUM LACTATE, POTASSIUM CHLORIDE, AND CALCIUM CHLORIDE 1000 ML: .6; .31; .03; .02 INJECTION, SOLUTION INTRAVENOUS at 10:02

## 2023-02-19 NOTE — ED NOTES
Assumed care of pt, pt resting quietly in bed, eyes open, chest rising and falling, respirations E/U, NADN. Restroom, comfort and positioning needs addressed. Bed in lowest and locked position, SR ^ X 2, call light in reach. Sitter maintaining visual contact.

## 2023-02-19 NOTE — ED NOTES
Rounded on pt, pt updated on the POC. Comfort, positioning, and restroom needs addressed. The pt is resting queitly in bed, chest rising and falling, respirations are even and unlabored, NADN. Bed in lowest and locked position, SR ^ X 2, call light in reach. Sitter maintaining visual contact. Pt encouraged to call nurses station for any needs. Pt verbalizes understanding.

## 2023-02-19 NOTE — HPI
"Patient is a 41 yo cauc male with hx ETOH, >20y/pack/d Reformed smoker, vertigo, ADHD, IVDU, Polysubstance abused,treated Hep c . He reports going camping and fishing 3 days ago and experienced vertigo, abdominal pain, N/V, falling and imbalance, hearing voices in his head and talking to himself. He report dark black stools for 4 days however hbg is stable. He states, " he stop use to smoke a pack/d, and drink 12-15 beers a day but stopped a month ago". While in the ED reported suicidal thoughts and depression and asked to speak with a psychiatrist. He was evaluated by telepsych. On assessment patient was fidgeting, difficulty focusing, not staying on track with questions asked and gaurded to left hip and back. He denied drug use however past documentation states iv drug user,and  polysubstance abuse. He denies chest pain, dysuria, fever, and chills.     Hospitalist asked to admit for hyponatermia , pancreatitis, enterocolitis, abd ct abnormality. Nephrology, GI consulted in ED.  "

## 2023-02-19 NOTE — ED PROVIDER NOTES
Encounter Date: 2/19/2023       History     Chief Complaint   Patient presents with    Hip Pain     LEFT, CAMPING IN WOODS X COUPLE DAYS, STATES FELL COUPLE TIMES    Vomiting    Dizziness     X 2-3 DAYS     42-year-old male with history of anxiety, previous heavy tobacco and alcohol use.  Patient states last alcohol use was proximally 2 months ago.  Used to smoke a pack a day also drank a case of beer daily for several years.  Patient presents emergency department with complaint of generalized upper lower extremity cramping abdominal pain, dizziness weakness with nausea which has been noted over last 3 days.  Patient states that he was camping in the woods and decided that he could not make it out of the woods due to feeling so weak.  Patient states that he was stumbling and had to wait in order to walk out of the roads.  He feels like his vertigo was acting up.  He denies fever, no headache, did endorse dizziness.  Patient decided to come to ER due to persistent symptoms.    Review of patient's allergies indicates:  No Known Allergies  Past Medical History:   Diagnosis Date    Anxiety     Carpal tunnel syndrome     Hepatitis C virus infection cured after antiviral drug therapy     s/p epclusa, svr12 - 3/2021 (treated / cured)    Shoulder disorder      No past surgical history on file.  Family History   Problem Relation Age of Onset    No Known Problems Mother     No Known Problems Father      Social History     Tobacco Use    Smoking status: Every Day     Packs/day: 0.50     Types: Cigarettes    Smokeless tobacco: Never   Substance Use Topics    Alcohol use: Yes     Comment: bourbon    Drug use: Not Currently     Types: Marijuana, Cocaine, Anabolic steroids     Review of Systems   Constitutional:  Positive for fatigue. Negative for fever.   HENT:  Negative for sore throat.    Respiratory:  Negative for shortness of breath.    Cardiovascular:  Negative for chest pain.   Gastrointestinal:  Positive for abdominal pain  and nausea. Negative for vomiting.   Genitourinary:  Negative for dysuria.   Musculoskeletal:  Negative for back pain.   Skin:  Negative for rash.   Neurological:  Positive for dizziness, tremors, weakness and light-headedness. Negative for seizures, syncope and speech difficulty.   Hematological:  Does not bruise/bleed easily.     Physical Exam     Initial Vitals [02/19/23 0923]   BP Pulse Resp Temp SpO2   100/85 (!) 118 18 98.6 °F (37 °C) 99 %      MAP       --         Physical Exam    Nursing note and vitals reviewed.  Constitutional: He appears well-developed and well-nourished. No distress.   HENT:   Head: Normocephalic and atraumatic.   Nose: Nose normal.   Mouth/Throat: Oropharynx is clear and moist.   Eyes: Conjunctivae and EOM are normal. Pupils are equal, round, and reactive to light. No scleral icterus.   Neck: Neck supple.   Normal range of motion.  Cardiovascular:  Normal rate, regular rhythm, normal heart sounds and intact distal pulses.     Exam reveals no gallop and no friction rub.       No murmur heard.  Pulmonary/Chest: No stridor. No respiratory distress.   Course BBS    Abdominal: Abdomen is soft. Bowel sounds are normal. He exhibits no mass. There is no abdominal tenderness. There is no rebound and no guarding.   Musculoskeletal:         General: No edema. Normal range of motion.      Cervical back: Normal range of motion and neck supple.     Lymphadenopathy:     He has no cervical adenopathy.   Neurological: He is alert and oriented to person, place, and time. He has normal strength and normal reflexes. He displays normal reflexes. No cranial nerve deficit or sensory deficit. GCS score is 15. GCS eye subscore is 4. GCS verbal subscore is 5. GCS motor subscore is 6.   Skin: Skin is warm and dry. Capillary refill takes less than 2 seconds. No rash noted.   Psychiatric: He has a normal mood and affect. His behavior is normal. Judgment and thought content normal.       ED Course   Procedures  Labs  Reviewed   CBC W/ AUTO DIFFERENTIAL - Abnormal; Notable for the following components:       Result Value    MCHC 37.2 (*)     RDW 15.0 (*)     Platelets 144 (*)     Immature Granulocytes 1.1 (*)     Immature Grans (Abs) 0.10 (*)     Lymph # 0.7 (*)     Mono # 1.3 (*)     nRBC 1 (*)     Gran % 76.2 (*)     Lymph % 7.5 (*)     All other components within normal limits   COMPREHENSIVE METABOLIC PANEL - Abnormal; Notable for the following components:    Sodium 120 (*)     Potassium 2.6 (*)     Chloride 73 (*)     CO2 20 (*)     Glucose 129 (*)     BUN 25 (*)     Total Bilirubin 2.0 (*)      (*)     ALT 99 (*)     Anion Gap 27 (*)     All other components within normal limits    Narrative:     Sodium, potassium, Chloride critical result(s) repeated. Called and   verbal readback obtained from Sushant Veras ED, RN by SLT1 02/19/2023   11:16   TROPONIN I HIGH SENSITIVITY - Abnormal; Notable for the following components:    Troponin I High Sensitivity 18.6 (*)     All other components within normal limits   B-TYPE NATRIURETIC PEPTIDE - Abnormal; Notable for the following components:     (*)     All other components within normal limits   CK - Abnormal; Notable for the following components:     (*)     All other components within normal limits   LIPASE - Abnormal; Notable for the following components:    Lipase 569 (*)     All other components within normal limits   TROPONIN I HIGH SENSITIVITY - Abnormal; Notable for the following components:    Troponin I High Sensitivity 16.9 (*)     All other components within normal limits   SODIUM - Abnormal; Notable for the following components:    Sodium 123 (*)     All other components within normal limits    Narrative:     Sodium critical result(s) repeated. Called and verbal readback   obtained from Chelle Zhang ED, RN by SLT1 02/19/2023 15:21   MAGNESIUM   SARS-COV-2 RNA AMPLIFICATION, QUAL   ALCOHOL,MEDICAL (ETHANOL)   ALCOHOL,MEDICAL (ETHANOL)   SODIUM    DRUG SCREEN PANEL, URINE EMERGENCY   URINALYSIS, REFLEX TO URINE CULTURE   OSMOLALITY, URINE RANDOM   SODIUM, URINE, RANDOM   CHLORIDE, URINE, RANDOM   POTASSIUM, URINE, RANDOM   OSMOLALITY, SERUM   URINALYSIS MICROSCOPIC        ECG Results              EKG 12-lead (In process)  Result time 02/19/23 10:34:15      In process by Interface, Lab In [facility] (02/19/23 10:34:15)                   Narrative:    Test Reason : R07.9,    Vent. Rate : 104 BPM     Atrial Rate : 104 BPM     P-R Int : 188 ms          QRS Dur : 096 ms      QT Int : 368 ms       P-R-T Axes : 054 059 -25 degrees     QTc Int : 483 ms    Sinus tachycardia  ST and T wave abnormality, consider inferior ischemia  ST and T wave abnormality, consider anterolateral ischemia  Abnormal ECG  When compared with ECG of 06-NOV-2021 19:28,  AZ interval has decreased  QRS duration has decreased  T wave inversion now evident in Anterior-lateral leads    Referred By: [email or web address]   SELF           Confirmed By:                                   Imaging Results              CT Abdomen Pelvis With Contrast (Final result)  Result time 02/19/23 14:46:17      Final result by Naren Mendez MD (02/19/23 14:46:17)                   Narrative:    CT ABDOMEN AND PELVIS WITH CONTRAST    HISTORY: Abdominal pain    CMS MANDATED QUALITY DATA - CT RADIATION  436    All CT scans at this facility utilize dose modulation, iterative reconstruction, and/or weight based dosing when appropriate to reduce radiation dose to as low as reasonably achievable    FINDINGS:    Post infusion images were obtained from the lung bases to the pubic symphysis. 100 cc nonionic contrast was administered for the examination.    The lung bases are unremarkable. Focal pneumomediastinum is noted, with multiple air bubbles noted posterior to the esophagus. There is mild circumferential wall thickening of the esophagus distally which could be on the basis of underdistention or esophagitis.    There is marked  hepatic steatosis. The gallbladder is mildly dilated. Gallbladder lumen is hyperdense in appearance, likely on the basis of hyperdense bile. Biliary tree is nondilated. The spleen has a normal appearance. There is subtle fat stranding adjacent to the pancreatic tail. Correlate with amylase and lipase levels. No pancreatic mass is identified. The adrenal glands are normal. The bilateral kidneys are unremarkable. The abdominal aorta is normal in caliber.    The colon appears slightly thick-walled throughout its course, however this may be related to incomplete distention. Slightly thick-walled appearance of ileal loops is also noted. No free air or free fluid is identified.    Images of the pelvis demonstrate a normal urinary bladder. There is no free fluid or lymphadenopathy. No acute osseous abnormalities are identified.    IMPRESSION:      1. Subtle fat stranding adjacent to the pancreatic tail suspicious for early or mild pancreatitis. Correlate with amylase and lipase levels.  2. Focal pneumomediastinum, with multiple air bubbles noted dorsal to the distal esophagus. The esophagus is slightly thick-walled which could be on the basis of esophagitis or incomplete distention. While esophageal perforation is felt unlikely, if this is a strong clinical concern, CT chest following water-soluble oral contrast administration could be performed.  3. Severe hepatic steatosis. Hyperdense appearance of the gallbladder is likely on the basis of hyperdense bile.  4. Slightly thick-walled appearance of the colon and of the ileum. Correlate clinically for enterocolitis. Liver disease/hypoalbuminemia could also give this same appearance, and wall thickening of the colon may be in part related to incomplete distention.  5. Additional findings as above.    Electronically signed by:  Naren Mendez MD  2/19/2023 2:46 PM [facility] Workstation: [id number]                                     CT Head Without Contrast (Final result)   Result time 02/19/23 14:31:41      Final result by Naren Mendez MD (02/19/23 14:31:41)                   Narrative:    CT HEAD WITHOUT CONTRAST    CMS MANDATED QUALITY DATA - CT RADIATION  436    All CT scans at this facility utilize dose modulation, iterative reconstruction, and/or weight based dosing when appropriate to reduce radiation dose to as low as reasonably achievable    Clinical data: Dizziness. Comparison to December 2018.    FINDINGS: Noninfusion images were obtained from the skull base to the vertex. There is no intracranial mass, hemorrhage, or midline shift. Ventricles and sulci are normal. There are no pathologic extra-axial fluid collections. There is no evidence of ischemic change or edema. Cerebellum and brainstem are normal.    The calvarium is intact.Mild mucoperiosteal thickening in the left maxillary sinus is consistent with chronic sinusitis, improved compared to the prior study.    IMPRESSION:    1. Normal CT appearance of the brain.  2. Mild chronic left maxillary sinusitis, improved compared to 2018.    Electronically signed by:  Naren Mendez MD  2/19/2023 2:31 PM [facility] Workstation: [id number]                                     X-Ray Hip 2 or 3 views Left (with Pelvis when performed) (Final result)  Result time 02/19/23 10:42:16      Final result by Naren Mendez MD (02/19/23 10:42:16)                   Narrative:    Left hip with AP pelvis    CLINICAL DATA: Trauma, pain    FINDINGS: 3 views are negative for fracture, dislocation, or osseous destructive lesion. No significant arthritic change is identified. Soft tissues are unremarkable.    IMPRESSION:  1. No radiographic abnormalities.    Electronically signed by:  Naren Mendez MD  2/19/2023 10:42 AM C[facility] Workstation: [id number]                                     X-Ray Chest AP Portable (Final result)  Result time 02/19/23 10:41:34      Final result by Naren Mendez MD (02/19/23 10:41:34)                    Narrative:    Chest single view    Clinical data:Chest pain. Comparison to January 2022.    FINDINGS: AP view of the chest demonstrates no cardiac, pulmonary, or osseous abnormalities.    IMPRESSION:  1. Normal chest single view.    Electronically signed by:  Naren Mendez MD  2/19/2023 10:41 AM CST Workstation: [id number]                                     Medications   ondansetron injection 4 mg (0 mg Intravenous Hold 2/19/23 1500)   lactated ringers bolus 1,000 mL (0 mLs Intravenous Stopped 2/19/23 1120)   ondansetron injection 4 mg (4 mg Intravenous Given 2/19/23 1000)   ondansetron injection 4 mg (4 mg Intravenous Given 2/19/23 1446)   iohexoL (OMNIPAQUE 350) injection 100 mL (100 mLs Intravenous Given 2/19/23 1428)   pantoprazole injection 40 mg (40 mg Intravenous Given 2/19/23 1510)   0.9%  NaCl infusion (1,000 mLs Intravenous New Bag 2/19/23 1510)   HYDROmorphone injection 0.5 mg (0.5 mg Intravenous Given 2/19/23 1510)     Medical Decision Making:   Initial Assessment:   42-year-old male with history of anxiety, previous heavy tobacco and alcohol use.  Patient states last alcohol use was proximally 2 months ago.  Used to smoke a pack a day also drank a case of beer daily for several years.  Patient presents emergency department with complaint of generalized upper lower extremity cramping abdominal pain, dizziness weakness with nausea which has been noted over last 3 days.  Patient states that he was camping in the woods and decided that he could not make it out of the woods due to feeling so weak.  Patient states that he was stumbling and had to wait in order to walk out of the roads.  He feels like his vertigo was acting up.  He denies fever, no headache, did endorse dizziness.  Patient decided to come to ER due to persistent symptoms.    Differential Diagnosis:   Symptomatic hyponatremia, rhabdomyolysis, alcohol intoxication, viral syndrome, esophagitis, pancreatitis, colitis  Clinical Tests:   Lab  Tests: Ordered and Reviewed  Radiological Study: Ordered and Reviewed  Medical Tests: Ordered and Reviewed          Attending Attestation:         Attending Critical Care:   Critical Care Times:   Direct Patient Care (initial evaluation, reassessments, and time considering the case)................................................................30 minutes.   Additional History from reviewing old medical records or taking additional history from the family, EMS, PCP, etc.......................10 minutes.   Ordering, Reviewing, and Interpreting Diagnostic Studies...............................................................................................................10 minutes.   Documentation..................................................................................................................................................................................10 minutes.   Consultation with other Physicians. .................................................................................................................................................10 minutes.   Consultation with the patient's family directly relating to the patient's condition, care, and DNR status (when patient unable)......5 minutes.   ==============================================================  Total Critical Care Time - exclusive of procedural time: 75 minutes.  ==============================================================  Critical care was necessary to treat or prevent imminent or life-threatening deterioration of the following conditions: metabolic crisis (Severe hyponatremia, pancreatitis, acute kidney injury).   Critical care was time spent personally by me on the following activities: obtaining history from patient or relative, examination of patient, review of x-rays / CT sent with the patient, review of old charts, ordering lab, x-rays, and/or EKG, development of treatment plan with patient or relative, ordering and  performing treatments and interventions, evaluation of patient's response to treatment, discussion with consultants and re-evaluation of patient's conition.   Critical Care Condition: potentially life-threatening         ED Course as of 02/19/23 1555   Sun Feb 19, 2023   1549 Patient seen evaluated emergency department.  Currently at this time patient found with persistent hyponatremia with sodium of 120, chloride 73, potassium 2.6 with bicarb 20 glucose 129, BUN 25, creatinine 1.4.  T bili 2.0.  Patient also with lipase of 569.  CPK of 491.  Patient on CT abdomen and pelvis was found to have findings consistent with pancreatitis also with esophagitis.  Patient also with enterocolitis as well.  While patient was ongoing his workup patient had stated that he needed to see Psychiatry secondary to hallucinations and feelings of depression and suicidal ideation.  Currently at this time patient will be treated for hyponatremia.  Patient's case discussed with Nephrology.  Will be placed on normal saline at 130 cc an hour.  Patient also given Zosyn in emergency department.  Patient placed on Protonix Protonix infusion.  Patient was PEC'd due to suicidal ideations.  Remained hemodynamically adequate.  Currently awaiting admission to intensive care unit Hospital Medicine Service. [RM]      ED Course User Index  [RM] Alberto Fisher MD                 Clinical Impression:   Final diagnoses:  [K85.90] Acute pancreatitis, unspecified complication status, unspecified pancreatitis type (Primary)  [K20.90] Esophagitis  [K52.9] Enterocolitis  [R45.851] Suicidal ideation        ED Disposition Condition    Admit Stable                Alberto Fisher MD  02/19/23 1555       Alberto Fisher MD  02/19/23 155

## 2023-02-19 NOTE — CONSULTS
[facility]  Psychiatry  Telepsychiatry Consult Note    Please see previous notes:    Patient agreeable to consultation via telepsychiatry.    Tele-Consultation from Psychiatry started: 2/19/2023 at 5:14pm  The chief complaint leading to psychiatric consultation is: hallucinations, suicidal ideations   This consultation was requested by Dr. Fisher , the Emergency Department attending physician.  The location of the consulting psychiatrist is  [facility] .  The patient location is  [facility] EMERGENCY DEPARTMENT   The patient arrived at the ED at: 2:57pm   Also present with the patient at the time of the consultation:    Patient Identification:   Elder Rosales is a 42 y.o. male.    Patient information was obtained from patient.  Patient presented voluntarily to the Emergency Department     Consults  Consult Start Time: 02/19/2023 17:14 CST  Consult End Time: 02/19/2023 17:54 CST      Subjective:     History of Present Illness:  Pt is a 42 yr old male with reported hx of depression and alcohol use disorder who presented with complaints of muscle cramping, dizziness, nausea. Pt is being treated for hyponatriemia, during evaluation pt reported having suicidal ideations and hallucinations.   Pt seen this evening, pt is cooperative and pleasant. Pt endorsed having symptom for 20 yrs, but worsened over the past year. Pt reported both visual and auditory hallucinations. Auditory hallucinations are like a hearing a radio on. Pt reports that his depression is being treated with klonopin and seroquel, is currently on other medications. Pt endorsed poor sleep unless he uses seroquel, poor appetite, limited concentration and ongoing fatigue (however recently worsened while camping). Pt reports having SI but denied any previous attempts. Pt denied homicidal ideations.    Pt reports he stopped drinkign about a month ago     Psychiatric History:   Previous Psychiatric Hospitalizations: No   Previous Medication  "Trials: klonopin, seroquel   Previous Suicide Attempts:  no  History of Violence: yes   History of Depression: yes   History of Brandy: no  History of Auditory/Visual Hallucination yes   History of Delusions: yes   Outpatient psychiatrist (current & past): no    Substance Abuse History:  Tobacco:Yes, reported stopped smoking 2 mos ago  Alcohol: Yes, drank 12 to 15 beers a day until 1 mos ago   Illicit Substances: reports that he was on opiates, transitioned to suboxone but stopped   Detox/Rehab: yes, in 2012 for alcohol use     Legal History: Past charges/incarcerations: Yes     Family Psychiatric History: adopted, doesn't know bio history       Social History:  *Education: GED, some college   Employment Status/Finances:Unemployed   Relationship Status/Sexual Orientation: Single  Children: 3  Housing Status: Home    history:  NO  Access to gun: NO  Episcopal:Non-practicing  Recreational activities: camping    Psychiatric Mental Status Exam:  Arousal: alert  Sensorium/Orientation: oriented to person, place, situation, month of year, year  Behavior/Cooperation: cooperative   Speech: normal tone, normal volume  Language: grossly intact  Mood: " good "   Affect:  reactive  Thought Process: tangential  Thought Content:   Auditory hallucinations: YES:        Visual hallucinations: YES:      Paranoia: YES:      Delusions:  YES:      Suicidal ideation: YES:      Homicidal ideation: NO  Attention/Concentration:  Attentive to interview   Memory:    Recent:  Intact   Remote: Intact    Insight: intact  Judgment: behavior is adequate to circumstances      Past Medical History:   Past Medical History:   Diagnosis Date    Anxiety     Carpal tunnel syndrome     Hepatitis C virus infection cured after antiviral drug therapy     s/p epclusa, svr12 - 3/2021 (treated / cured)    Shoulder disorder       Laboratory Data:   Labs Reviewed   CBC W/ AUTO DIFFERENTIAL - Abnormal; Notable for the following components:       Result " Value    MCHC 37.2 (*)     RDW 15.0 (*)     Platelets 144 (*)     Immature Granulocytes 1.1 (*)     Immature Grans (Abs) 0.10 (*)     Lymph # 0.7 (*)     Mono # 1.3 (*)     nRBC 1 (*)     Gran % 76.2 (*)     Lymph % 7.5 (*)     All other components within normal limits   COMPREHENSIVE METABOLIC PANEL - Abnormal; Notable for the following components:    Sodium 120 (*)     Potassium 2.6 (*)     Chloride 73 (*)     CO2 20 (*)     Glucose 129 (*)     BUN 25 (*)     Total Bilirubin 2.0 (*)      (*)     ALT 99 (*)     Anion Gap 27 (*)     All other components within normal limits    Narrative:     Sodium, potassium, Chloride critical result(s) repeated. Called and   verbal readback obtained from [facility] ED, ADRIAN by HEATH 02/19/2023   11:16   TROPONIN I HIGH SENSITIVITY - Abnormal; Notable for the following components:    Troponin I High Sensitivity 18.6 (*)     All other components within normal limits   B-TYPE NATRIURETIC PEPTIDE - Abnormal; Notable for the following components:     (*)     All other components within normal limits   CK - Abnormal; Notable for the following components:     (*)     All other components within normal limits   LIPASE - Abnormal; Notable for the following components:    Lipase 569 (*)     All other components within normal limits   TROPONIN I HIGH SENSITIVITY - Abnormal; Notable for the following components:    Troponin I High Sensitivity 16.9 (*)     All other components within normal limits   URINALYSIS, REFLEX TO URINE CULTURE - Abnormal; Notable for the following components:    Appearance, UA Hazy (*)     Protein, UA 1+ (*)     Ketones, UA 3+ (*)     Bilirubin (UA) 1+ (*)     Occult Blood UA 1+ (*)     Urobilinogen, UA 2.0-3.0 (*)     All other components within normal limits    Narrative:     Specimen Source->Urine   SODIUM, URINE, RANDOM - Abnormal; Notable for the following components:    Sodium, Urine <10 (*)     All other components within normal limits     Narrative:     Specimen Source->Urine   CHLORIDE, URINE, RANDOM - Abnormal; Notable for the following components:    Chloride, Urine <15 (*)     All other components within normal limits    Narrative:     Specimen Source->Urine   OSMOLALITY, SERUM - Abnormal; Notable for the following components:    Osmolality 267 (*)     All other components within normal limits   SODIUM - Abnormal; Notable for the following components:    Sodium 123 (*)     All other components within normal limits    Narrative:     Sodium critical result(s) repeated. Called and verbal readback   obtained from Chelle Zhang ED, RN by HEATH 02/19/2023 15:21   URINALYSIS MICROSCOPIC - Abnormal; Notable for the following components:    RBC, UA 19 (*)     WBC, UA 35 (*)     Hyaline Casts, UA 26 (*)     All other components within normal limits    Narrative:     Specimen Source->Urine   CULTURE, URINE   MAGNESIUM   SARS-COV-2 RNA AMPLIFICATION, QUAL   DRUG SCREEN PANEL, URINE EMERGENCY    Narrative:     Specimen Source->Urine   ALCOHOL,MEDICAL (ETHANOL)   ALCOHOL,MEDICAL (ETHANOL)   OSMOLALITY, URINE RANDOM    Narrative:     Specimen Source->Urine   POTASSIUM, URINE, RANDOM    Narrative:     Specimen Source->Urine   SODIUM   MAGNESIUM   BASIC METABOLIC PANEL   BASIC METABOLIC PANEL         Allergies:   Review of patient's allergies indicates:  No Known Allergies    Medications in ER:   Medications   ondansetron injection 4 mg (0 mg Intravenous Hold 2/19/23 1500)   potassium chloride 10 mEq in 100 mL IVPB (has no administration in time range)   0.9%  NaCl infusion (has no administration in time range)   lactated ringers bolus 1,000 mL (0 mLs Intravenous Stopped 2/19/23 1120)   ondansetron injection 4 mg (4 mg Intravenous Given 2/19/23 1000)   ondansetron injection 4 mg (4 mg Intravenous Given 2/19/23 1446)   iohexoL (OMNIPAQUE 350) injection 100 mL (100 mLs Intravenous Given 2/19/23 1428)   pantoprazole injection 40 mg (40 mg Intravenous Given 2/19/23  1510)   0.9%  NaCl infusion (1,000 mLs Intravenous New Bag 2/19/23 1510)   HYDROmorphone injection 0.5 mg (0.5 mg Intravenous Given 2/19/23 1510)       Medications at home: seroquel, klonopin    No new subjective & objective note has been filed under this hospital service since the last note was generated.      Assessment - Diagnosis - Goals:     Diagnosis/Impression:   Unspecified Depressive Disorder  Psychosis NOS      Rec:   -Would maintain PEC at this time, continue to have regular psych evaluations for possibility of rescinding   -would not begin psychotropics at this time due to severity of hyponatermia, pt is not overtly psychotic at this time  -once hyponatermia is corrected, would begin seroquel 100mg qhs for sleep and titrate up as tolerated for psychosis symptoms, consider addition of remeron 15mg qhs for depression      Time with patient: 30min       More than 50% of the time was spent counseling/coordinating care    Consulting clinician was informed of the encounter and consult note.    Consultation ended: 2/19/2023 at 5:54pm     Simona Fountain MD   Psychiatry  [facility]

## 2023-02-19 NOTE — CONSULTS
INPATIENT NEPHROLOGY CONSULT   [facility] NEPHROLOGY    Elder Rosales  02/19/2023    Reason for consultation:    Hyponatremia, virgil    Chief Complaint:   Chief Complaint   Patient presents with    Hip Pain     LEFT, CAMPING IN WOODS X COUPLE DAYS, STATES FELL COUPLE TIMES    Vomiting    Dizziness     X 2-3 DAYS          History of Present Illness:    Per H and P    42-year-old male with history of anxiety, previous heavy tobacco and alcohol use.  Patient states last alcohol use was proximally 2 months ago.  Used to smoke a pack a day also drank a case of beer daily for several years.  Patient presents emergency department with complaint of generalized upper lower extremity cramping abdominal pain, dizziness weakness with nausea which has been noted over last 3 days.  Patient states that he was camping in the woods and decided that he could not make it out of the woods due to feeling so weak.  Patient states that he was stumbling and had to wait in order to walk out of the roads.  He feels like his vertigo was acting up.  He denies fever, no headache, did endorse dizziness.  Patient decided to come to ER due to persistent symptoms.    2/19  c/o diffuse musculoskeletal pain.   He has pleurisy.  He is very hungry.  His vertigo is better.  No urinary complaints.  He states his stool is black.      Plan of Care:       Assessment:    Hyponatremia likely due to not eating for several days and drinking a lot of water  --urine osm  --uric acid  --tsh  --f/u chest imaging  --bnp  --fluid restrict if urine osm elevated and sodium drops on Normal Saline  --q 3 hour sodium    Virgil  --pt taking po without difficulty  --stop iv fluids if sodium goes below 120  --Avoid NSAIDS, Salmno II inhibitors, and other non-essential nephrotoxic agents  --follow cr closely after administration of iv contrast  --ua with micro    Hypertension  --continue amlodipine  --continue lisinopril.  Hold if cr goes up    Hypokalemia  --replete  --check  magnesium       Thank you for allowing us to participate in this patient's care. We will continue to follow.    Vital Signs:  Temp Readings from Last 3 Encounters:   02/19/23 98.6 °F (37 °C) (Oral)   04/15/22 99.8 °F (37.7 °C) (Oral)   02/04/22 98.2 °F (36.8 °C) (Oral)       Pulse Readings from Last 3 Encounters:   02/19/23 106   04/15/22 76   02/04/22 105       BP Readings from Last 3 Encounters:   02/19/23 (!) 155/74   04/15/22 133/84   02/04/22 (!) 150/95       Weight:  Wt Readings from Last 3 Encounters:   02/19/23 81.6 kg (180 lb)   04/15/22 83.5 kg (184 lb)   01/17/22 102.1 kg (225 lb)       Past Medical & Surgical History:  Past Medical History:   Diagnosis Date    Anxiety     Carpal tunnel syndrome     Hepatitis C virus infection cured after antiviral drug therapy     s/p epclusa, svr12 - 3/2021 (treated / cured)    Shoulder disorder        No past surgical history on file.    Past Social History:  Social History     Socioeconomic History    Marital status: Single   Tobacco Use    Smoking status: Every Day     Packs/day: 0.50     Types: Cigarettes    Smokeless tobacco: Never   Substance and Sexual Activity    Alcohol use: Yes     Comment: bourbon    Drug use: Not Currently     Types: Marijuana, Cocaine, Anabolic steroids    Sexual activity: Yes     Partners: Female       Medications:  No current facility-administered medications on file prior to encounter.     Current Outpatient Medications on File Prior to Encounter   Medication Sig Dispense Refill    buprenorphine-naloxone 8-2 mg (SUBOXONE) 8-2 mg Place under the tongue.      cetirizine (ZYRTEC) 10 MG tablet Take 1 tablet (10 mg total) by mouth once daily. 30 tablet 0    clonazePAM (KLONOPIN) 2 MG Tab Take 2 mg by mouth 2 (two) times daily.      dexchlorphen-phenylephrine-DM (POLYTUSSIN DM) 1-5-10 mg/5 mL Syrp Take 5 mLs by mouth every 4 (four) hours as needed. 120 mL 0    fluticasone propionate (FLONASE) 50 mcg/actuation nasal spray 1 spray (50 mcg total)  by Each Nostril route once daily. 15.8 mL 0    gabapentin (NEURONTIN) 600 MG tablet Take 1 tablet (600 mg total) by mouth once daily. 90 tablet 3    meclizine (ANTIVERT) 25 mg tablet Take 1 tablet (25 mg total) by mouth 3 (three) times daily as needed for Dizziness. 20 tablet 0    ondansetron (ZOFRAN-ODT) 4 MG TbDL Take 1 tablet (4 mg total) by mouth every 8 (eight) hours as needed (nausea). 20 tablet 0    traZODone (DESYREL) 100 MG tablet TAKE 1/2 TO 1 TABLET BY MOUTH EVERY EVENING       Scheduled Meds:   ondansetron  4 mg Intravenous ED 1 Time     Continuous Infusions:  PRN Meds:.    Allergies:  Patient has no known allergies.    Past Family History:  Reviewed; refer to Hospitalist Admission Note    Review of Systems:  Review of Systems - All 14 systems reviewed and negative, except as noted in HPI    Physical Exam:    BP (!) 155/74   Pulse 106   Temp 98.6 °F (37 °C) (Oral)   Resp 16   Ht 6' (1.829 m)   Wt 81.6 kg (180 lb)   SpO2 100%   BMI 24.41 kg/m²     General Appearance:    Alert, cooperative, no distress, appears stated age   Head:    Normocephalic, without obvious abnormality, atraumatic   Eyes:    PER, conjunctiva/corneas clear, EOM's intact in both eyes        Throat:   Lips, mucosa, and tongue normal; teeth and gums normal   Back:     Symmetric, no curvature, ROM normal, no CVA tenderness   Lungs:     Clear to auscultation bilaterally, respirations unlabored   Chest wall:    No tenderness or deformity   Heart:    Regular rate and rhythm, S1 and S2 normal, no murmur, rub   or gallop   Abdomen:     Soft, non-tender, bowel sounds active all four quadrants,     no masses, no organomegaly   Extremities:   Extremities normal, atraumatic, no cyanosis or edema   Pulses:   2+ and symmetric all extremities   MSK:   No joint or muscle swelling, tenderness or deformity   Skin:   Skin color, texture, turgor normal, no rashes or lesions   Neurologic:   CNII-XII intact, normal strength and sensation        Throughout.  No flap     Results:  Lab Results   Component Value Date     (LL) 02/19/2023    K 2.6 (LL) 02/19/2023    CL 73 (LL) 02/19/2023    CO2 20 (L) 02/19/2023    BUN 25 (H) 02/19/2023    CREATININE 1.4 02/19/2023    CALCIUM 9.5 02/19/2023    ANIONGAP 27 (H) 02/19/2023    ESTGFRAFRICA >60 02/04/2022    EGFRNONAA >60 02/04/2022       Lab Results   Component Value Date    CALCIUM 9.5 02/19/2023    PHOS 3.9 10/04/2017       Recent Labs   Lab 02/19/23  1000   WBC 9.08   RBC 5.03   HGB 15.3   HCT 41.1   *   MCV 82   MCH 30.4   MCHC 37.2*          I have personally reviewed pertinent radiological imaging and reports.    Patient care was time spent personally by me on the following activities:   Obtaining a history  Examination of patient.  Providing medical care at the patients bedside.  Developing a treatment plan with patient or surrogate and bedside caregivers  Ordering and reviewing laboratory studies, radiographic studies, pulse oximetry.  Ordering and performing treatments and interventions.  Evaluation of patient's response to treatment.  Discussions with consultants while on the unit and immediately available to the patient.  Re-evaluation of the patient's condition.  Documentation in the medical record.     Tommie Chang MD  Nephrology  [facility] [place]  [phone]

## 2023-02-20 LAB
ANION GAP SERPL CALC-SCNC: 13 MMOL/L (ref 8–16)
ANION GAP SERPL CALC-SCNC: 15 MMOL/L (ref 8–16)
ANION GAP SERPL CALC-SCNC: 20 MMOL/L (ref 8–16)
BUN SERPL-MCNC: 15 MG/DL (ref 6–20)
BUN SERPL-MCNC: 16 MG/DL (ref 6–20)
BUN SERPL-MCNC: 19 MG/DL (ref 6–20)
CALCIUM SERPL-MCNC: 8.6 MG/DL (ref 8.7–10.5)
CALCIUM SERPL-MCNC: 8.8 MG/DL (ref 8.7–10.5)
CALCIUM SERPL-MCNC: 9.5 MG/DL (ref 8.7–10.5)
CHLORIDE SERPL-SCNC: 83 MMOL/L (ref 95–110)
CHLORIDE SERPL-SCNC: 89 MMOL/L (ref 95–110)
CHLORIDE SERPL-SCNC: 90 MMOL/L (ref 95–110)
CO2 SERPL-SCNC: 23 MMOL/L (ref 23–29)
CO2 SERPL-SCNC: 25 MMOL/L (ref 23–29)
CO2 SERPL-SCNC: 26 MMOL/L (ref 23–29)
CREAT SERPL-MCNC: 0.8 MG/DL (ref 0.5–1.4)
CREAT SERPL-MCNC: 0.8 MG/DL (ref 0.5–1.4)
CREAT SERPL-MCNC: 1 MG/DL (ref 0.5–1.4)
EST. GFR  (NO RACE VARIABLE): >60 ML/MIN/1.73 M^2
GLUCOSE SERPL-MCNC: 84 MG/DL (ref 70–110)
GLUCOSE SERPL-MCNC: 92 MG/DL (ref 70–110)
GLUCOSE SERPL-MCNC: 93 MG/DL (ref 70–110)
MAGNESIUM SERPL-MCNC: 1.8 MG/DL (ref 1.6–2.6)
POTASSIUM SERPL-SCNC: 2.8 MMOL/L (ref 3.5–5.1)
POTASSIUM SERPL-SCNC: 2.8 MMOL/L (ref 3.5–5.1)
POTASSIUM SERPL-SCNC: 2.9 MMOL/L (ref 3.5–5.1)
SODIUM SERPL-SCNC: 126 MMOL/L (ref 136–145)
SODIUM SERPL-SCNC: 128 MMOL/L (ref 136–145)
SODIUM SERPL-SCNC: 130 MMOL/L (ref 136–145)

## 2023-02-20 PROCEDURE — 80048 BASIC METABOLIC PNL TOTAL CA: CPT | Mod: 91 | Performed by: STUDENT IN AN ORGANIZED HEALTH CARE EDUCATION/TRAINING PROGRAM

## 2023-02-20 PROCEDURE — 25000003 PHARM REV CODE 250: Performed by: NURSE PRACTITIONER

## 2023-02-20 PROCEDURE — 20000000 HC ICU ROOM

## 2023-02-20 PROCEDURE — 25000003 PHARM REV CODE 250: Performed by: INTERNAL MEDICINE

## 2023-02-20 PROCEDURE — 36415 COLL VENOUS BLD VENIPUNCTURE: CPT | Performed by: INTERNAL MEDICINE

## 2023-02-20 PROCEDURE — 94761 N-INVAS EAR/PLS OXIMETRY MLT: CPT

## 2023-02-20 PROCEDURE — 63600175 PHARM REV CODE 636 W HCPCS: Performed by: STUDENT IN AN ORGANIZED HEALTH CARE EDUCATION/TRAINING PROGRAM

## 2023-02-20 PROCEDURE — 63600175 PHARM REV CODE 636 W HCPCS: Performed by: NURSE PRACTITIONER

## 2023-02-20 PROCEDURE — 83735 ASSAY OF MAGNESIUM: CPT | Performed by: INTERNAL MEDICINE

## 2023-02-20 PROCEDURE — 80048 BASIC METABOLIC PNL TOTAL CA: CPT | Mod: 91 | Performed by: INTERNAL MEDICINE

## 2023-02-20 PROCEDURE — 36415 COLL VENOUS BLD VENIPUNCTURE: CPT | Performed by: STUDENT IN AN ORGANIZED HEALTH CARE EDUCATION/TRAINING PROGRAM

## 2023-02-20 RX ORDER — AMLODIPINE BESYLATE 5 MG/1
5 TABLET ORAL DAILY
Status: DISCONTINUED | OUTPATIENT
Start: 2023-02-20 | End: 2023-02-21

## 2023-02-20 RX ORDER — CLONAZEPAM 1 MG/1
1 TABLET ORAL DAILY PRN
Status: DISCONTINUED | OUTPATIENT
Start: 2023-02-20 | End: 2023-02-26

## 2023-02-20 RX ORDER — QUETIAPINE FUMARATE 100 MG/1
100 TABLET, FILM COATED ORAL NIGHTLY
Status: DISCONTINUED | OUTPATIENT
Start: 2023-02-20 | End: 2023-03-04 | Stop reason: HOSPADM

## 2023-02-20 RX ORDER — ONDANSETRON 2 MG/ML
4 INJECTION INTRAMUSCULAR; INTRAVENOUS EVERY 6 HOURS PRN
Status: DISCONTINUED | OUTPATIENT
Start: 2023-02-20 | End: 2023-03-04 | Stop reason: HOSPADM

## 2023-02-20 RX ORDER — MORPHINE SULFATE 4 MG/ML
3 INJECTION, SOLUTION INTRAMUSCULAR; INTRAVENOUS
Status: DISCONTINUED | OUTPATIENT
Start: 2023-02-20 | End: 2023-02-23

## 2023-02-20 RX ORDER — CETIRIZINE HYDROCHLORIDE 10 MG/1
10 TABLET ORAL DAILY
Status: DISCONTINUED | OUTPATIENT
Start: 2023-02-20 | End: 2023-02-21

## 2023-02-20 RX ORDER — GABAPENTIN 300 MG/1
600 CAPSULE ORAL 3 TIMES DAILY
Status: DISCONTINUED | OUTPATIENT
Start: 2023-02-20 | End: 2023-02-21

## 2023-02-20 RX ADMIN — POTASSIUM CHLORIDE 40 MEQ: 7.46 INJECTION, SOLUTION INTRAVENOUS at 12:02

## 2023-02-20 RX ADMIN — POTASSIUM CHLORIDE 10 MEQ: 7.46 INJECTION, SOLUTION INTRAVENOUS at 10:02

## 2023-02-20 RX ADMIN — QUETIAPINE 100 MG: 100 TABLET ORAL at 08:02

## 2023-02-20 RX ADMIN — CETIRIZINE HYDROCHLORIDE 10 MG: 10 TABLET, FILM COATED ORAL at 09:02

## 2023-02-20 RX ADMIN — MAGNESIUM SULFATE HEPTAHYDRATE 2 G: 40 INJECTION, SOLUTION INTRAVENOUS at 05:02

## 2023-02-20 RX ADMIN — POTASSIUM CHLORIDE 10 MEQ: 7.46 INJECTION, SOLUTION INTRAVENOUS at 07:02

## 2023-02-20 RX ADMIN — TRAMADOL HYDROCHLORIDE 50 MG: 50 TABLET, COATED ORAL at 06:02

## 2023-02-20 RX ADMIN — GABAPENTIN 600 MG: 300 CAPSULE ORAL at 02:02

## 2023-02-20 RX ADMIN — SODIUM CHLORIDE: 0.9 INJECTION, SOLUTION INTRAVENOUS at 04:02

## 2023-02-20 RX ADMIN — MORPHINE SULFATE 3 MG: 4 INJECTION, SOLUTION INTRAMUSCULAR; INTRAVENOUS at 08:02

## 2023-02-20 RX ADMIN — POTASSIUM CHLORIDE 10 MEQ: 7.46 INJECTION, SOLUTION INTRAVENOUS at 08:02

## 2023-02-20 RX ADMIN — AMLODIPINE BESYLATE 5 MG: 5 TABLET ORAL at 09:02

## 2023-02-20 RX ADMIN — POTASSIUM CHLORIDE 40 MEQ: 7.46 INJECTION, SOLUTION INTRAVENOUS at 04:02

## 2023-02-20 RX ADMIN — GABAPENTIN 600 MG: 300 CAPSULE ORAL at 09:02

## 2023-02-20 RX ADMIN — GABAPENTIN 600 MG: 300 CAPSULE ORAL at 08:02

## 2023-02-20 RX ADMIN — POTASSIUM CHLORIDE 10 MEQ: 7.46 INJECTION, SOLUTION INTRAVENOUS at 11:02

## 2023-02-20 RX ADMIN — SODIUM CHLORIDE: 0.9 INJECTION, SOLUTION INTRAVENOUS at 07:02

## 2023-02-20 NOTE — PROGRESS NOTES
"[facility] - Emergency Dept  Hospital Medicine  Progress Note    Patient Name: Elder Rosales  MRN: [id number]  Patient Class: IP- Inpatient   Admission Date: 2/19/2023  Length of Stay: 1 days  Attending Physician: Jamal Sy MD  Primary Care Provider: [facility]        Subjective:     Principal Problem:Acute pancreatitis        HPI:  Patient is a 41 yo cauc male with hx ETOH, >20y/pack/d Reformed smoker, vertigo, ADHD, IVDU, Polysubstance abused,treated Hep c . He reports going camping and fishing 3 days ago and experienced vertigo, abdominal pain, N/V, falling and imbalance, hearing voices in his head and talking to himself. He report dark black stools for 4 days however hbg is stable. He states, " he stop use to smoke a pack/d, and drink 12-15 beers a day but stopped a month ago". While in the ED reported suicidal thoughts and depression and asked to speak with a psychiatrist. He was evaluated by telepsych. On assessment patient was fidgeting, difficulty focusing, not staying on track with questions asked and gaurded to left hip and back. He denied drug use however past documentation states iv drug user,and  polysubstance abuse. He denies chest pain, dysuria, fever, and chills.     Hospitalist asked to admit for hyponatermia , pancreatitis, enterocolitis, abd ct abnormality. Nephrology, GI consulted in ED.      Overview/Hospital Course:  No notes on file    Interval History:  Patient continues with abdominal pain.  JAIME as resolving.  Remains hypokalemic.  Sodium is improving.  Overall continues with abdominal pain even with sips of water.    Review of Systems   All other systems reviewed and are negative.  Objective:     Vital Signs (Most Recent):  Temp: 98.6 °F (37 °C) (02/19/23 0923)  Pulse: 61 (02/20/23 1119)  Resp: 18 (02/20/23 1119)  BP: 117/62 (02/20/23 1119)  SpO2: 100 % (02/20/23 1119) Vital Signs (24h Range):  Pulse:  [58-89] " 61  Resp:  [16-18] 18  SpO2:  [98 %-100 %] 100 %  BP: (117-154)/(62-93) 117/62     Weight: 81.6 kg (180 lb)  Body mass index is 24.41 kg/m².    Intake/Output Summary (Last 24 hours) at 2/20/2023 1504  Last data filed at 2/20/2023 1102  Gross per 24 hour   Intake 200 ml   Output --   Net 200 ml      Physical Exam  Vitals reviewed.   Constitutional:       Appearance: Normal appearance.   HENT:      Head: Normocephalic and atraumatic.      Nose: Nose normal.      Mouth/Throat:      Mouth: Mucous membranes are moist.   Eyes:      Pupils: Pupils are equal, round, and reactive to light.   Cardiovascular:      Rate and Rhythm: Normal rate and regular rhythm.      Pulses: Normal pulses.      Heart sounds: Normal heart sounds. No murmur heard.    No friction rub. No gallop.   Pulmonary:      Effort: Pulmonary effort is normal. No respiratory distress.      Breath sounds: Normal breath sounds.   Abdominal:      General: Abdomen is flat. Bowel sounds are normal. There is no distension.      Palpations: Abdomen is soft.      Tenderness: There is abdominal tenderness.   Musculoskeletal:         General: No swelling. Normal range of motion.      Cervical back: Normal range of motion and neck supple.   Skin:     General: Skin is warm and dry.   Neurological:      General: No focal deficit present.      Mental Status: He is alert and oriented to person, place, and time.   Psychiatric:         Mood and Affect: Mood normal.         Behavior: Behavior normal.         Thought Content: Thought content normal.         Judgment: Judgment normal.       Significant Labs: All pertinent labs within the past 24 hours have been reviewed.    Significant Imaging: I have reviewed all pertinent imaging results/findings within the past 24 hours.      Assessment/Plan:      * Acute pancreatitis  Patient presented with acute pancreatitis, lipase 569  - maintain NPO  - continue IV fluids  - pain control  - monitor for improvement  - advance diet as  tolerated  - Protonix IV      Suicidal ideation  Suicide precautions.  ICU  Psych consult  Will need placement  PEC         Hypokalemia  Aggressive replacement        JAIME (acute kidney injury)  JAIME due to dehydration  Improved with IV fluids  Appreciate nephrology recommendations        Hypertension, essential  Vs Q 4 hrs  Continue home medication  Iv hydralazine 10mg prn sbp >180       Low blood magnesium  Replace as needed      Hyponatremia  Sodium 120 on admission  Has corrected to 128 within 12 hours  Correcting slowly  Continue IV fluids per Nephrology appreciate their recommendations  Likely a combination of dilutional and hypovolemic hyponatremia      Chronic hepatitis C virus infection  Needs outpatient follow-up        VTE Risk Mitigation (From admission, onward)         Ordered     IP VTE LOW RISK PATIENT  Once         02/19/23 1822                Discharge Planning   FLORINA: 2/20/2023     Code Status: Full Code   Is the patient medically ready for discharge?:     Reason for patient still in hospital (select all that apply): Treatment  Discharge Plan A: Home                  Jamal Sy MD  Department of Hospital Medicine   [facility] - Emergency Dept

## 2023-02-20 NOTE — CONSULTS
"[facility] - Emergency Dept  Gastroenterology  Consult Note    Patient Name: Elder Rosales  MRN: [id number]  Admission Date: 2/19/2023  Hospital Length of Stay: 1 days  Code Status: Full Code   Attending Provider: Jamal Sy MD   Consulting Provider: Bebe Hernandez MD  Primary Care Physician: [facility]  Principal Problem:Acute pancreatitis    Consults  Subjective:     HPI: Patient is a 43 yo cauc male with hx ETOH, >20y/pack/d Reformed smoker, vertigo, ADHD, IVDU, Polysubstance abused,treated Hep c . He reports going camping and fishing 3 days ago and experienced vertigo, abdominal pain, N/V, falling and imbalance, hearing voices in his head and talking to himself. He report dark black stools for 4 days however hbg is stable. He states, " he stop use to smoke a pack/d, and drink 12-15 beers a day but stopped a month ago". While in the ED reported suicidal thoughts and depression and asked to speak with a psychiatrist. He was evaluated by telepsych. On assessment patient was fidgeting, difficulty focusing, not staying on track with questions asked and gaurded to left hip and back. He denied drug use however past documentation states iv drug user,and  polysubstance abuse. He denies chest pain, dysuria, fever, and chills.      Hospitalist asked to admit for hyponatermia , pancreatitis, enterocolitis, abd ct abnormality. Nephrology, GI consulted in ED.    Patient reports he was out at a camp on drinking he is had excessive vertigo he tried to get up and then he ended up falling bruising his hip his abdomen pelvis on and with that subsequent pain he started having nausea and vomiting with vomiting for 3-4 days as that was very dark so he could not tell if the vomitus was bloody could not do not whether he was having any melena.  On admission today he says that his pain in his abdomen is resolved.  CT imaging was reviewed with the patient "     Patient denies any baseline esophageal dysphagia.  Denies any prior episodes of on hematemesis and he denies any prior endoscopy    Past Medical History:   Diagnosis Date    Anxiety     Carpal tunnel syndrome     Hepatitis C virus infection cured after antiviral drug therapy     s/p epclusa, svr12 - 3/2021 (treated / cured)    Shoulder disorder        No past surgical history on file.    Review of patient's allergies indicates:  No Known Allergies  Family History       Problem Relation (Age of Onset)    No Known Problems Mother, Father          Tobacco Use    Smoking status: Every Day     Packs/day: 0.50     Types: Cigarettes    Smokeless tobacco: Never   Substance and Sexual Activity    Alcohol use: Yes     Comment: bourbon    Drug use: Not Currently     Types: Marijuana, Cocaine, Anabolic steroids    Sexual activity: Yes     Partners: Female     Review of Systems  Objective:     Vital Signs (Most Recent):  Temp: 98.6 °F (37 °C) (02/19/23 0923)  Pulse: 61 (02/20/23 1119)  Resp: 18 (02/20/23 1119)  BP: 117/62 (02/20/23 1119)  SpO2: 100 % (02/20/23 1119) Vital Signs (24h Range):  Pulse:  [58-89] 61  Resp:  [16-18] 18  SpO2:  [98 %-100 %] 100 %  BP: (117-154)/(62-93) 117/62     Weight: 81.6 kg (180 lb) (02/19/23 0923)  Body mass index is 24.41 kg/m².      Intake/Output Summary (Last 24 hours) at 2/20/2023 1536  Last data filed at 2/20/2023 1102  Gross per 24 hour   Intake 200 ml   Output --   Net 200 ml       Lines/Drains/Airways       Peripheral Intravenous Line  Duration                  Peripheral IV - Single Lumen 02/19/23 0959 20 G Left Antecubital 1 day         Peripheral IV - Single Lumen 02/20/23 0752 20 G Right Antecubital <1 day                    Physical Exam  Physical Exam:  General- Patient alert and oriented x3 in NAD  HEENT- PERRLA, EOMI, OP clear, MMM  Neck- No JVD, Lymphadenopathy, Thyromegaly  CV- Regular rate and rhythm, No Murmur/nima/rubs  Resp- Lungs CTA Bilaterally, No increased WOB  GI-  Non tender/non-distended, BS normoactive x4 quads, no HSM  Extrem- No cyanosis, clubbing, edema. Pulses 2+ and symmetric  Neuro- Strength 5/5 flexors/extensors, DTRs 2+ and symmetric, Intact sensation to light touch grossly    Significant Labs:  Amylase: No results for input(s): AMYLASE in the last 48 hours.  Blood Culture: No results for input(s): LABBLOO in the last 48 hours.  CBC:   Recent Labs   Lab 02/19/23  1000   WBC 9.08   HGB 15.3   HCT 41.1   *     CMP:   Recent Labs   Lab 02/19/23  1000 02/19/23  1157 02/20/23  1301   *   < > 93   CALCIUM 9.5   < > 8.8   ALBUMIN 4.0  --   --    PROT 7.4  --   --    *   < > 128*   K 2.6*   < > 2.8*   CO2 20*   < > 25   CL 73*   < > 90*   BUN 25*   < > 16   CREATININE 1.4   < > 0.8   ALKPHOS 71  --   --    ALT 99*  --   --    *  --   --    BILITOT 2.0*  --   --     < > = values in this interval not displayed.     Coagulation: No results for input(s): PT, INR, APTT in the last 48 hours.  Lipase:   Recent Labs   Lab 02/19/23  1000   LIPASE 569*       Significant Imaging:  U/S: I have reviewed all results within the past 24 hours and my personal findings are:  IMPRESSION:  IMPRESSION:   1. Exam is limited as noted above.   2. Fatty infiltration of the liver.   3. No evidence of cholecystitis or obstruction.     CT of the chest:   IMPRESSION:   1. There is no identifiable extravasation of ingested water-soluble oral contrast material to indicate esophageal perforation. There is no pathologic mediastinal fluid collection or pleural effusion. If there is persistent clinical suspicion of esophageal perforation, endoscopy should be considered.   2. Mild pneumomediastinum, with air primarily around the left main bronchus and in a paraesophageal distribution     CT abdomen pelvis:   1. Subtle fat stranding adjacent to the pancreatic tail suspicious for early or mild pancreatitis. Correlate with amylase and lipase levels.   2. Focal pneumomediastinum, with  multiple air bubbles noted dorsal to the distal esophagus. The esophagus is slightly thick-walled which could be on the basis of esophagitis or incomplete distention. While esophageal perforation is felt unlikely, if this is a strong clinical concern, CT chest following water-soluble oral contrast administration could be performed.   3. Severe hepatic steatosis. Hyperdense appearance of the gallbladder is likely on the basis of hyperdense bile.   4. Slightly thick-walled appearance of the colon and of the ileum. Correlate clinically for enterocolitis. Liver disease/hypoalbuminemia could also give this same appearance, and wall thickening of the colon may be in part related to incomplete distention.   5. Additional findings as above.       Assessment/Plan:     Active Diagnoses:    Diagnosis Date Noted POA    PRINCIPAL PROBLEM:  Acute pancreatitis [K85.90] 02/19/2023 Unknown    Hyponatremia [E87.1] 02/19/2023 Yes    Low blood magnesium [R79.0] 02/19/2023 Yes    Hypertension, essential [I10] 02/19/2023 Yes     Chronic    JAIME (acute kidney injury) [N17.9] 02/19/2023 Yes    Hypokalemia [E87.6] 02/19/2023 Yes    Suicidal ideation [R45.851] 02/19/2023 Not Applicable     Chronic    Chronic hepatitis C virus infection [B18.2] 10/04/2017 Yes      Problems Resolved During this Admission:    Diagnosis Date Noted Date Resolved POA    Enterocolitis [K52.9] 02/19/2023 02/19/2023 Yes       42-year-old gentleman admitted to the hospital with abdominal pain and likely alcohol-related pancreatitis.  Found to have suicidal ideations and is pec awaiting placement with findings suspicious for Camila-Portillo tear    Acute pancreatitis  Patient presented with acute pancreatitis, lipase 569  - obtain esophagram to ensure no extravasation and okay to slowly advance diet to full liquids to follow esophagram and then solid food in 48 hours     Suspect Camila-Portillo tear in the setting of pancreatitis and 3-4 days of retching.  Repeat esophagram  and okay to advance to liquid diet if persistent negative for extravasation.  Esophageal thickening on CT imaging right represent some underlying eosinophilic esophagitis but would like to hold off on endoscopy for now.  Continue Protonix b.i.d.     Suicidal ideation  Suicide precautions.  ICU  Psych consult  Will need placement  PEC            Hypokalemia  Aggressive replacement           JAIME (acute kidney injury)  JAIME due to dehydration  Improved with IV fluids  Appreciate nephrology recommendations           Hypertension, essential  Vs Q 4 hrs  Continue home medication  Iv hydralazine 10mg prn sbp >180         Low blood magnesium  Replace as needed        Hyponatremia  Sodium 120 on admission  Has corrected to 128 within 12 hours  Correcting slowly  Continue IV fluids per Nephrology appreciate their recommendations  Likely a combination of dilutional and hypovolemic hyponatremia        Chronic hepatitis C virus infection  Needs outpatient follow-up - viral load was detectable in 2020 and undetectable in 2021.  Thank you for your consult. I will follow-up with patient. Please contact us if you have any additional questions.    Bebe Hernandez MD  Gastroenterology  [facility] - Emergency Dept

## 2023-02-20 NOTE — SUBJECTIVE & OBJECTIVE
Interval History:  Patient continues with abdominal pain.  JAIME as resolving.  Remains hypokalemic.  Sodium is improving.  Overall continues with abdominal pain even with sips of water.    Review of Systems   All other systems reviewed and are negative.  Objective:     Vital Signs (Most Recent):  Temp: 98.6 °F (37 °C) (02/19/23 0923)  Pulse: 61 (02/20/23 1119)  Resp: 18 (02/20/23 1119)  BP: 117/62 (02/20/23 1119)  SpO2: 100 % (02/20/23 1119) Vital Signs (24h Range):  Pulse:  [58-89] 61  Resp:  [16-18] 18  SpO2:  [98 %-100 %] 100 %  BP: (117-154)/(62-93) 117/62     Weight: 81.6 kg (180 lb)  Body mass index is 24.41 kg/m².    Intake/Output Summary (Last 24 hours) at 2/20/2023 1504  Last data filed at 2/20/2023 1102  Gross per 24 hour   Intake 200 ml   Output --   Net 200 ml      Physical Exam  Vitals reviewed.   Constitutional:       Appearance: Normal appearance.   HENT:      Head: Normocephalic and atraumatic.      Nose: Nose normal.      Mouth/Throat:      Mouth: Mucous membranes are moist.   Eyes:      Pupils: Pupils are equal, round, and reactive to light.   Cardiovascular:      Rate and Rhythm: Normal rate and regular rhythm.      Pulses: Normal pulses.      Heart sounds: Normal heart sounds. No murmur heard.    No friction rub. No gallop.   Pulmonary:      Effort: Pulmonary effort is normal. No respiratory distress.      Breath sounds: Normal breath sounds.   Abdominal:      General: Abdomen is flat. Bowel sounds are normal. There is no distension.      Palpations: Abdomen is soft.      Tenderness: There is abdominal tenderness.   Musculoskeletal:         General: No swelling. Normal range of motion.      Cervical back: Normal range of motion and neck supple.   Skin:     General: Skin is warm and dry.   Neurological:      General: No focal deficit present.      Mental Status: He is alert and oriented to person, place, and time.   Psychiatric:         Mood and Affect: Mood normal.         Behavior: Behavior  normal.         Thought Content: Thought content normal.         Judgment: Judgment normal.       Significant Labs: All pertinent labs within the past 24 hours have been reviewed.    Significant Imaging: I have reviewed all pertinent imaging results/findings within the past 24 hours.

## 2023-02-20 NOTE — ASSESSMENT & PLAN NOTE
Patient presented with acute pancreatitis, lipase 569  - maintain NPO  - continue IV fluids  - pain control  - monitor for improvement  - advance diet as tolerated  - Protonix IV

## 2023-02-20 NOTE — SUBJECTIVE & OBJECTIVE
Past Medical History:   Diagnosis Date    Anxiety     Carpal tunnel syndrome     Hepatitis C virus infection cured after antiviral drug therapy     s/p epclusa, svr12 - 3/2021 (treated / cured)    Shoulder disorder        No past surgical history on file.    Review of patient's allergies indicates:  No Known Allergies    No current facility-administered medications on file prior to encounter.     Current Outpatient Medications on File Prior to Encounter   Medication Sig    amLODIPine (NORVASC) 5 MG tablet Take 5 mg by mouth once daily.    clonazePAM (KLONOPIN) 1 MG tablet Take 1 mg by mouth daily as needed.    QUEtiapine (SEROQUEL) 100 MG Tab Take 100 mg by mouth every evening.    buprenorphine-naloxone 8-2 (SUBOXONE) 8-2 mg Subl buprenorphine 8 mg-naloxone 2 mg sublingual tablet   DISSOLVE THREE TABLETS UNDER THE TONGUE ONCE DAILY    buprenorphine-naloxone 8-2 mg (SUBOXONE) 8-2 mg Place under the tongue.    buPROPion (WELLBUTRIN XL) 150 MG TB24 tablet Take 150 mg by mouth every morning.    busPIRone (BUSPAR) 10 MG tablet buspirone 10 mg tablet   TAKE ONE TABLET BY MOUTH TWICE DAILY    carvediloL (COREG) 3.125 MG tablet Take 3.125 mg by mouth 2 (two) times daily.    cetirizine (ZYRTEC) 10 MG tablet Take 1 tablet (10 mg total) by mouth once daily.    clonazePAM (KLONOPIN) 2 MG Tab Take 2 mg by mouth 2 (two) times daily.    dexchlorphen-phenylephrine-DM (POLYTUSSIN DM) 1-5-10 mg/5 mL Syrp Take 5 mLs by mouth every 4 (four) hours as needed.    DULoxetine (CYMBALTA) 30 MG capsule duloxetine 30 mg capsule,delayed release   TAKE ONE CAPSULE BY MOUTH ONCE DAILY    fluticasone propionate (FLONASE) 50 mcg/actuation nasal spray 1 spray (50 mcg total) by Each Nostril route once daily.    gabapentin (NEURONTIN) 600 MG tablet Take 1 tablet (600 mg total) by mouth once daily.    gabapentin (NEURONTIN) 800 MG tablet gabapentin 800 mg tablet   TAKE 1 TABLET BY MOUTH THREE TIMES DAILY    lisinopriL (PRINIVIL,ZESTRIL) 5 MG tablet  lisinopril 5 mg tablet   TAKE 1 TABLET BY MOUTH EVERY DAY    lisinopriL 10 MG tablet lisinopril 10 mg tablet   TAKE ONE TABLET BY MOUTH ONCE DAILY    meclizine (ANTIVERT) 25 mg tablet Take 1 tablet (25 mg total) by mouth 3 (three) times daily as needed for Dizziness.    ondansetron (ZOFRAN-ODT) 4 MG TbDL Take 1 tablet (4 mg total) by mouth every 8 (eight) hours as needed (nausea).    QUEtiapine (SEROQUEL) 50 MG tablet quetiapine 50 mg tablet   TAKE 1 TABLET BY MOUTH EVERY DAY AS DIRECTED    traZODone (DESYREL) 100 MG tablet TAKE 1/2 TO 1 TABLET BY MOUTH EVERY EVENING     Family History       Problem Relation (Age of Onset)    No Known Problems Mother, Father          Tobacco Use    Smoking status: Every Day     Packs/day: 0.50     Types: Cigarettes    Smokeless tobacco: Never   Substance and Sexual Activity    Alcohol use: Yes     Comment: bourbon    Drug use: Not Currently     Types: Marijuana, Cocaine, Anabolic steroids    Sexual activity: Yes     Partners: Female     Review of Systems   Constitutional:  Negative for activity change, appetite change, chills, diaphoresis, fatigue, fever and unexpected weight change.   HENT:  Negative for congestion, dental problem, facial swelling, nosebleeds, sinus pressure, sinus pain, sore throat and trouble swallowing.    Eyes:  Negative for pain and redness.   Respiratory:  Negative for apnea, cough, chest tightness, shortness of breath and wheezing.    Cardiovascular:  Negative for chest pain, palpitations and leg swelling.   Gastrointestinal:  Positive for abdominal pain, nausea and vomiting. Negative for abdominal distention, anal bleeding, blood in stool, constipation and diarrhea.   Endocrine: Negative for polyphagia and polyuria.   Genitourinary:  Negative for decreased urine volume, difficulty urinating, dysuria, frequency, hematuria and urgency.   Musculoskeletal:  Negative for back pain, gait problem, joint swelling, myalgias, neck pain and neck stiffness.   Skin:   Negative for color change, pallor, rash and wound.   Neurological:  Positive for dizziness and weakness. Negative for seizures, syncope, facial asymmetry, speech difficulty, light-headedness, numbness and headaches.        Imbalance   Psychiatric/Behavioral:  Positive for behavioral problems and suicidal ideas. Negative for agitation, confusion, hallucinations and sleep disturbance. The patient is nervous/anxious.    Objective:     Vital Signs (Most Recent):  Temp: 98.6 °F (37 °C) (02/19/23 0923)  Pulse: 82 (02/19/23 2204)  Resp: 18 (02/19/23 2204)  BP: (!) 142/79 (02/19/23 2204)  SpO2: 100 % (02/19/23 2204)   Vital Signs (24h Range):  Temp:  [98.6 °F (37 °C)] 98.6 °F (37 °C)  Pulse:  [] 82  Resp:  [16-30] 18  SpO2:  [99 %-100 %] 100 %  BP: (100-155)/(73-85) 142/79     Weight: 81.6 kg (180 lb)  Body mass index is 24.41 kg/m².    Physical Exam  Constitutional:       General: He is not in acute distress.     Appearance: Normal appearance. He is not ill-appearing, toxic-appearing or diaphoretic.   HENT:      Head: Normocephalic.      Right Ear: External ear normal.      Left Ear: External ear normal.      Nose: No congestion or rhinorrhea.      Mouth/Throat:      Mouth: Mucous membranes are moist.      Pharynx: Oropharynx is clear. No oropharyngeal exudate or posterior oropharyngeal erythema.   Eyes:      Extraocular Movements: Extraocular movements intact.      Conjunctiva/sclera: Conjunctivae normal.      Pupils: Pupils are equal, round, and reactive to light.   Cardiovascular:      Rate and Rhythm: Normal rate and regular rhythm.      Pulses: Normal pulses.      Heart sounds: Normal heart sounds.   Pulmonary:      Effort: Pulmonary effort is normal.      Breath sounds: Normal breath sounds.   Abdominal:      General: Abdomen is flat. Bowel sounds are normal.      Palpations: Abdomen is soft.      Tenderness: There is abdominal tenderness.   Genitourinary:     Rectum: Normal.   Musculoskeletal:         General:  Normal range of motion.      Cervical back: Normal range of motion and neck supple.      Comments: Gaurding left hip   Skin:     General: Skin is warm and dry.      Capillary Refill: Capillary refill takes less than 2 seconds.   Neurological:      Mental Status: He is alert.   Psychiatric:         Mood and Affect: Mood normal.         CRANIAL NERVES     CN III, IV, VI   Pupils are equal, round, and reactive to light.     Significant Labs: All pertinent labs within the past 24 hours have been reviewed.    Significant Imaging: I have reviewed all pertinent imaging results/findings within the past 24 hours.

## 2023-02-20 NOTE — ASSESSMENT & PLAN NOTE
Sodium 120 on admission  Has corrected to 128 within 12 hours  Correcting slowly  Continue IV fluids per Nephrology appreciate their recommendations  Likely a combination of dilutional and hypovolemic hyponatremia

## 2023-02-20 NOTE — PROGRESS NOTES
"[facility] - Emergency Dept  Hospital Medicine  Progress Note    Patient Name: Elder Rosales  MRN: [id number]  Patient Class: IP- Inpatient   Admission Date: 2/19/2023  Length of Stay: 1 days  Attending Physician: Jamal Sy MD  Primary Care Provider: [facility]        Subjective:     Principal Problem:Acute pancreatitis        HPI:  Patient is a 43 yo cauc male with hx ETOH, >20y/pack/d Reformed smoker, vertigo, ADHD, IVDU, Polysubstance abused,treated Hep c . He reports going camping and fishing 3 days ago and experienced vertigo, abdominal pain, N/V, falling and imbalance, hearing voices in his head and talking to himself. He report dark black stools for 4 days however hbg is stable. He states, " he stop use to smoke a pack/d, and drink 12-15 beers a day but stopped a month ago". While in the ED reported suicidal thoughts and depression and asked to speak with a psychiatrist. He was evaluated by telepsych. On assessment patient was fidgeting, difficulty focusing, not staying on track with questions asked and gaurded to left hip and back. He denied drug use however past documentation states iv drug user,and  polysubstance abuse. He denies chest pain, dysuria, fever, and chills.     Hospitalist asked to admit for hyponatermia , pancreatitis, enterocolitis, abd ct abnormality. Nephrology, GI consulted in ED.      Overview/Hospital Course:  No notes on file    No new subjective & objective note has been filed under this hospital service since the last note was generated.      Assessment/Plan:      * Acute pancreatitis  Patient presented with acute pancreatitis, lipase 569  - maintain NPO  - continue IV fluids  - pain control  - monitor for improvement  - advance diet as tolerated  - Protonix IV      Suicidal ideation  Suicide precautions.  ICU  Psych consult  Will need placement  PEC         Hypokalemia  Aggressive replacement        JAIME (acute " kidney injury)  JAIME due to dehydration  Improved with IV fluids  Appreciate nephrology recommendations        Hypertension, essential  Vs Q 4 hrs  Continue home medication  Iv hydralazine 10mg prn sbp >180       Low blood magnesium  Replace as needed      Hyponatremia  Sodium 120 on admission  Has corrected to 128 within 12 hours  Correcting slowly  Continue IV fluids per Nephrology appreciate their recommendations  Likely a combination of dilutional and hypovolemic hyponatremia      Chronic hepatitis C virus infection  Needs outpatient follow-up        VTE Risk Mitigation (From admission, onward)         Ordered     IP VTE LOW RISK PATIENT  Once         02/19/23 1822                Discharge Planning   FLORINA: 2/20/2023     Code Status: Full Code   Is the patient medically ready for discharge?:     Reason for patient still in hospital (select all that apply): Treatment  Discharge Plan A: Home                  Jamal Sy MD  Department of Hospital Medicine   [facility] - Emergency Dept

## 2023-02-20 NOTE — ED NOTES
Resting in bed. Bmp was drawn. Pt requesting something for stomach pain and tramadol for hip pain. MD messaged via secure chat.

## 2023-02-20 NOTE — PROGRESS NOTES
INPATIENT NEPHROLOGY PROGRESS NOTE  [facility] NEPHROLOGY    Elder Rosales  02/20/2023    Reason for consultation:    Hyponatremia, jaime    Chief Complaint:   Chief Complaint   Patient presents with    Hip Pain     LEFT, CAMPING IN WOODS X COUPLE DAYS, STATES FELL COUPLE TIMES    Vomiting    Dizziness     X 2-3 DAYS          History of Present Illness:    Per H and P    42-year-old male with history of anxiety, previous heavy tobacco and alcohol use.  Patient states last alcohol use was proximally 2 months ago.  Used to smoke a pack a day also drank a case of beer daily for several years.  Patient presents emergency department with complaint of generalized upper lower extremity cramping abdominal pain, dizziness weakness with nausea which has been noted over last 3 days.  Patient states that he was camping in the woods and decided that he could not make it out of the woods due to feeling so weak.  Patient states that he was stumbling and had to wait in order to walk out of the roads.  He feels like his vertigo was acting up.  He denies fever, no headache, did endorse dizziness.  Patient decided to come to ER due to persistent symptoms.    2/19  c/o diffuse musculoskeletal pain.   He has pleurisy.  He is very hungry.  His vertigo is better.  No urinary complaints.  He states his stool is black.    2/20  VSS. Na+ up to 126, K+ 2.9.  renal function stable.  On NS, getting aggressive IV electrolyte repletion.  Will add on Mg+ level to last blood draw.  C/o abd pain with burning when he eats or drinks anything.  No other complaints.    Plan of Care:     Hyponatremia likely due to not eating for several days and drinking a lot of water  - serum Na is improving with NS IVFs- continue at current rate until serum Na stabilizes  - advance diet as tolerated  - ordered q6 renal panel    Hypokalemia/HypoMg  - ordered repletion    JAIME  - resolved  - no evidence of ELKIN  - strict ins/outs  - no nsaids or IV  contrast    Hypertension  - controlled on current regimen- ok to resume ACE if BP > 140/90    Thank you for allowing us to participate in this patient's care. We will continue to follow.    Vital Signs:  Temp Readings from Last 3 Encounters:   02/19/23 98.6 °F (37 °C) (Oral)   04/15/22 99.8 °F (37.7 °C) (Oral)   02/04/22 98.2 °F (36.8 °C) (Oral)       Pulse Readings from Last 3 Encounters:   02/20/23 (!) 58   04/15/22 76   02/04/22 105       BP Readings from Last 3 Encounters:   02/20/23 (!) 154/84   04/15/22 133/84   02/04/22 (!) 150/95       Weight:  Wt Readings from Last 3 Encounters:   02/19/23 81.6 kg (180 lb)   04/15/22 83.5 kg (184 lb)   01/17/22 102.1 kg (225 lb)     Medications:  No current facility-administered medications on file prior to encounter.     Current Outpatient Medications on File Prior to Encounter   Medication Sig Dispense Refill    amLODIPine (NORVASC) 5 MG tablet Take 5 mg by mouth once daily.      clonazePAM (KLONOPIN) 1 MG tablet Take 1 mg by mouth daily as needed.      QUEtiapine (SEROQUEL) 100 MG Tab Take 100 mg by mouth every evening.      buprenorphine-naloxone 8-2 (SUBOXONE) 8-2 mg Subl buprenorphine 8 mg-naloxone 2 mg sublingual tablet   DISSOLVE THREE TABLETS UNDER THE TONGUE ONCE DAILY      buprenorphine-naloxone 8-2 mg (SUBOXONE) 8-2 mg Place under the tongue.      buPROPion (WELLBUTRIN XL) 150 MG TB24 tablet Take 150 mg by mouth every morning.      busPIRone (BUSPAR) 10 MG tablet buspirone 10 mg tablet   TAKE ONE TABLET BY MOUTH TWICE DAILY      carvediloL (COREG) 3.125 MG tablet Take 3.125 mg by mouth 2 (two) times daily.      cetirizine (ZYRTEC) 10 MG tablet Take 1 tablet (10 mg total) by mouth once daily. 30 tablet 0    clonazePAM (KLONOPIN) 2 MG Tab Take 2 mg by mouth 2 (two) times daily.      dexchlorphen-phenylephrine-DM (POLYTUSSIN DM) 1-5-10 mg/5 mL Syrp Take 5 mLs by mouth every 4 (four) hours as needed. 120 mL 0    DULoxetine (CYMBALTA) 30 MG capsule duloxetine 30 mg  capsule,delayed release   TAKE ONE CAPSULE BY MOUTH ONCE DAILY      fluticasone propionate (FLONASE) 50 mcg/actuation nasal spray 1 spray (50 mcg total) by Each Nostril route once daily. 15.8 mL 0    gabapentin (NEURONTIN) 600 MG tablet Take 1 tablet (600 mg total) by mouth once daily. 90 tablet 3    gabapentin (NEURONTIN) 800 MG tablet gabapentin 800 mg tablet   TAKE 1 TABLET BY MOUTH THREE TIMES DAILY      lisinopriL (PRINIVIL,ZESTRIL) 5 MG tablet lisinopril 5 mg tablet   TAKE 1 TABLET BY MOUTH EVERY DAY      lisinopriL 10 MG tablet lisinopril 10 mg tablet   TAKE ONE TABLET BY MOUTH ONCE DAILY      meclizine (ANTIVERT) 25 mg tablet Take 1 tablet (25 mg total) by mouth 3 (three) times daily as needed for Dizziness. 20 tablet 0    ondansetron (ZOFRAN-ODT) 4 MG TbDL Take 1 tablet (4 mg total) by mouth every 8 (eight) hours as needed (nausea). 20 tablet 0    QUEtiapine (SEROQUEL) 50 MG tablet quetiapine 50 mg tablet   TAKE 1 TABLET BY MOUTH EVERY DAY AS DIRECTED      traZODone (DESYREL) 100 MG tablet TAKE 1/2 TO 1 TABLET BY MOUTH EVERY EVENING       Scheduled Meds:   amLODIPine  5 mg Oral Daily    cetirizine  10 mg Oral Daily    gabapentin  600 mg Oral TID    QUEtiapine  100 mg Oral QHS     Continuous Infusions:   sodium chloride 0.9% 75 mL/hr at 02/20/23 0404     PRN Meds:.    Review of Systems:  Neg    Physical Exam:    BP (!) 154/84   Pulse (!) 58   Temp 98.6 °F (37 °C) (Oral)   Resp 18   Ht 6' (1.829 m)   Wt 81.6 kg (180 lb)   SpO2 98%   BMI 24.41 kg/m²     Constitutional: nad, aao x 3  Heart: rrr, no m/r/g, wwp, no edema  Lungs: ctab, no w/r/r/c, no lb  Abdomen: s/nt/nd, +BS    Results:  Lab Results   Component Value Date     (L) 02/19/2023    K 2.6 (LL) 02/19/2023    CL 79 (L) 02/19/2023    CO2 21 (L) 02/19/2023    BUN 22 (H) 02/19/2023    CREATININE 1.1 02/19/2023    CALCIUM 8.8 02/19/2023    ANIONGAP 24 (H) 02/19/2023    ESTGFRAFRICA >60 02/04/2022    EGFRNONAA >60 02/04/2022       Lab Results    Component Value Date    CALCIUM 8.8 02/19/2023    PHOS 3.9 10/04/2017       Recent Labs   Lab 02/19/23  1000   WBC 9.08   RBC 5.03   HGB 15.3   HCT 41.1   *   MCV 82   MCH 30.4   MCHC 37.2*            I have personally reviewed pertinent radiological imaging and reports.    Patient care was time spent personally by me on the following activities: > 35 min  Obtaining a history  Examination of patient.  Providing medical care at the patients bedside.  Developing a treatment plan with patient or surrogate and bedside caregivers  Ordering and reviewing laboratory studies, radiographic studies, pulse oximetry.  Ordering and performing treatments and interventions.  Evaluation of patient's response to treatment.  Discussions with consultants while on the unit and immediately available to the patient.  Re-evaluation of the patient's condition.  Documentation in the medical record.     Nicole Bustos MD MPH  [facility]  [address] [place] [address]  [place] [address]  [phone] (p)  [phone] (f)

## 2023-02-20 NOTE — H&P
"[facility] - Emergency Dept  Hospital Medicine  History & Physical    Patient Name: Elder Rosales  MRN: [id number]  Patient Class: IP- Inpatient  Admission Date: 2/19/2023  Attending Physician: Carlos Reis MD   Primary Care Provider: Bravo Evans MD         Patient information was obtained from patient and ER records.     Subjective:     Principal Problem:Hypokalemia    Chief Complaint:   Chief Complaint   Patient presents with    Hip Pain     LEFT, CAMPING IN WOODS X COUPLE DAYS, STATES FELL COUPLE TIMES    Vomiting    Dizziness     X 2-3 DAYS        HPI: Patient is a 41 yo cauc male with hx ETOH, >20y/pack/d Reformed smoker, vertigo, ADHD, IVDU, Polysubstance abused,treated Hep c . He reports going camping and fishing 3 days ago and experienced vertigo, abdominal pain, N/V, falling and imbalance, hearing voices in his head and talking to himself. He report dark black stools for 4 days however hbg is stable. He states, " he stop use to smoke a pack/d, and drink 12-15 beers a day but stopped a month ago". While in the ED reported suicidal thoughts and depression and asked to speak with a psychiatrist. He was evaluated by telepsych. On assessment patient was fidgeting, difficulty focusing, not staying on track with questions asked and gaurded to left hip and back. He denied drug use however past documentation states iv drug user,and  polysubstance abuse. He denies chest pain, dysuria, fever, and chills.     Hospitalist asked to admit for hyponatermia , pancreatitis, enterocolitis, abd ct abnormality. Nephrology, GI consulted in ED.      Past Medical History:   Diagnosis Date    Anxiety     Carpal tunnel syndrome     Hepatitis C virus infection cured after antiviral drug therapy     s/p epclusa, svr12 - 3/2021 (treated / cured)    Shoulder disorder        No past surgical history on file.    Review of patient's allergies indicates:  No Known Allergies    No current " facility-administered medications on file prior to encounter.     Current Outpatient Medications on File Prior to Encounter   Medication Sig    amLODIPine (NORVASC) 5 MG tablet Take 5 mg by mouth once daily.    clonazePAM (KLONOPIN) 1 MG tablet Take 1 mg by mouth daily as needed.    QUEtiapine (SEROQUEL) 100 MG Tab Take 100 mg by mouth every evening.    buprenorphine-naloxone 8-2 (SUBOXONE) 8-2 mg Subl buprenorphine 8 mg-naloxone 2 mg sublingual tablet   DISSOLVE THREE TABLETS UNDER THE TONGUE ONCE DAILY    buprenorphine-naloxone 8-2 mg (SUBOXONE) 8-2 mg Place under the tongue.    buPROPion (WELLBUTRIN XL) 150 MG TB24 tablet Take 150 mg by mouth every morning.    busPIRone (BUSPAR) 10 MG tablet buspirone 10 mg tablet   TAKE ONE TABLET BY MOUTH TWICE DAILY    carvediloL (COREG) 3.125 MG tablet Take 3.125 mg by mouth 2 (two) times daily.    cetirizine (ZYRTEC) 10 MG tablet Take 1 tablet (10 mg total) by mouth once daily.    clonazePAM (KLONOPIN) 2 MG Tab Take 2 mg by mouth 2 (two) times daily.    dexchlorphen-phenylephrine-DM (POLYTUSSIN DM) 1-5-10 mg/5 mL Syrp Take 5 mLs by mouth every 4 (four) hours as needed.    DULoxetine (CYMBALTA) 30 MG capsule duloxetine 30 mg capsule,delayed release   TAKE ONE CAPSULE BY MOUTH ONCE DAILY    fluticasone propionate (FLONASE) 50 mcg/actuation nasal spray 1 spray (50 mcg total) by Each Nostril route once daily.    gabapentin (NEURONTIN) 600 MG tablet Take 1 tablet (600 mg total) by mouth once daily.    gabapentin (NEURONTIN) 800 MG tablet gabapentin 800 mg tablet   TAKE 1 TABLET BY MOUTH THREE TIMES DAILY    lisinopriL (PRINIVIL,ZESTRIL) 5 MG tablet lisinopril 5 mg tablet   TAKE 1 TABLET BY MOUTH EVERY DAY    lisinopriL 10 MG tablet lisinopril 10 mg tablet   TAKE ONE TABLET BY MOUTH ONCE DAILY    meclizine (ANTIVERT) 25 mg tablet Take 1 tablet (25 mg total) by mouth 3 (three) times daily as needed for Dizziness.    ondansetron (ZOFRAN-ODT) 4 MG TbDL Take 1  tablet (4 mg total) by mouth every 8 (eight) hours as needed (nausea).    QUEtiapine (SEROQUEL) 50 MG tablet quetiapine 50 mg tablet   TAKE 1 TABLET BY MOUTH EVERY DAY AS DIRECTED    traZODone (DESYREL) 100 MG tablet TAKE 1/2 TO 1 TABLET BY MOUTH EVERY EVENING     Family History       Problem Relation (Age of Onset)    No Known Problems Mother, Father          Tobacco Use    Smoking status: Every Day     Packs/day: 0.50     Types: Cigarettes    Smokeless tobacco: Never   Substance and Sexual Activity    Alcohol use: Yes     Comment: bourbon    Drug use: Not Currently     Types: Marijuana, Cocaine, Anabolic steroids    Sexual activity: Yes     Partners: Female     Review of Systems   Constitutional:  Negative for activity change, appetite change, chills, diaphoresis, fatigue, fever and unexpected weight change.   HENT:  Negative for congestion, dental problem, facial swelling, nosebleeds, sinus pressure, sinus pain, sore throat and trouble swallowing.    Eyes:  Negative for pain and redness.   Respiratory:  Negative for apnea, cough, chest tightness, shortness of breath and wheezing.    Cardiovascular:  Negative for chest pain, palpitations and leg swelling.   Gastrointestinal:  Positive for abdominal pain, nausea and vomiting. Negative for abdominal distention, anal bleeding, blood in stool, constipation and diarrhea.   Endocrine: Negative for polyphagia and polyuria.   Genitourinary:  Negative for decreased urine volume, difficulty urinating, dysuria, frequency, hematuria and urgency.   Musculoskeletal:  Negative for back pain, gait problem, joint swelling, myalgias, neck pain and neck stiffness.   Skin:  Negative for color change, pallor, rash and wound.   Neurological:  Positive for dizziness and weakness. Negative for seizures, syncope, facial asymmetry, speech difficulty, light-headedness, numbness and headaches.        Imbalance   Psychiatric/Behavioral:  Positive for behavioral problems and suicidal  ideas. Negative for agitation, confusion, hallucinations and sleep disturbance. The patient is nervous/anxious.    Objective:     Vital Signs (Most Recent):  Temp: 98.6 °F (37 °C) (02/19/23 0923)  Pulse: 82 (02/19/23 2204)  Resp: 18 (02/19/23 2204)  BP: (!) 142/79 (02/19/23 2204)  SpO2: 100 % (02/19/23 2204)   Vital Signs (24h Range):  Temp:  [98.6 °F (37 °C)] 98.6 °F (37 °C)  Pulse:  [] 82  Resp:  [16-30] 18  SpO2:  [99 %-100 %] 100 %  BP: (100-155)/(73-85) 142/79     Weight: 81.6 kg (180 lb)  Body mass index is 24.41 kg/m².    Physical Exam  Constitutional:       General: He is not in acute distress.     Appearance: Normal appearance. He is not ill-appearing, toxic-appearing or diaphoretic.   HENT:      Head: Normocephalic.      Right Ear: External ear normal.      Left Ear: External ear normal.      Nose: No congestion or rhinorrhea.      Mouth/Throat:      Mouth: Mucous membranes are moist.      Pharynx: Oropharynx is clear. No oropharyngeal exudate or posterior oropharyngeal erythema.   Eyes:      Extraocular Movements: Extraocular movements intact.      Conjunctiva/sclera: Conjunctivae normal.      Pupils: Pupils are equal, round, and reactive to light.   Cardiovascular:      Rate and Rhythm: Normal rate and regular rhythm.      Pulses: Normal pulses.      Heart sounds: Normal heart sounds.   Pulmonary:      Effort: Pulmonary effort is normal.      Breath sounds: Normal breath sounds.   Abdominal:      General: Abdomen is flat. Bowel sounds are normal.      Palpations: Abdomen is soft.      Tenderness: There is abdominal tenderness.   Genitourinary:     Rectum: Normal.   Musculoskeletal:         General: Normal range of motion.      Cervical back: Normal range of motion and neck supple.      Comments: Gaurding left hip   Skin:     General: Skin is warm and dry.      Capillary Refill: Capillary refill takes less than 2 seconds.   Neurological:      Mental Status: He is alert.   Psychiatric:         Mood  and Affect: Mood normal.         CRANIAL NERVES     CN III, IV, VI   Pupils are equal, round, and reactive to light.     Significant Labs: All pertinent labs within the past 24 hours have been reviewed.    Significant Imaging: I have reviewed all pertinent imaging results/findings within the past 24 hours.    Assessment/Plan:     * Hypokalemia  Unstable  K+2.6   Electrolyte replacement  cmp  Replete and repeat k+ level        Suicidal ideation  Suicide precautions.  ICU  Psych consult        JAIME (acute kidney injury)  cret 1.4  cmp  Strict I,o's  Continue ivf   No nsaids/ASA      Hypertension, essential  Vs Q 4 hrs  Continue home medication  Iv hydralazine 10mg prn sbp >180       Hyponatremia  Unstable;   ua osmalarity, ua osmolity   Nephro consult and managing  Received Ns 130ml/hr       Acute pancreatitis  Due to hyponatremia will Continue IVF @ rate recommend by nephrology  Tramadol q6 hrs prn however monitor cret levels if levels elevate please hold  Npo and advance diet as tolerated  Repeat lipase in am        VTE Risk Mitigation (From admission, onward)         Ordered     IP VTE LOW RISK PATIENT  Once         02/19/23 1822                   TAMRA Alva  Department of Hospital Medicine   [facility] - Emergency Dept

## 2023-02-20 NOTE — ASSESSMENT & PLAN NOTE
Due to hyponatremia will Continue IVF @ rate recommend by nephrology  Tramadol q6 hrs prn however monitor cret levels if levels elevate please hold  Npo and advance diet as tolerated  Repeat lipase in am

## 2023-02-20 NOTE — PLAN OF CARE
[facility] - Emergency Dept  Initial Discharge Assessment       Primary Care Provider: [facility]    Admission Diagnosis: Acute pancreatitis, unspecified complication status, unspecified pancreatitis type [K85.90]    Admission Date: 2/19/2023  Expected Discharge Date: 2/20/2023    Initial assessment at bedside and via chart review.      Discharge Barriers Identified: No family/friends to help    Payor: MEDICAID / Plan: [place] [employer] / Product Type: Managed Medicaid /     Extended Emergency Contact Information  Primary Emergency Contact: Mindy Mcleod  Mobile Phone: [phone]  Relation: Relative   needed? No    Discharge Plan A: Home  Discharge Plan B: [id number] - [place] - [address] AT [facility] [address]  [address] [address]  Phone: [phone] Fax: [phone]      Initial Assessment (most recent)       Adult Discharge Assessment - 02/20/23 1459          Discharge Assessment    Assessment Type Discharge Planning Assessment     Confirmed/corrected address, phone number and insurance Yes     Confirmed Demographics Correct on Facesheet     Source of Information patient     When was your last doctors appointment? --   2023    Reason For Admission Acute Pancreatitis     People in Home alone     Facility Arrived From: home     Do you expect to return to your current living situation? Yes     Do you have help at home or someone to help you manage your care at home? No     Equipment Currently Used at Home none     Readmission within 30 days? No     Patient currently being followed by outpatient case management? No     Do you currently have service(s) that help you manage your care at home? No     Do you take prescription medications? Yes     Do you have prescription coverage? Yes     [place]     Do you have any problems affording  any of your prescribed medications? No     Is the patient taking medications as prescribed? yes     Who is going to help you get home at discharge? friend     How do you get to doctors appointments? car, drives self     Are you on dialysis? No     Do you take coumadin? No     Discharge Plan A Home     Discharge Plan B Psychiatric hospital     DME Needed Upon Discharge  none     Discharge Plan discussed with: Patient     Discharge Barriers Identified No family/friends to help        Physical Activity    On average, how many days per week do you engage in moderate to strenuous exercise (like a brisk walk)? 0 days     On average, how many minutes do you engage in exercise at this level? 0 min        Financial Resource Strain    How hard is it for you to pay for the very basics like food, housing, medical care, and heating? Somewhat hard        Housing Stability    In the last 12 months, was there a time when you were not able to pay the mortgage or rent on time? No     In the last 12 months, how many places have you lived? 1     In the last 12 months, was there a time when you did not have a steady place to sleep or slept in a shelter (including now)? No        Transportation Needs    In the past 12 months, has lack of transportation kept you from medical appointments or from getting medications? No     In the past 12 months, has lack of transportation kept you from meetings, work, or from getting things needed for daily living? No        Food Insecurity    Within the past 12 months, you worried that your food would run out before you got the money to buy more. Sometimes true     Within the past 12 months, the food you bought just didn't last and you didn't have money to get more. Sometimes true        Stress    Do you feel stress - tense, restless, nervous, or anxious, or unable to sleep at night because your mind is troubled all the time - these days? Only a little        Social Connections    In a typical week, how  many times do you talk on the phone with family, friends, or neighbors? More than three times a week     How often do you get together with friends or relatives? Once a week     How often do you attend Cheondoism or Catholic services? Never     Do you belong to any clubs or organizations such as [employer]ondoism groups, unions, fraternal or athletic groups, or school groups? No     How often do you attend meetings of the clubs or organizations you belong to? Never     Are you , , , , never , or living with a partner? Never         Alcohol Use    Q1: How often do you have a drink containing alcohol? Patient refused     Q2: How many drinks containing alcohol do you have on a typical day when you are drinking? Patient refused     Q3: How often do you have six or more drinks on one occasion? Patient refused

## 2023-02-21 PROBLEM — R79.0 LOW BLOOD MAGNESIUM: Status: RESOLVED | Noted: 2023-02-19 | Resolved: 2023-02-21

## 2023-02-21 PROBLEM — D64.9 ANEMIA: Chronic | Status: ACTIVE | Noted: 2023-02-21

## 2023-02-21 PROBLEM — F32.A DEPRESSION: Chronic | Status: ACTIVE | Noted: 2023-02-21

## 2023-02-21 PROBLEM — K22.6 MALLORY-WEISS TEAR: Status: ACTIVE | Noted: 2023-02-21

## 2023-02-21 PROBLEM — N17.9 AKI (ACUTE KIDNEY INJURY): Status: RESOLVED | Noted: 2023-02-19 | Resolved: 2023-02-21

## 2023-02-21 PROBLEM — D69.6 THROMBOCYTOPENIA: Status: ACTIVE | Noted: 2023-02-21

## 2023-02-21 PROBLEM — B18.2 CHRONIC HEPATITIS C VIRUS INFECTION: Status: RESOLVED | Noted: 2017-10-04 | Resolved: 2023-02-21

## 2023-02-21 LAB
ALBUMIN SERPL BCP-MCNC: 3.1 G/DL (ref 3.5–5.2)
ALP SERPL-CCNC: 63 U/L (ref 55–135)
ALT SERPL W/O P-5'-P-CCNC: 55 U/L (ref 10–44)
ANION GAP SERPL CALC-SCNC: 14 MMOL/L (ref 8–16)
ANION GAP SERPL CALC-SCNC: 14 MMOL/L (ref 8–16)
AST SERPL-CCNC: 52 U/L (ref 10–40)
BILIRUB SERPL-MCNC: 1.2 MG/DL (ref 0.1–1)
BUN SERPL-MCNC: 12 MG/DL (ref 6–20)
BUN SERPL-MCNC: 13 MG/DL (ref 6–20)
CALCIUM SERPL-MCNC: 8.7 MG/DL (ref 8.7–10.5)
CALCIUM SERPL-MCNC: 8.9 MG/DL (ref 8.7–10.5)
CHLORIDE SERPL-SCNC: 92 MMOL/L (ref 95–110)
CHLORIDE SERPL-SCNC: 92 MMOL/L (ref 95–110)
CO2 SERPL-SCNC: 24 MMOL/L (ref 23–29)
CO2 SERPL-SCNC: 25 MMOL/L (ref 23–29)
CREAT SERPL-MCNC: 0.7 MG/DL (ref 0.5–1.4)
CREAT SERPL-MCNC: 0.8 MG/DL (ref 0.5–1.4)
ERYTHROCYTE [DISTWIDTH] IN BLOOD BY AUTOMATED COUNT: 14.7 % (ref 11.5–14.5)
EST. GFR  (NO RACE VARIABLE): >60 ML/MIN/1.73 M^2
EST. GFR  (NO RACE VARIABLE): >60 ML/MIN/1.73 M^2
GLUCOSE SERPL-MCNC: 100 MG/DL (ref 70–110)
GLUCOSE SERPL-MCNC: 98 MG/DL (ref 70–110)
HCT VFR BLD AUTO: 32 % (ref 40–54)
HGB BLD-MCNC: 11.8 G/DL (ref 14–18)
LIPASE SERPL-CCNC: 154 U/L (ref 4–60)
MAGNESIUM SERPL-MCNC: 2.2 MG/DL (ref 1.6–2.6)
MCH RBC QN AUTO: 31.3 PG (ref 27–31)
MCHC RBC AUTO-ENTMCNC: 36.9 G/DL (ref 32–36)
MCV RBC AUTO: 85 FL (ref 82–98)
PLATELET # BLD AUTO: 118 K/UL (ref 150–450)
PMV BLD AUTO: 11.7 FL (ref 9.2–12.9)
POTASSIUM SERPL-SCNC: 2.9 MMOL/L (ref 3.5–5.1)
POTASSIUM SERPL-SCNC: 2.9 MMOL/L (ref 3.5–5.1)
POTASSIUM SERPL-SCNC: 3 MMOL/L (ref 3.5–5.1)
PROT SERPL-MCNC: 5.5 G/DL (ref 6–8.4)
RBC # BLD AUTO: 3.77 M/UL (ref 4.6–6.2)
SODIUM SERPL-SCNC: 130 MMOL/L (ref 136–145)
SODIUM SERPL-SCNC: 131 MMOL/L (ref 136–145)
TSH SERPL DL<=0.005 MIU/L-ACNC: 0.71 UIU/ML (ref 0.34–5.6)
WBC # BLD AUTO: 11.86 K/UL (ref 3.9–12.7)

## 2023-02-21 PROCEDURE — 80048 BASIC METABOLIC PNL TOTAL CA: CPT | Performed by: STUDENT IN AN ORGANIZED HEALTH CARE EDUCATION/TRAINING PROGRAM

## 2023-02-21 PROCEDURE — 63600175 PHARM REV CODE 636 W HCPCS: Performed by: STUDENT IN AN ORGANIZED HEALTH CARE EDUCATION/TRAINING PROGRAM

## 2023-02-21 PROCEDURE — 99900035 HC TECH TIME PER 15 MIN (STAT)

## 2023-02-21 PROCEDURE — 84443 ASSAY THYROID STIM HORMONE: CPT | Performed by: STUDENT IN AN ORGANIZED HEALTH CARE EDUCATION/TRAINING PROGRAM

## 2023-02-21 PROCEDURE — 25000003 PHARM REV CODE 250

## 2023-02-21 PROCEDURE — 25000003 PHARM REV CODE 250: Performed by: NURSE PRACTITIONER

## 2023-02-21 PROCEDURE — 25000003 PHARM REV CODE 250: Performed by: INTERNAL MEDICINE

## 2023-02-21 PROCEDURE — 63600175 PHARM REV CODE 636 W HCPCS: Performed by: NURSE PRACTITIONER

## 2023-02-21 PROCEDURE — 83690 ASSAY OF LIPASE: CPT | Performed by: INTERNAL MEDICINE

## 2023-02-21 PROCEDURE — 36415 COLL VENOUS BLD VENIPUNCTURE: CPT | Performed by: INTERNAL MEDICINE

## 2023-02-21 PROCEDURE — 12000002 HC ACUTE/MED SURGE SEMI-PRIVATE ROOM

## 2023-02-21 PROCEDURE — 85027 COMPLETE CBC AUTOMATED: CPT | Performed by: STUDENT IN AN ORGANIZED HEALTH CARE EDUCATION/TRAINING PROGRAM

## 2023-02-21 PROCEDURE — 80053 COMPREHEN METABOLIC PANEL: CPT | Performed by: STUDENT IN AN ORGANIZED HEALTH CARE EDUCATION/TRAINING PROGRAM

## 2023-02-21 PROCEDURE — 94799 UNLISTED PULMONARY SVC/PX: CPT

## 2023-02-21 PROCEDURE — 63600175 PHARM REV CODE 636 W HCPCS: Performed by: INTERNAL MEDICINE

## 2023-02-21 PROCEDURE — 25000242 PHARM REV CODE 250 ALT 637 W/ HCPCS: Performed by: INTERNAL MEDICINE

## 2023-02-21 PROCEDURE — 84132 ASSAY OF SERUM POTASSIUM: CPT | Mod: 91 | Performed by: INTERNAL MEDICINE

## 2023-02-21 PROCEDURE — 36415 COLL VENOUS BLD VENIPUNCTURE: CPT | Performed by: STUDENT IN AN ORGANIZED HEALTH CARE EDUCATION/TRAINING PROGRAM

## 2023-02-21 PROCEDURE — 83735 ASSAY OF MAGNESIUM: CPT | Performed by: INTERNAL MEDICINE

## 2023-02-21 PROCEDURE — 94640 AIRWAY INHALATION TREATMENT: CPT

## 2023-02-21 PROCEDURE — 94761 N-INVAS EAR/PLS OXIMETRY MLT: CPT

## 2023-02-21 PROCEDURE — 99900031 HC PATIENT EDUCATION (STAT)

## 2023-02-21 PROCEDURE — C9113 INJ PANTOPRAZOLE SODIUM, VIA: HCPCS | Performed by: INTERNAL MEDICINE

## 2023-02-21 RX ORDER — ACETAMINOPHEN 325 MG/1
650 TABLET ORAL EVERY 6 HOURS PRN
Status: DISCONTINUED | OUTPATIENT
Start: 2023-02-21 | End: 2023-03-04 | Stop reason: HOSPADM

## 2023-02-21 RX ORDER — CHLORHEXIDINE GLUCONATE ORAL RINSE 1.2 MG/ML
15 SOLUTION DENTAL 2 TIMES DAILY
Status: DISCONTINUED | OUTPATIENT
Start: 2023-02-21 | End: 2023-02-22

## 2023-02-21 RX ORDER — ENOXAPARIN SODIUM 100 MG/ML
40 INJECTION SUBCUTANEOUS
Status: DISCONTINUED | OUTPATIENT
Start: 2023-02-21 | End: 2023-03-04 | Stop reason: HOSPADM

## 2023-02-21 RX ORDER — PANTOPRAZOLE SODIUM 40 MG/10ML
80 INJECTION, POWDER, LYOPHILIZED, FOR SOLUTION INTRAVENOUS ONCE
Status: COMPLETED | OUTPATIENT
Start: 2023-02-21 | End: 2023-02-21

## 2023-02-21 RX ORDER — BUDESONIDE 0.5 MG/2ML
0.5 INHALANT ORAL EVERY 12 HOURS
Status: DISCONTINUED | OUTPATIENT
Start: 2023-02-21 | End: 2023-03-04 | Stop reason: HOSPADM

## 2023-02-21 RX ORDER — ARFORMOTEROL TARTRATE 15 UG/2ML
15 SOLUTION RESPIRATORY (INHALATION) 2 TIMES DAILY
Status: DISCONTINUED | OUTPATIENT
Start: 2023-02-21 | End: 2023-03-04 | Stop reason: HOSPADM

## 2023-02-21 RX ORDER — MUPIROCIN 20 MG/G
OINTMENT TOPICAL 2 TIMES DAILY
Status: DISCONTINUED | OUTPATIENT
Start: 2023-02-21 | End: 2023-02-22

## 2023-02-21 RX ORDER — FLUTICASONE FUROATE AND VILANTEROL 100; 25 UG/1; UG/1
1 POWDER RESPIRATORY (INHALATION) DAILY
Status: DISCONTINUED | OUTPATIENT
Start: 2023-02-21 | End: 2023-02-21

## 2023-02-21 RX ORDER — SODIUM CHLORIDE AND POTASSIUM CHLORIDE 150; 900 MG/100ML; MG/100ML
INJECTION, SOLUTION INTRAVENOUS CONTINUOUS
Status: DISCONTINUED | OUTPATIENT
Start: 2023-02-21 | End: 2023-02-22

## 2023-02-21 RX ORDER — LOPERAMIDE HYDROCHLORIDE 2 MG/1
2 CAPSULE ORAL 4 TIMES DAILY PRN
Status: DISCONTINUED | OUTPATIENT
Start: 2023-02-21 | End: 2023-02-23

## 2023-02-21 RX ADMIN — GABAPENTIN 600 MG: 300 CAPSULE ORAL at 11:02

## 2023-02-21 RX ADMIN — ONDANSETRON 4 MG: 2 INJECTION INTRAMUSCULAR; INTRAVENOUS at 08:02

## 2023-02-21 RX ADMIN — PANTOPRAZOLE SODIUM 8 MG/HR: 40 INJECTION, POWDER, FOR SOLUTION INTRAVENOUS at 11:02

## 2023-02-21 RX ADMIN — CHLORHEXIDINE GLUCONATE 15 ML: 1.2 RINSE ORAL at 08:02

## 2023-02-21 RX ADMIN — PANTOPRAZOLE SODIUM 8 MG/HR: 40 INJECTION, POWDER, FOR SOLUTION INTRAVENOUS at 08:02

## 2023-02-21 RX ADMIN — Medication 6 MG: at 08:02

## 2023-02-21 RX ADMIN — MORPHINE SULFATE 3 MG: 4 INJECTION, SOLUTION INTRAMUSCULAR; INTRAVENOUS at 10:02

## 2023-02-21 RX ADMIN — POTASSIUM CHLORIDE 10 MEQ: 7.46 INJECTION, SOLUTION INTRAVENOUS at 05:02

## 2023-02-21 RX ADMIN — MORPHINE SULFATE 3 MG: 4 INJECTION, SOLUTION INTRAMUSCULAR; INTRAVENOUS at 03:02

## 2023-02-21 RX ADMIN — SODIUM CHLORIDE AND POTASSIUM CHLORIDE: 9; 1.49 INJECTION, SOLUTION INTRAVENOUS at 08:02

## 2023-02-21 RX ADMIN — LOPERAMIDE HYDROCHLORIDE 2 MG: 2 CAPSULE ORAL at 12:02

## 2023-02-21 RX ADMIN — BUDESONIDE INHALATION 0.5 MG: 0.5 SUSPENSION RESPIRATORY (INHALATION) at 07:02

## 2023-02-21 RX ADMIN — MUPIROCIN 1 G: 20 OINTMENT TOPICAL at 08:02

## 2023-02-21 RX ADMIN — ENOXAPARIN SODIUM 40 MG: 100 INJECTION SUBCUTANEOUS at 08:02

## 2023-02-21 RX ADMIN — POTASSIUM CHLORIDE 10 MEQ: 7.46 INJECTION, SOLUTION INTRAVENOUS at 03:02

## 2023-02-21 RX ADMIN — MORPHINE SULFATE 3 MG: 4 INJECTION, SOLUTION INTRAMUSCULAR; INTRAVENOUS at 06:02

## 2023-02-21 RX ADMIN — TRAMADOL HYDROCHLORIDE 50 MG: 50 TABLET, COATED ORAL at 08:02

## 2023-02-21 RX ADMIN — PANTOPRAZOLE SODIUM 80 MG: 40 INJECTION, POWDER, FOR SOLUTION INTRAVENOUS at 11:02

## 2023-02-21 RX ADMIN — MORPHINE SULFATE 3 MG: 4 INJECTION, SOLUTION INTRAMUSCULAR; INTRAVENOUS at 09:02

## 2023-02-21 RX ADMIN — ARFORMOTEROL TARTRATE 15 MCG: 15 SOLUTION RESPIRATORY (INHALATION) at 07:02

## 2023-02-21 RX ADMIN — POTASSIUM CHLORIDE 10 MEQ: 7.46 INJECTION, SOLUTION INTRAVENOUS at 02:02

## 2023-02-21 RX ADMIN — PANTOPRAZOLE SODIUM 8 MG/HR: 40 INJECTION, POWDER, FOR SOLUTION INTRAVENOUS at 03:02

## 2023-02-21 RX ADMIN — SODIUM CHLORIDE: 0.9 INJECTION, SOLUTION INTRAVENOUS at 07:02

## 2023-02-21 RX ADMIN — SODIUM CHLORIDE AND POTASSIUM CHLORIDE: 9; 1.49 INJECTION, SOLUTION INTRAVENOUS at 03:02

## 2023-02-21 RX ADMIN — ONDANSETRON 4 MG: 2 INJECTION INTRAMUSCULAR; INTRAVENOUS at 03:02

## 2023-02-21 RX ADMIN — POTASSIUM CHLORIDE 40 MEQ: 7.46 INJECTION, SOLUTION INTRAVENOUS at 08:02

## 2023-02-21 NOTE — NURSING
Dr Denson notified pts HR drops into 40's intermittently. Pt awake and alert and asymptomatic. Per MD,  just monitor for now.

## 2023-02-21 NOTE — PROGRESS NOTES
[facility]  Adult Nutrition   Progress Note (Initial Assessment)     SUMMARY     Recommendations  Recommendation/Intervention:   1) RD to monitor plans to feed and GI recommendations.   2) Recommend continued monitoring and management of electrolytes (Na 131, K 2.9).   3) Will monitor days NPO, labs, weight, need for nutrition support, and will make recommendations as needed.    Goals:   1) Nutrition to be initiated as medically appropriate.  2) Electrolytes to trend towards normal limits/target ranges.  Nutrition Goal Status: new    Dietitian Rounds Brief  ICU status. Pt is a 43 y/o M admitted for acute pancreatitis. Pt presented to ED w/ c/o generalized upper lower extremity cramping, abdominal pain, dizziness, weakness, and nausea. Pt is w/ past h/x of heavy tobacco and alcohol use. PEC'd d/t suicidal ideations. Pt is currently NPO, orders to check for drew torres tear. Will continue to monitor.      Diet order:   Current Diet Order: NPO     Evaluation of Received Nutrient/Fluid Intake  Energy Calories Required: not meeting needs  Protein Required: not meeting needs  Fluid Required: not meeting needs  Tolerance: other (see comments) (NPO)     % Intake of Estimated Energy Needs: 0%  % Meal Intake: NPO      Intake/Output Summary (Last 24 hours) at 2/21/2023 1149  Last data filed at 2/21/2023 0534  Gross per 24 hour   Intake 1710 ml   Output 395 ml   Net 1315 ml        Anthropometrics  Temp: 98.2 °F (36.8 °C)  Height Method: Stated  Height: 6' (182.9 cm)  Height (inches): 72 in  Weight Method: Bed Scale  Weight: 84.2 kg (185 lb 10 oz)  Weight (lb): 185.63 lb  Ideal Body Weight (IBW), Male: 178 lb  % Ideal Body Weight, Male (lb): 101.12 %  BMI (Calculated): 25.2  BMI Grade: 25 - 29.9 - overweight       Estimated/Assessed Needs  Weight Used For Calorie Calculations: 84 kg (185 lb 3 oz)  Energy Calorie Requirements (kcal): 2100-250 (25-30 kcal/kg)  Energy Need Method: Kcal/kg  Protein Requirements:   (1.0-1.5 gm/kg)  Weight Used For Protein Calculations: 84 kg (185 lb 3 oz)  Fluid Requirements (mL): 2940 (35 ml/kg)     RDA Method (mL): 2100       Reason for Assessment  Reason For Assessment: other (see comments) (ICU admit)  Diagnosis: other (see comments) (Acute pancreatitis)  Relevant Medical History: Polysubstance dependence, IVDU, hep C    Nutrition/Diet History  Food Allergies: NKFA  Factors Affecting Nutritional Intake: NPO, altered gastrointestinal function    Nutrition Risk Screen  Nutrition Risk Screen: no indicators present       Altered Skin Integrity 02/21/23 0701 Right anterior Hip #1 Full thickness tissue loss. Base is covered by slough and/or eschar in the wound bed-Wound Image: Images linked       Altered Skin Integrity 02/21/23 0701 Left anterior Hip #2 Full thickness tissue loss. Base is covered by slough and/or eschar in the wound bed-Wound Image: Images linked  MST Score: 0  Have you recently lost weight without trying?: No  Weight loss score: 0  Have you been eating poorly because of a decreased appetite?: No  Appetite score: 0       Weight History:  Wt Readings from Last 5 Encounters:   02/21/23 84.2 kg (185 lb 10 oz)   04/15/22 83.5 kg (184 lb)   01/17/22 102.1 kg (225 lb)   11/06/21 102.1 kg (225 lb)   11/18/20 89.8 kg (198 lb)        Medications:Pertinent Medications Reviewed  Scheduled Meds:   amLODIPine  5 mg Oral Daily    gabapentin  600 mg Oral TID    QUEtiapine  100 mg Oral QHS     Continuous Infusions:   0/9% NACL & POTASSIUM CHLORIDE 20 MEQ/L 100 mL/hr at 02/21/23 0839    pantoprazole (PROTONIX) IV infusion 8 mg/hr (02/21/23 1118)     PRN Meds:.calcium gluconate IVPB, calcium gluconate IVPB, calcium gluconate IVPB, clonazePAM, influenza, loperamide, magnesium sulfate IVPB, magnesium sulfate IVPB, melatonin, morphine, ondansetron, potassium chloride **AND** potassium chloride **AND** potassium chloride, sodium chloride 0.9%, sodium phosphate IVPB, sodium phosphate IVPB,  sodium phosphate IVPB, traMADoL    Labs: Pertinent Labs Reviewed  Clinical Chemistry:  Recent Labs   Lab 02/19/23  1000 02/19/23  1157 02/21/23  0511   *   < > 131*   K 2.6*   < > 2.9*   CL 73*   < > 92*   CO2 20*   < > 25   *   < > 100   BUN 25*   < > 12   CREATININE 1.4   < > 0.7   CALCIUM 9.5   < > 8.7   PROT 7.4  --  5.5*   ALBUMIN 4.0  --  3.1*   BILITOT 2.0*  --  1.2*   ALKPHOS 71  --  63   *  --  52*   ALT 99*  --  55*   ANIONGAP 27*   < > 14   MG 1.8   < > 2.2   LIPASE 569*  --  154*    < > = values in this interval not displayed.     CBC:   Recent Labs   Lab 02/21/23  0511   WBC 11.86   RBC 3.77*   HGB 11.8*   HCT 32.0*   *   MCV 85   MCH 31.3*   MCHC 36.9*     Cardiac Profile:  Recent Labs   Lab 02/19/23  1000   *   *       Monitor and Evaluation  Food and Nutrient Intake: energy intake, food and beverage intake  Food and Nutrient Adminstration: diet order  Knowledge/Beliefs/Attitudes: food and nutrition knowledge/skill  Physical Activity and Function: nutrition-related ADLs and IADLs  Anthropometric Measurements: weight, weight change, body mass index  Biochemical Data, Medical Tests and Procedures: electrolyte and renal panel, gastrointestinal profile, glucose/endocrine profile  Nutrition-Focused Physical Findings: overall appearance     Nutrition Risk  Level of Risk/Frequency of Follow-up: high     Nutrition Follow-Up  RD Follow-up?: Yes      Ambika Lott RD 02/21/2023 11:49 AM

## 2023-02-21 NOTE — PROGRESS NOTES
Automatic Inhaler to Nebulizer Interchange    fluticasone/vilanterol (Breo Ellipta) 100 mcg/25 mcg changed to budesonide 0.5 mg twice daily AND arformoterol 15 mcg twice daily per [facility] Automatic Therapeutic Substitutions Protocol.    Please contact pharmacy at extension [phone] with any questions.     Thank you,   Kalie Ly

## 2023-02-21 NOTE — SUBJECTIVE & OBJECTIVE
Interval History:  See hospital course.  Does report intermittent lightheadedness, no nausea or vomiting, some epigastric discomfort, had 1 bowel movement this morning.  Reporting some numbness in his feet.  States yesterday he had suicidal ideation but none today, denies hallucination.  T-max 99.2°.  On room air.  Labs with hemoglobin 11.8, sodium 131, potassium 2.9.  Urine drug screen negative.  Ethanol level negative.  Discussed with patient.  Discussed with ICU RN.    Review of Systems   Constitutional:  Negative for fever.   Respiratory:  Positive for shortness of breath.    Gastrointestinal:  Positive for abdominal pain.   Genitourinary:  Negative for difficulty urinating.   Neurological:  Positive for light-headedness and numbness.   Psychiatric/Behavioral:  Negative for hallucinations.    Objective:     Vital Signs (Most Recent):  Temp: 98.2 °F (36.8 °C) (02/21/23 0730)  Pulse: 69 (02/21/23 1109)  Resp: (!) 21 (02/21/23 1109)  BP: 123/71 (02/21/23 1101)  SpO2: 99 % (02/21/23 1109)   Vital Signs (24h Range):  Temp:  [98.1 °F (36.7 °C)-99.2 °F (37.3 °C)] 98.2 °F (36.8 °C)  Pulse:  [53-74] 69  Resp:  [15-27] 21  SpO2:  [97 %-100 %] 99 %  BP: (109-166)/(62-97) 123/71     Weight: 84.2 kg (185 lb 10 oz)  Body mass index is 25.18 kg/m².    Intake/Output Summary (Last 24 hours) at 2/21/2023 1158  Last data filed at 2/21/2023 0534  Gross per 24 hour   Intake 1710 ml   Output 395 ml   Net 1315 ml      Physical Exam  Vitals and nursing note reviewed.   Constitutional:       General: He is not in acute distress.     Appearance: He is not ill-appearing.      Comments: Lying in bed, cooperative   HENT:      Head: Normocephalic and atraumatic.      Mouth/Throat:      Mouth: Mucous membranes are moist.   Cardiovascular:      Rate and Rhythm: Normal rate and regular rhythm.   Pulmonary:      Effort: No respiratory distress.      Breath sounds: No wheezing.      Comments: On room air  Abdominal:      General: There is no  distension.      Palpations: Abdomen is soft.      Tenderness: There is no guarding.   Genitourinary:     Comments: Urinal at bedside  Skin:     General: Skin is warm and dry.   Neurological:      Mental Status: He is alert and oriented to person, place, and time.      Comments: Speech intact, interactive, moving all 4 extremities   Psychiatric:      Comments: Denies any current suicidal ideation, states he did have yesterday, no hallucination       Significant Labs: BMP:   Recent Labs   Lab 02/21/23  0511      *   K 2.9*   CL 92*   CO2 25   BUN 12   CREATININE 0.7   CALCIUM 8.7   MG 2.2     CBC:   Recent Labs   Lab 02/21/23  0511   WBC 11.86   HGB 11.8*   HCT 32.0*   *     CMP:   Recent Labs   Lab 02/20/23  1835 02/21/23  0012 02/21/23  0511   * 130* 131*   K 2.8* 3.0* 2.9*   CL 89* 92* 92*   CO2 26 24 25   GLU 92 98 100   BUN 15 13 12   CREATININE 0.8 0.8 0.7   CALCIUM 9.5 8.9 8.7   PROT  --   --  5.5*   ALBUMIN  --   --  3.1*   BILITOT  --   --  1.2*   ALKPHOS  --   --  63   AST  --   --  52*   ALT  --   --  55*   ANIONGAP 15 14 14     Cardiac Markers: No results for input(s): CKMB, MYOGLOBIN, BNP, TROPISTAT in the last 48 hours.  Magnesium:   Recent Labs   Lab 02/19/23  1801 02/20/23  0752 02/21/23  0511   MG 1.7 1.8 2.2       Significant Imaging: I have reviewed all pertinent imaging results/findings within the past 24 hours.    X-Ray Hip 2 or 3 views Left (with Pelvis when performed)    Result Date: 2/19/2023  Left hip with AP pelvis CLINICAL DATA: Trauma, pain FINDINGS: 3 views are negative for fracture, dislocation, or osseous destructive lesion. No significant arthritic change is identified. Soft tissues are unremarkable. IMPRESSION: 1. No radiographic abnormalities. Electronically signed by:  Naren Mendez MD  2/19/2023 10:42 AM [facility] Workstation: [id number]    CT Head Without Contrast    Result Date: 2/19/2023  CT HEAD WITHOUT CONTRAST CMS MANDATED QUALITY DATA - CT RADIATION   436 All CT scans at this facility utilize dose modulation, iterative reconstruction, and/or weight based dosing when appropriate to reduce radiation dose to as low as reasonably achievable Clinical data: Dizziness. Comparison to December 2018. FINDINGS: Noninfusion images were obtained from the skull base to the vertex. There is no intracranial mass, hemorrhage, or midline shift. Ventricles and sulci are normal. There are no pathologic extra-axial fluid collections. There is no evidence of ischemic change or edema. Cerebellum and brainstem are normal. The calvarium is intact.Mild mucoperiosteal thickening in the left maxillary sinus is consistent with chronic sinusitis, improved compared to the prior study. IMPRESSION: 1. Normal CT appearance of the brain. 2. Mild chronic left maxillary sinusitis, improved compared to 2018. Electronically signed by:  Naren Mendez MD  2/19/2023 2:31 PM [facility] Workstation: [id number]    CT Chest Without Contrast    Result Date: 2/19/2023  CT chest without contrast CLINICAL DATA: Pneumomediastinum CMS MANDATED QUALITY DATA - CT RADIATION  436 All CT scans at this facility utilize dose modulation, iterative reconstruction, and/or weight based dosing when appropriate to reduce radiation dose to as low as reasonably achievable. Findings: Thin section axial noncontrast images were obtained from the thoracic inlet to the adrenal glands, following the administration of water-soluble oral contrast. Again noted are findings of pneumomediastinum, with multiple bubbles of mediastinal air adjacent to the esophagus and left main bronchus. There is no contrast extravasation to indicate esophageal perforation. No pleural effusions are demonstrated. There is mild circumferential wall thickening of the lower two thirds of the thoracic esophagus suggesting possible esophagitis. Correlate clinically. Heart size is normal. The thoracic aorta is normal in caliber. No mediastinal lymphadenopathy is  identified. There are no pulmonary infiltrates or mass lesions. No pneumothorax is demonstrated. 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 IMPRESSION: 1. There is no identifiable extravasation of ingested water-soluble oral contrast material to indicate esophageal perforation. There is no pathologic mediastinal fluid collection or pleural effusion. If there is persistent clinical suspicion of esophageal perforation, endoscopy should be considered. 2. Mild pneumomediastinum, with air primarily around the left main bronchus and in a paraesophageal distribution Electronically signed by:  Naren Mendez MD  2/19/2023 4:23 PM [facility] [[email or web address]] Workstation: [id number]    CT Abdomen Pelvis With Contrast    Result Date: 2/19/2023  CT ABDOMEN AND PELVIS WITH CONTRAST HISTORY: Abdominal pain CMS MANDATED QUALITY DATA - CT RADIATION  436 All CT scans at this facility utilize dose modulation, iterative reconstruction, and/or weight based dosing when appropriate to reduce radiation dose to as low as reasonably achievable FINDINGS: Post infusion images were obtained from the lung bases to the pubic symphysis. 100 cc nonionic contrast was administered for the examination. The lung bases are unremarkable. Focal pneumomediastinum is noted, with multiple air bubbles noted posterior to the esophagus. There is mild circumferential wall thickening of the esophagus distally which could be on the basis of underdistention or esophagitis. There is marked hepatic steatosis. The gallbladder is mildly dilated. Gallbladder lumen is hyperdense in appearance, likely on the basis of hyperdense bile. Biliary tree is nondilated. The spleen has a normal appearance. There is subtle fat stranding adjacent to the pancreatic tail. Correlate with amylase and lipase levels. No pancreatic mass is identified. The adrenal glands are normal. The bilateral kidneys are unremarkable. The abdominal aorta is normal in caliber. The colon  appears slightly thick-walled throughout its course, however this may be related to incomplete distention. Slightly thick-walled appearance of ileal loops is also noted. No free air or free fluid is identified. Images of the pelvis demonstrate a normal urinary bladder. There is no free fluid or lymphadenopathy. No acute osseous abnormalities are identified. IMPRESSION: 1. Subtle fat stranding adjacent to the pancreatic tail suspicious for early or mild pancreatitis. Correlate with amylase and lipase levels. 2. Focal pneumomediastinum, with multiple air bubbles noted dorsal to the distal esophagus. The esophagus is slightly thick-walled which could be on the basis of esophagitis or incomplete distention. While esophageal perforation is felt unlikely, if this is a strong clinical concern, CT chest following water-soluble oral contrast administration could be performed. 3. Severe hepatic steatosis. Hyperdense appearance of the gallbladder is likely on the basis of hyperdense bile. 4. Slightly thick-walled appearance of the colon and of the ileum. Correlate clinically for enterocolitis. Liver disease/hypoalbuminemia could also give this same appearance, and wall thickening of the colon may be in part related to incomplete distention. 5. Additional findings as above. Electronically signed by:  Naren Mendez MD  2/19/2023 2:46 PM [facility] Workstation: [id number]    X-Ray Chest AP Portable    Result Date: 2/19/2023  Chest single view Clinical data:Chest pain. Comparison to January 2022. FINDINGS: AP view of the chest demonstrates no cardiac, pulmonary, or osseous abnormalities. IMPRESSION: 1. Normal chest single view. Electronically signed by:  Naren Mendez MD  2/19/2023 10:41 AM [facility] Workstation: [id number]    US Abdomen Limited    Result Date: 2/19/2023  Abdominal ultrasound Limited CLINICAL DATA: Hyperbilirubinemia FINDINGS: Sonographic assessment targeted to the right upper quadrant was performed. The pancreas and  midline retroperitoneum are obscured by bowel gas. Visualization of the liver is limited due to poor penetration. The liver is echogenic compatible with fatty infiltration. No mass is identified. Hepatopedal flow is noted within the portal vein. The gallbladder is mildly distended. No gallstones are identified. Common bile duct measures 5 mm. The right kidney is normal in appearance. No free fluid is identified. IMPRESSION: 1. Exam is limited as noted above. 2. Fatty infiltration of the liver. 3. No evidence of cholecystitis or obstruction. Electronically signed by:  Naren Mendez MD  2/19/2023 5:11 PM [facility] Workstation: [id number]

## 2023-02-21 NOTE — NURSING
"Pt reports that he does not take Gabapentin 3 times a day. Pt states, "I usually take it at night and sometimes in the morning." Notified Dr. Desnon.   "

## 2023-02-21 NOTE — HOSPITAL COURSE
Patient with a history of alcoholism, smoker, vertigo, history of IVDU/polysubstance abuse but denies recent, treated hepatitis-C.  States he went out fishing/camping, believes he had an attack of vertigo, states subsequent day he was generally weak, it took him 4 days to come out of the camp.  States he was having dark stool.  He presented to the ED, expressed suicidal ideation on background history of depression, he was seen by tele psych Dr Fountain, PEC, recommends Seroquel 100 q.h.s., Remeron 15 mg q.h.s. Admission labs with hyponatremia 120 with severe hypokalemia, transaminitis with elevation of lipase.  He was admitted to the ICU, GI and Nephrology consultation, IV fluid hydration, continuous PPI therapy, esophagram.  On 02/22, esophagram with no evidence of esophageal perforation or tears, starting liquid diet and advancing as tolerated, once medically cleared will need inpatient psychiatric placement.  On 02/22 C diff is positive and started on oral vancomycin. He reports bilateral severe burning feet pain, he is on gabapentin 800 mg t.i.d. at home, states pain is worsening, ultrasound legs no DVT, MRI lumbosacral spine no evidence of nerve compromise, neurology consulted, discussed gabapentin, Lyrica, Cymbalta, patient is requesting IV narcotic medications despite counseling.  On 02/25 did have intermittent fevers up to 100.4, fever workup repeated including UA (negative), chest x-ray with possible lower lung changes and empirically started on Zosyn, blood culture no growth to date, rapid COVID negative, procalcitonin 0.78.  On 2/26 continues with irritability and requesting IV narcotics despite counseling on feet pain, seen and examined with RN and sitter in room, using profanity, transfer care to another hospitalist as discussed with patient as feels pain not being appropriately managed

## 2023-02-21 NOTE — PLAN OF CARE
Plan of care reviewed with patient. Safety precautions maintained. Pt remains free from injury. One on one continuous monitoring. Pt denies SI. Pain controlled with prn medication. C/o nausea x1, relief with prn zofran. Ambulates stand by assist.  Voids in urinal. Strict I&O. IVF continued. K replaced. Protonix gtt initiated. Wound care consulted; photos of wounds in chart. Sinus tayo to nsr on tele monitor. Remains NPO. Esophagram tomorrow. Vital signs stable. Afebrile. AAOx4.

## 2023-02-21 NOTE — PROGRESS NOTES
INPATIENT NEPHROLOGY PROGRESS NOTE  [facility] NEPHROLOGY    Elder Rosales  02/21/2023    Reason for consultation:    Hyponatremia, jaime    Chief Complaint:   Chief Complaint   Patient presents with    Hip Pain     LEFT, CAMPING IN WOODS X COUPLE DAYS, STATES FELL COUPLE TIMES    Vomiting    Dizziness     X 2-3 DAYS          History of Present Illness:    Per H and P    42-year-old male with history of anxiety, previous heavy tobacco and alcohol use.  Patient states last alcohol use was proximally 2 months ago.  Used to smoke a pack a day also drank a case of beer daily for several years.  Patient presents emergency department with complaint of generalized upper lower extremity cramping abdominal pain, dizziness weakness with nausea which has been noted over last 3 days.  Patient states that he was camping in the woods and decided that he could not make it out of the woods due to feeling so weak.  Patient states that he was stumbling and had to wait in order to walk out of the roads.  He feels like his vertigo was acting up.  He denies fever, no headache, did endorse dizziness.  Patient decided to come to ER due to persistent symptoms.    2/19  c/o diffuse musculoskeletal pain.   He has pleurisy.  He is very hungry.  His vertigo is better.  No urinary complaints.  He states his stool is black.    2/20  VSS. Na+ up to 126, K+ 2.9.  renal function stable.  On NS, getting aggressive IV electrolyte repletion.  Will add on Mg+ level to last blood draw.  C/o abd pain with burning when he eats or drinks anything.  No other complaints.  2/21 plan for EGD tomorrow- starting PPI gtt    Plan of Care:     Hyponatremia likely due to not eating for several days and drinking a lot of water  - serum Na is improving with NS IVFs- can stop NS  - advance diet as tolerated    Hypokalemia/HypoMg  - getting KCl gtt- Mg replete  - ordered q6 serum K until hypoK resolved    JAIME  - resolved  - no evidence of ELKIN  - strict ins/outs  -  no nsaids or IV contrast    Hypertension  - controlled on current regimen- ok to resume ACE if BP > 140/90    Thank you for allowing us to participate in this patient's care. We will continue to follow.    Vital Signs:  Temp Readings from Last 3 Encounters:   02/21/23 98.2 °F (36.8 °C) (Oral)   04/15/22 99.8 °F (37.7 °C) (Oral)   02/04/22 98.2 °F (36.8 °C) (Oral)       Pulse Readings from Last 3 Encounters:   02/21/23 (!) 58   04/15/22 76   02/04/22 105       BP Readings from Last 3 Encounters:   02/21/23 135/86   04/15/22 133/84   02/04/22 (!) 150/95       Weight:  Wt Readings from Last 3 Encounters:   02/21/23 84.2 kg (185 lb 10 oz)   04/15/22 83.5 kg (184 lb)   01/17/22 102.1 kg (225 lb)     Medications:  No current facility-administered medications on file prior to encounter.     Current Outpatient Medications on File Prior to Encounter   Medication Sig Dispense Refill    amLODIPine (NORVASC) 5 MG tablet Take 5 mg by mouth once daily.      clonazePAM (KLONOPIN) 1 MG tablet Take 1 mg by mouth daily as needed.      QUEtiapine (SEROQUEL) 100 MG Tab Take 100 mg by mouth every evening.      buprenorphine-naloxone 8-2 (SUBOXONE) 8-2 mg Subl buprenorphine 8 mg-naloxone 2 mg sublingual tablet   DISSOLVE THREE TABLETS UNDER THE TONGUE ONCE DAILY      buprenorphine-naloxone 8-2 mg (SUBOXONE) 8-2 mg Place under the tongue.      buPROPion (WELLBUTRIN XL) 150 MG TB24 tablet Take 150 mg by mouth every morning.      busPIRone (BUSPAR) 10 MG tablet buspirone 10 mg tablet   TAKE ONE TABLET BY MOUTH TWICE DAILY      carvediloL (COREG) 3.125 MG tablet Take 3.125 mg by mouth 2 (two) times daily.      cetirizine (ZYRTEC) 10 MG tablet Take 1 tablet (10 mg total) by mouth once daily. 30 tablet 0    clonazePAM (KLONOPIN) 2 MG Tab Take 2 mg by mouth 2 (two) times daily.      dexchlorphen-phenylephrine-DM (POLYTUSSIN DM) 1-5-10 mg/5 mL Syrp Take 5 mLs by mouth every 4 (four) hours as needed. 120 mL 0    DULoxetine (CYMBALTA) 30 MG  capsule duloxetine 30 mg capsule,delayed release   TAKE ONE CAPSULE BY MOUTH ONCE DAILY      fluticasone propionate (FLONASE) 50 mcg/actuation nasal spray 1 spray (50 mcg total) by Each Nostril route once daily. 15.8 mL 0    gabapentin (NEURONTIN) 600 MG tablet Take 1 tablet (600 mg total) by mouth once daily. 90 tablet 3    gabapentin (NEURONTIN) 800 MG tablet gabapentin 800 mg tablet   TAKE 1 TABLET BY MOUTH THREE TIMES DAILY      lisinopriL (PRINIVIL,ZESTRIL) 5 MG tablet lisinopril 5 mg tablet   TAKE 1 TABLET BY MOUTH EVERY DAY      lisinopriL 10 MG tablet lisinopril 10 mg tablet   TAKE ONE TABLET BY MOUTH ONCE DAILY      meclizine (ANTIVERT) 25 mg tablet Take 1 tablet (25 mg total) by mouth 3 (three) times daily as needed for Dizziness. 20 tablet 0    ondansetron (ZOFRAN-ODT) 4 MG TbDL Take 1 tablet (4 mg total) by mouth every 8 (eight) hours as needed (nausea). 20 tablet 0    QUEtiapine (SEROQUEL) 50 MG tablet quetiapine 50 mg tablet   TAKE 1 TABLET BY MOUTH EVERY DAY AS DIRECTED      traZODone (DESYREL) 100 MG tablet TAKE 1/2 TO 1 TABLET BY MOUTH EVERY EVENING       Scheduled Meds:   amLODIPine  5 mg Oral Daily    cetirizine  10 mg Oral Daily    gabapentin  600 mg Oral TID    QUEtiapine  100 mg Oral QHS     Continuous Infusions:   0/9% NACL & POTASSIUM CHLORIDE 20 MEQ/L 100 mL/hr at 02/21/23 0839     PRN Meds:.    Review of Systems:  Neg    Physical Exam:    /86   Pulse (!) 58   Temp 98.2 °F (36.8 °C) (Oral)   Resp 19   Ht 6' (1.829 m)   Wt 84.2 kg (185 lb 10 oz)   SpO2 99%   BMI 25.18 kg/m²     Constitutional: nad, aao x 3  Heart: rrr, no m/r/g, wwp, no edema  Lungs: ctab, no w/r/r/c, no lb  Abdomen: s/nt/nd, +BS    Results:  Lab Results   Component Value Date     (L) 02/21/2023    K 2.9 (LL) 02/21/2023    CL 92 (L) 02/21/2023    CO2 25 02/21/2023    BUN 12 02/21/2023    CREATININE 0.7 02/21/2023    CALCIUM 8.7 02/21/2023    ANIONGAP 14 02/21/2023    ESTGFRAFRICA >60 02/04/2022    EGFRNONAA  >60 02/04/2022       Lab Results   Component Value Date    CALCIUM 8.7 02/21/2023    PHOS 3.9 10/04/2017       Recent Labs   Lab 02/21/23  0511   WBC 11.86   RBC 3.77*   HGB 11.8*   HCT 32.0*   *   MCV 85   MCH 31.3*   MCHC 36.9*            I have personally reviewed pertinent radiological imaging and reports.    Patient care was time spent personally by me on the following activities: > 35 min  Obtaining a history  Examination of patient.  Providing medical care at the patients bedside.  Developing a treatment plan with patient or surrogate and bedside caregivers  Ordering and reviewing laboratory studies, radiographic studies, pulse oximetry.  Ordering and performing treatments and interventions.  Evaluation of patient's response to treatment.  Discussions with consultants while on the unit and immediately available to the patient.  Re-evaluation of the patient's condition.  Documentation in the medical record.     Nicole Bustos MD MPH  [facility] [address] [address]  [phone] (p)  [phone] (f)

## 2023-02-21 NOTE — NURSING
1751 Pt arrived from emergency department on stretcher.  Pt CEC d for suicidal ideation.  Sitter with pt at bedside.  Pt asking for water and food.  Pt instructed that he has order to check for esophagus Camila Portillo Tear.  (Esophagram).

## 2023-02-21 NOTE — CONSULTS
"[facility] - Emergency Dept  Gastroenterology  Progress Note    Patient Name: Elder Rosales  MRN: [id number]  Admission Date: 2/19/2023  Hospital Length of Stay: 2 days  Code Status: Full Code   Attending Provider: Nayely Denson MD   Consulting Provider: Bebe Hernandez MD  Primary Care Physician: [facility]  Principal Problem:Acute pancreatitis    Consults  Subjective:     HPI: Patient is a 43 yo cauc male with hx ETOH, >20y/pack/d Reformed smoker, vertigo, ADHD, IVDU, Polysubstance abused,treated Hep c . He reports going camping and fishing 3 days ago and experienced vertigo, abdominal pain, N/V, falling and imbalance, hearing voices in his head and talking to himself. He report dark black stools for 4 days however hbg is stable. He states, " he stop use to smoke a pack/d, and drink 12-15 beers a day but stopped a month ago". While in the ED reported suicidal thoughts and depression and asked to speak with a psychiatrist. He was evaluated by telepsych. On assessment patient was fidgeting, difficulty focusing, not staying on track with questions asked and gaurded to left hip and back. He denied drug use however past documentation states iv drug user,and  polysubstance abuse. He denies chest pain, dysuria, fever, and chills.      Hospitalist asked to admit for hyponatermia , pancreatitis, enterocolitis, abd ct abnormality. Nephrology, GI consulted in ED.    Patient reports he was out at a camp on drinking he is had excessive vertigo he tried to get up and then he ended up falling bruising his hip his abdomen pelvis on and with that subsequent pain he started having nausea and vomiting with vomiting for 3-4 days as that was very dark so he could not tell if the vomitus was bloody could not do not whether he was having any melena.  On admission today he says that his pain in his abdomen is resolved.  CT imaging was reviewed with the patient "     Patient denies any baseline esophageal dysphagia.  Denies any prior episodes of on hematemesis and he denies any prior endoscopy    2/21/23 - patient was given gabapentin and amlodipine yesterday with sips of water without any complicating events.  Esophagram has been deferred until Wednesday.  Denies any abdominal pain and complains of hunger.  Patient is passing gas no bowel movements    Past Medical History:   Diagnosis Date    Anxiety     Carpal tunnel syndrome     Hepatitis C virus infection cured after antiviral drug therapy     s/p epclusa, svr12 - 3/2021 (treated / cured)    Shoulder disorder        No past surgical history on file.    Review of patient's allergies indicates:  No Known Allergies  Family History       Problem Relation (Age of Onset)    No Known Problems Mother, Father          Tobacco Use    Smoking status: Every Day     Packs/day: 0.50     Types: Cigarettes    Smokeless tobacco: Never   Substance and Sexual Activity    Alcohol use: Yes     Comment: bourbon    Drug use: Not Currently     Types: Marijuana, Cocaine, Anabolic steroids    Sexual activity: Yes     Partners: Female     Review of Systems  Objective:     Vital Signs (Most Recent):  Temp: 98.2 °F (36.8 °C) (02/21/23 0730)  Pulse: (!) 58 (02/21/23 0901)  Resp: 19 (02/21/23 0932)  BP: 135/86 (02/21/23 0901)  SpO2: 99 % (02/21/23 0901) Vital Signs (24h Range):  Temp:  [98.1 °F (36.7 °C)-99.2 °F (37.3 °C)] 98.2 °F (36.8 °C)  Pulse:  [53-74] 58  Resp:  [15-27] 19  SpO2:  [98 %-100 %] 99 %  BP: (109-166)/(62-97) 135/86     Weight: 84.2 kg (185 lb 10 oz) (02/21/23 0532)  Body mass index is 25.18 kg/m².      Intake/Output Summary (Last 24 hours) at 2/21/2023 1024  Last data filed at 2/21/2023 0534  Gross per 24 hour   Intake 1910 ml   Output 395 ml   Net 1515 ml         Lines/Drains/Airways       Peripheral Intravenous Line  Duration                  Peripheral IV - Single Lumen 02/19/23 0959 20 G Left Antecubital 2 days          Peripheral IV - Single Lumen 02/20/23 0752 20 G Right Antecubital 1 day                    Physical Exam  Physical Exam:  General- Patient alert and oriented x3 in NAD  HEENT- PERRLA, EOMI, OP clear, MMM  Neck- No JVD, Lymphadenopathy, Thyromegaly  CV- Regular rate and rhythm, No Murmur/nima/rubs  Resp- Lungs CTA Bilaterally, No increased WOB  GI- Non tender/non-distended, BS normoactive x4 quads, no HSM  Extrem- No cyanosis, clubbing, edema. Pulses 2+ and symmetric  Neuro- Strength 5/5 flexors/extensors, DTRs 2+ and symmetric, Intact sensation to light touch grossly    Significant Labs:  Amylase: No results for input(s): AMYLASE in the last 48 hours.  Blood Culture: No results for input(s): LABBLOO in the last 48 hours.  CBC:   Recent Labs   Lab 02/21/23  0511   WBC 11.86   HGB 11.8*   HCT 32.0*   *       CMP:   Recent Labs   Lab 02/21/23  0511      CALCIUM 8.7   ALBUMIN 3.1*   PROT 5.5*   *   K 2.9*   CO2 25   CL 92*   BUN 12   CREATININE 0.7   ALKPHOS 63   ALT 55*   AST 52*   BILITOT 1.2*       Coagulation: No results for input(s): PT, INR, APTT in the last 48 hours.  Lipase:   Recent Labs   Lab 02/21/23  0511   LIPASE 154*         Significant Imaging:  U/S: I have reviewed all results within the past 24 hours and my personal findings are:  IMPRESSION:  IMPRESSION:   1. Exam is limited as noted above.   2. Fatty infiltration of the liver.   3. No evidence of cholecystitis or obstruction.     CT of the chest:   IMPRESSION:   1. There is no identifiable extravasation of ingested water-soluble oral contrast material to indicate esophageal perforation. There is no pathologic mediastinal fluid collection or pleural effusion. If there is persistent clinical suspicion of esophageal perforation, endoscopy should be considered.   2. Mild pneumomediastinum, with air primarily around the left main bronchus and in a paraesophageal distribution     CT abdomen pelvis:   1. Subtle fat stranding adjacent to  the pancreatic tail suspicious for early or mild pancreatitis. Correlate with amylase and lipase levels.   2. Focal pneumomediastinum, with multiple air bubbles noted dorsal to the distal esophagus. The esophagus is slightly thick-walled which could be on the basis of esophagitis or incomplete distention. While esophageal perforation is felt unlikely, if this is a strong clinical concern, CT chest following water-soluble oral contrast administration could be performed.   3. Severe hepatic steatosis. Hyperdense appearance of the gallbladder is likely on the basis of hyperdense bile.   4. Slightly thick-walled appearance of the colon and of the ileum. Correlate clinically for enterocolitis. Liver disease/hypoalbuminemia could also give this same appearance, and wall thickening of the colon may be in part related to incomplete distention.   5. Additional findings as above.       Assessment/Plan:     Active Diagnoses:    Diagnosis Date Noted POA    PRINCIPAL PROBLEM:  Acute pancreatitis [K85.90] 02/19/2023 Unknown    Hyponatremia [E87.1] 02/19/2023 Yes    Low blood magnesium [R79.0] 02/19/2023 Yes    Hypertension, essential [I10] 02/19/2023 Yes     Chronic    JAIME (acute kidney injury) [N17.9] 02/19/2023 Yes    Hypokalemia [E87.6] 02/19/2023 Yes    Suicidal ideation [R45.851] 02/19/2023 Not Applicable     Chronic    Chronic hepatitis C virus infection [B18.2] 10/04/2017 Yes      Problems Resolved During this Admission:    Diagnosis Date Noted Date Resolved POA    Enterocolitis [K52.9] 02/19/2023 02/19/2023 Yes       42-year-old gentleman admitted to the hospital with abdominal pain and likely alcohol-related pancreatitis.  Found to have suicidal ideations and is pec awaiting placement with findings suspicious for Camila-Portillo tear    Acute pancreatitis  Patient presented with acute pancreatitis, lipase improving significantly  - obtain esophagram to ensure no extravasation and okay to slowly advance diet to full liquids  to follow esophagram and then solid food in 48 hours .    Suspect Camila-Portillo tear in the setting of pancreatitis and 3-4 days of retching.  Repeat esophagram and okay to advance to liquid diet if persistent negative for extravasation.  Esophageal thickening on CT imaging right represent some underlying eosinophilic esophagitis but would like to hold off on endoscopy for now.  Continue Protonix iv x 48 hours    Reiterated NPO status for the patient in the setting of pneumomediastinum and events.  Likely will be able to advance diet after esophagram tomorrow.  Okay for pills with sips of water as he had been receiving yesterday     Suicidal ideation  Suicide precautions.  ICU  Psych consult  Will need placement  PEC            Hypokalemia  Aggressive replacement           JAIME (acute kidney injury)  JAIME due to dehydration  Improved with IV fluids  Appreciate nephrology recommendations           Hypertension, essential  Vs Q 4 hrs  Continue home medication  Iv hydralazine 10mg prn sbp >180         Low blood magnesium  Replace as needed        Hyponatremia  Sodium 120 on admission  Has corrected to 128 within 12 hours  Correcting slowly  Continue IV fluids per Nephrology appreciate their recommendations  Likely a combination of dilutional and hypovolemic hyponatremia        Chronic hepatitis C virus infection  Needs outpatient follow-up - viral load was detectable in 2020 and undetectable in 2021.  Suspect prior treatment  Thank you for your consult. I will follow-up with patient. Please contact us if you have any additional questions.    Bebe Hernandez MD  Gastroenterology  [facility] - Emergency Dept

## 2023-02-21 NOTE — PROGRESS NOTES
"[facility] Medicine  Progress Note    Patient Name: Elder Rosales  MRN: [id number]  Patient Class: IP- Inpatient   Admission Date: 2/19/2023  Length of Stay: 2 days  Attending Physician: Nayely Denson MD  Primary Care Provider: [facility]        Subjective:     Principal Problem:Hyponatremia        HPI:  Patient is a 41 yo cauc male with hx ETOH, >20y/pack/d Reformed smoker, vertigo, ADHD, IVDU, Polysubstance abused,treated Hep c . He reports going camping and fishing 3 days ago and experienced vertigo, abdominal pain, N/V, falling and imbalance, hearing voices in his head and talking to himself. He report dark black stools for 4 days however hbg is stable. He states, " he stop use to smoke a pack/d, and drink 12-15 beers a day but stopped a month ago". While in the ED reported suicidal thoughts and depression and asked to speak with a psychiatrist. He was evaluated by telepsych. On assessment patient was fidgeting, difficulty focusing, not staying on track with questions asked and gaurded to left hip and back. He denied drug use however past documentation states iv drug user,and  polysubstance abuse. He denies chest pain, dysuria, fever, and chills.     Hospitalist asked to admit for hyponatermia , pancreatitis, enterocolitis, abd ct abnormality. Nephrology, GI consulted in ED.      Overview/Hospital Course:  Patient with a history of alcoholism, smoker, vertigo, history of IVDU but no recent use, treated hepatitis-C.  States he went out fishing/camping, believes he had an attack of vertigo, states subsequent day he was generally weak, it took him 4 days to come out of the camp.  States he was having dark stool.  He presented to the ED, expressed suicidal ideation on background history of depression, he was seen by tele psych kaylah Adams, recommends Seroquel 100 q.h.s., Remeron 15 mg q.h.s. Admission labs with hyponatremia 120 with " severe hypokalemia, transaminitis with elevation of lipase.  He was admitted to the ICU, GI and Nephrology consultation, IV fluid hydration, continuous PPI therapy, esophagram.      Interval History:  See hospital course.  Does report intermittent lightheadedness, no nausea or vomiting, some epigastric discomfort, had 1 bowel movement this morning.  Reporting some numbness in his feet.  States yesterday he had suicidal ideation but none today, denies hallucination.  T-max 99.2°.  On room air.  Labs with hemoglobin 11.8, sodium 131, potassium 2.9.  Urine drug screen negative.  Ethanol level negative.  Discussed with patient.  Discussed with ICU RN.    Review of Systems   Constitutional:  Negative for fever.   Respiratory:  Positive for shortness of breath.    Gastrointestinal:  Positive for abdominal pain.   Genitourinary:  Negative for difficulty urinating.   Neurological:  Positive for light-headedness and numbness.   Psychiatric/Behavioral:  Negative for hallucinations.    Objective:     Vital Signs (Most Recent):  Temp: 98.2 °F (36.8 °C) (02/21/23 0730)  Pulse: 69 (02/21/23 1109)  Resp: (!) 21 (02/21/23 1109)  BP: 123/71 (02/21/23 1101)  SpO2: 99 % (02/21/23 1109)   Vital Signs (24h Range):  Temp:  [98.1 °F (36.7 °C)-99.2 °F (37.3 °C)] 98.2 °F (36.8 °C)  Pulse:  [53-74] 69  Resp:  [15-27] 21  SpO2:  [97 %-100 %] 99 %  BP: (109-166)/(62-97) 123/71     Weight: 84.2 kg (185 lb 10 oz)  Body mass index is 25.18 kg/m².    Intake/Output Summary (Last 24 hours) at 2/21/2023 1158  Last data filed at 2/21/2023 0534  Gross per 24 hour   Intake 1710 ml   Output 395 ml   Net 1315 ml      Physical Exam  Vitals and nursing note reviewed.   Constitutional:       General: He is not in acute distress.     Appearance: He is not ill-appearing.      Comments: Lying in bed, cooperative   HENT:      Head: Normocephalic and atraumatic.      Mouth/Throat:      Mouth: Mucous membranes are moist.   Cardiovascular:      Rate and Rhythm:  Normal rate and regular rhythm.   Pulmonary:      Effort: No respiratory distress.      Breath sounds: No wheezing.      Comments: On room air  Abdominal:      General: There is no distension.      Palpations: Abdomen is soft.      Tenderness: There is no guarding.   Genitourinary:     Comments: Urinal at bedside  Skin:     General: Skin is warm and dry.   Neurological:      Mental Status: He is alert and oriented to person, place, and time.      Comments: Speech intact, interactive, moving all 4 extremities   Psychiatric:      Comments: Denies any current suicidal ideation, states he did have yesterday, no hallucination       Significant Labs: BMP:   Recent Labs   Lab 02/21/23  0511      *   K 2.9*   CL 92*   CO2 25   BUN 12   CREATININE 0.7   CALCIUM 8.7   MG 2.2     CBC:   Recent Labs   Lab 02/21/23  0511   WBC 11.86   HGB 11.8*   HCT 32.0*   *     CMP:   Recent Labs   Lab 02/20/23  1835 02/21/23  0012 02/21/23  0511   * 130* 131*   K 2.8* 3.0* 2.9*   CL 89* 92* 92*   CO2 26 24 25   GLU 92 98 100   BUN 15 13 12   CREATININE 0.8 0.8 0.7   CALCIUM 9.5 8.9 8.7   PROT  --   --  5.5*   ALBUMIN  --   --  3.1*   BILITOT  --   --  1.2*   ALKPHOS  --   --  63   AST  --   --  52*   ALT  --   --  55*   ANIONGAP 15 14 14     Cardiac Markers: No results for input(s): CKMB, MYOGLOBIN, BNP, TROPISTAT in the last 48 hours.  Magnesium:   Recent Labs   Lab 02/19/23  1801 02/20/23  0752 02/21/23  0511   MG 1.7 1.8 2.2       Significant Imaging: I have reviewed all pertinent imaging results/findings within the past 24 hours.    X-Ray Hip 2 or 3 views Left (with Pelvis when performed)    Result Date: 2/19/2023  Left hip with AP pelvis CLINICAL DATA: Trauma, pain FINDINGS: 3 views are negative for fracture, dislocation, or osseous destructive lesion. No significant arthritic change is identified. Soft tissues are unremarkable. IMPRESSION: 1. No radiographic abnormalities. Electronically signed by:  Naren  Vanessa FIGUEROA  2/19/2023 10:42 AM CST Workstation: [id number]    CT Head Without Contrast    Result Date: 2/19/2023  CT HEAD WITHOUT CONTRAST CMS MANDATED QUALITY DATA - CT RADIATION  436 All CT scans at this facility utilize dose modulation, iterative reconstruction, and/or weight based dosing when appropriate to reduce radiation dose to as low as reasonably achievable Clinical data: Dizziness. Comparison to December 2018. FINDINGS: Noninfusion images were obtained from the skull base to the vertex. There is no intracranial mass, hemorrhage, or midline shift. Ventricles and sulci are normal. There are no pathologic extra-axial fluid collections. There is no evidence of ischemic change or edema. Cerebellum and brainstem are normal. The calvarium is intact.Mild mucoperiosteal thickening in the left maxillary sinus is consistent with chronic sinusitis, improved compared to the prior study. IMPRESSION: 1. Normal CT appearance of the brain. 2. Mild chronic left maxillary sinusitis, improved compared to 2018. Electronically signed by:  Naren Mendez MD  2/19/2023 2:31 PM CST Workstation: [id number]    CT Chest Without Contrast    Result Date: 2/19/2023  CT chest without contrast CLINICAL DATA: Pneumomediastinum CMS MANDATED QUALITY DATA - CT RADIATION  436 All CT scans at this facility utilize dose modulation, iterative reconstruction, and/or weight based dosing when appropriate to reduce radiation dose to as low as reasonably achievable. Findings: Thin section axial noncontrast images were obtained from the thoracic inlet to the adrenal glands, following the administration of water-soluble oral contrast. Again noted are findings of pneumomediastinum, with multiple bubbles of mediastinal air adjacent to the esophagus and left main bronchus. There is no contrast extravasation to indicate esophageal perforation. No pleural effusions are demonstrated. There is mild circumferential wall thickening of the lower two  thirds of the thoracic esophagus suggesting possible esophagitis. Correlate clinically. Heart size is normal. The thoracic aorta is normal in caliber. No mediastinal lymphadenopathy is identified. There are no pulmonary infiltrates or mass lesions. No pneumothorax is demonstrated. 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 IMPRESSION: 1. There is no identifiable extravasation of ingested water-soluble oral contrast material to indicate esophageal perforation. There is no pathologic mediastinal fluid collection or pleural effusion. If there is persistent clinical suspicion of esophageal perforation, endoscopy should be considered. 2. Mild pneumomediastinum, with air primarily around the left main bronchus and in a paraesophageal distribution Electronically signed by:  Naren Mendez MD  2/19/2023 4:23 PM CST Workstation: [id number]    CT Abdomen Pelvis With Contrast    Result Date: 2/19/2023  CT ABDOMEN AND PELVIS WITH CONTRAST HISTORY: Abdominal pain CMS MANDATED QUALITY DATA - CT RADIATION  436 All CT scans at this facility utilize dose modulation, iterative reconstruction, and/or weight based dosing when appropriate to reduce radiation dose to as low as reasonably achievable FINDINGS: Post infusion images were obtained from the lung bases to the pubic symphysis. 100 cc nonionic contrast was administered for the examination. The lung bases are unremarkable. Focal pneumomediastinum is noted, with multiple air bubbles noted posterior to the esophagus. There is mild circumferential wall thickening of the esophagus distally which could be on the basis of underdistention or esophagitis. There is marked hepatic steatosis. The gallbladder is mildly dilated. Gallbladder lumen is hyperdense in appearance, likely on the basis of hyperdense bile. Biliary tree is nondilated. The spleen has a normal appearance. There is subtle fat stranding adjacent to the pancreatic tail. Correlate  with amylase and lipase levels. No pancreatic mass is identified. The adrenal glands are normal. The bilateral kidneys are unremarkable. The abdominal aorta is normal in caliber. The colon appears slightly thick-walled throughout its course, however this may be related to incomplete distention. Slightly thick-walled appearance of ileal loops is also noted. No free air or free fluid is identified. Images of the pelvis demonstrate a normal urinary bladder. There is no free fluid or lymphadenopathy. No acute osseous abnormalities are identified. IMPRESSION: 1. Subtle fat stranding adjacent to the pancreatic tail suspicious for early or mild pancreatitis. Correlate with amylase and lipase levels. 2. Focal pneumomediastinum, with multiple air bubbles noted dorsal to the distal esophagus. The esophagus is slightly thick-walled which could be on the basis of esophagitis or incomplete distention. While esophageal perforation is felt unlikely, if this is a strong clinical concern, CT chest following water-soluble oral contrast administration could be performed. 3. Severe hepatic steatosis. Hyperdense appearance of the gallbladder is likely on the basis of hyperdense bile. 4. Slightly thick-walled appearance of the colon and of the ileum. Correlate clinically for enterocolitis. Liver disease/hypoalbuminemia could also give this same appearance, and wall thickening of the colon may be in part related to incomplete distention. 5. Additional findings as above. Electronically signed by:  Naren Mendez MD  2/19/2023 2:46 PM CST Workstation: [id number]    X-Ray Chest AP Portable    Result Date: 2/19/2023  Chest single view Clinical data:Chest pain. Comparison to January 2022. FINDINGS: AP view of the chest demonstrates no cardiac, pulmonary, or osseous abnormalities. IMPRESSION: 1. Normal chest single view. Electronically signed by:  Naren Mendez MD  2/19/2023 10:41 AM CST Workstation: [id number]    US Abdomen  Limited    Result Date: 2/19/2023  Abdominal ultrasound Limited CLINICAL DATA: Hyperbilirubinemia FINDINGS: Sonographic assessment targeted to the right upper quadrant was performed. The pancreas and midline retroperitoneum are obscured by bowel gas. Visualization of the liver is limited due to poor penetration. The liver is echogenic compatible with fatty infiltration. No mass is identified. Hepatopedal flow is noted within the portal vein. The gallbladder is mildly distended. No gallstones are identified. Common bile duct measures 5 mm. The right kidney is normal in appearance. No free fluid is identified. IMPRESSION: 1. Exam is limited as noted above. 2. Fatty infiltration of the liver. 3. No evidence of cholecystitis or obstruction. Electronically signed by:  Naren Mendez MD  2/19/2023 5:11 PM [facility] Workstation: [id number]         Assessment/Plan:      Active Hospital Problems    Diagnosis    *Hyponatremia    Hypokalemia    Acute pancreatitis    Camila-Portillo tear, suspected    Depression    Anemia    Thrombocytopenia    Hypertension, essential    Suicidal ideation    Hepatitis C virus infection cured after antiviral drug therapy     s/p epclusa, svr12 - 3/2021 (treated / cured)      Nicotine abuse     Plan:  Transfer to medical floor with remote telemetry monitoring   Patient suicidal ideation/depression, seen by Dr. Fountain, tele psych on presentation, recommends pec, Seroquel 100 mg q.h.s., mirtazapine 15 mg q.h.s., subsequently seen by Dr. Cole and [facility] for suicidal ideation on 2/20/23 at 1606 pm  [facility] safety precautions, protocol, one-to-one sitter  NPO except for medication as per Gastroenterology   Awaiting repeat barium esophagram for suspected Camila-Portillo tear   Continue IV PPI therapy  Continue IV fluid with trending of sodium level   Replacement of potassium as per sliding scale   Electrolytes sliding scale repletion  Trending labs  Appreciate all consultant's input   Further plan as  per hospital course   Once medically stable to transfer to inpatient psychiatric facility for ongoing care    VTE Risk Mitigation (From admission, onward)         Ordered     enoxaparin injection 40 mg  Every 24 hours (non-standard times)         02/21/23 1151     IP VTE LOW RISK PATIENT  Once         02/19/23 1822                Discharge Planning   FLORINA: 2/20/2023     Code Status: Full Code   Is the patient medically ready for discharge?:     Reason for patient still in hospital (select all that apply): Patient trending condition  Discharge Plan A: Home                  Nayely Denson MD  Department of Hospital Medicine   [facility]

## 2023-02-21 NOTE — CARE UPDATE
02/21/23 0812   Patient Assessment/Suction   Level of Consciousness (AVPU) alert   Respiratory Effort Unlabored   PRE-TX-O2   Device (Oxygen Therapy) room air   SpO2 99 %   Pulse Oximetry Type Continuous   $ Pulse Oximetry - Multiple Charge Pulse Oximetry - Multiple   Pulse (!) 53   Resp 18   Respiratory Evaluation   $ Care Plan Tech Time 15 min

## 2023-02-22 PROBLEM — D69.6 THROMBOCYTOPENIA: Status: RESOLVED | Noted: 2023-02-21 | Resolved: 2023-02-22

## 2023-02-22 PROBLEM — K85.90 ACUTE PANCREATITIS: Status: RESOLVED | Noted: 2023-02-19 | Resolved: 2023-02-22

## 2023-02-22 LAB
ALBUMIN SERPL BCP-MCNC: 3 G/DL (ref 3.5–5.2)
ALP SERPL-CCNC: 68 U/L (ref 55–135)
ALT SERPL W/O P-5'-P-CCNC: 50 U/L (ref 10–44)
ANION GAP SERPL CALC-SCNC: 11 MMOL/L (ref 8–16)
AST SERPL-CCNC: 45 U/L (ref 10–40)
BACTERIA UR CULT: NO GROWTH
BILIRUB SERPL-MCNC: 0.9 MG/DL (ref 0.1–1)
BUN SERPL-MCNC: 7 MG/DL (ref 6–20)
C DIFF GDH STL QL: POSITIVE
C DIFF TOX A+B STL QL IA: POSITIVE
CALCIUM SERPL-MCNC: 8.8 MG/DL (ref 8.7–10.5)
CHLORIDE SERPL-SCNC: 94 MMOL/L (ref 95–110)
CO2 SERPL-SCNC: 27 MMOL/L (ref 23–29)
CREAT SERPL-MCNC: 0.7 MG/DL (ref 0.5–1.4)
ERYTHROCYTE [DISTWIDTH] IN BLOOD BY AUTOMATED COUNT: 14.9 % (ref 11.5–14.5)
EST. GFR  (NO RACE VARIABLE): >60 ML/MIN/1.73 M^2
GLUCOSE SERPL-MCNC: 92 MG/DL (ref 70–110)
HCT VFR BLD AUTO: 33.8 % (ref 40–54)
HGB BLD-MCNC: 11.9 G/DL (ref 14–18)
MAGNESIUM SERPL-MCNC: 1.7 MG/DL (ref 1.6–2.6)
MCH RBC QN AUTO: 30.6 PG (ref 27–31)
MCHC RBC AUTO-ENTMCNC: 35.2 G/DL (ref 32–36)
MCV RBC AUTO: 87 FL (ref 82–98)
PLATELET # BLD AUTO: 159 K/UL (ref 150–450)
PMV BLD AUTO: 11.5 FL (ref 9.2–12.9)
POTASSIUM SERPL-SCNC: 3.1 MMOL/L (ref 3.5–5.1)
POTASSIUM SERPL-SCNC: 3.1 MMOL/L (ref 3.5–5.1)
POTASSIUM SERPL-SCNC: 3.2 MMOL/L (ref 3.5–5.1)
POTASSIUM SERPL-SCNC: 3.2 MMOL/L (ref 3.5–5.1)
POTASSIUM SERPL-SCNC: 3.6 MMOL/L (ref 3.5–5.1)
PROT SERPL-MCNC: 6 G/DL (ref 6–8.4)
RBC # BLD AUTO: 3.89 M/UL (ref 4.6–6.2)
SODIUM SERPL-SCNC: 132 MMOL/L (ref 136–145)
WBC # BLD AUTO: 13.56 K/UL (ref 3.9–12.7)
WBC #/AREA STL HPF: NORMAL /[HPF]

## 2023-02-22 PROCEDURE — 99900031 HC PATIENT EDUCATION (STAT)

## 2023-02-22 PROCEDURE — 63600175 PHARM REV CODE 636 W HCPCS: Performed by: INTERNAL MEDICINE

## 2023-02-22 PROCEDURE — 36415 COLL VENOUS BLD VENIPUNCTURE: CPT | Performed by: STUDENT IN AN ORGANIZED HEALTH CARE EDUCATION/TRAINING PROGRAM

## 2023-02-22 PROCEDURE — 89055 LEUKOCYTE ASSESSMENT FECAL: CPT | Performed by: INTERNAL MEDICINE

## 2023-02-22 PROCEDURE — 25000003 PHARM REV CODE 250: Performed by: INTERNAL MEDICINE

## 2023-02-22 PROCEDURE — 83735 ASSAY OF MAGNESIUM: CPT | Performed by: INTERNAL MEDICINE

## 2023-02-22 PROCEDURE — 63600175 PHARM REV CODE 636 W HCPCS: Performed by: NURSE PRACTITIONER

## 2023-02-22 PROCEDURE — 12000002 HC ACUTE/MED SURGE SEMI-PRIVATE ROOM

## 2023-02-22 PROCEDURE — 63600175 PHARM REV CODE 636 W HCPCS: Performed by: STUDENT IN AN ORGANIZED HEALTH CARE EDUCATION/TRAINING PROGRAM

## 2023-02-22 PROCEDURE — 25000003 PHARM REV CODE 250

## 2023-02-22 PROCEDURE — 94799 UNLISTED PULMONARY SVC/PX: CPT

## 2023-02-22 PROCEDURE — 25000242 PHARM REV CODE 250 ALT 637 W/ HCPCS: Performed by: INTERNAL MEDICINE

## 2023-02-22 PROCEDURE — 36415 COLL VENOUS BLD VENIPUNCTURE: CPT | Performed by: INTERNAL MEDICINE

## 2023-02-22 PROCEDURE — 87449 NOS EACH ORGANISM AG IA: CPT | Performed by: INTERNAL MEDICINE

## 2023-02-22 PROCEDURE — 85027 COMPLETE CBC AUTOMATED: CPT | Performed by: STUDENT IN AN ORGANIZED HEALTH CARE EDUCATION/TRAINING PROGRAM

## 2023-02-22 PROCEDURE — 87045 FECES CULTURE AEROBIC BACT: CPT | Performed by: INTERNAL MEDICINE

## 2023-02-22 PROCEDURE — 94640 AIRWAY INHALATION TREATMENT: CPT

## 2023-02-22 PROCEDURE — 25000003 PHARM REV CODE 250: Performed by: NURSE PRACTITIONER

## 2023-02-22 PROCEDURE — 80053 COMPREHEN METABOLIC PANEL: CPT | Performed by: STUDENT IN AN ORGANIZED HEALTH CARE EDUCATION/TRAINING PROGRAM

## 2023-02-22 PROCEDURE — 84132 ASSAY OF SERUM POTASSIUM: CPT | Mod: 91 | Performed by: INTERNAL MEDICINE

## 2023-02-22 PROCEDURE — 87046 STOOL CULTR AEROBIC BACT EA: CPT | Performed by: INTERNAL MEDICINE

## 2023-02-22 PROCEDURE — 99900035 HC TECH TIME PER 15 MIN (STAT)

## 2023-02-22 PROCEDURE — S0030 INJECTION, METRONIDAZOLE: HCPCS | Performed by: INTERNAL MEDICINE

## 2023-02-22 PROCEDURE — C9113 INJ PANTOPRAZOLE SODIUM, VIA: HCPCS | Performed by: INTERNAL MEDICINE

## 2023-02-22 RX ORDER — METRONIDAZOLE 500 MG/100ML
500 INJECTION, SOLUTION INTRAVENOUS
Status: DISCONTINUED | OUTPATIENT
Start: 2023-02-22 | End: 2023-02-25

## 2023-02-22 RX ORDER — PANTOPRAZOLE SODIUM 40 MG/1
40 TABLET, DELAYED RELEASE ORAL DAILY
Status: DISCONTINUED | OUTPATIENT
Start: 2023-02-22 | End: 2023-03-04 | Stop reason: HOSPADM

## 2023-02-22 RX ORDER — POTASSIUM CHLORIDE 20 MEQ/1
40 TABLET, EXTENDED RELEASE ORAL 2 TIMES DAILY
Status: DISCONTINUED | OUTPATIENT
Start: 2023-02-22 | End: 2023-02-23

## 2023-02-22 RX ORDER — SODIUM CHLORIDE AND POTASSIUM CHLORIDE 150; 900 MG/100ML; MG/100ML
INJECTION, SOLUTION INTRAVENOUS CONTINUOUS
Status: DISCONTINUED | OUTPATIENT
Start: 2023-02-22 | End: 2023-02-23

## 2023-02-22 RX ORDER — POTASSIUM CHLORIDE 20 MEQ/1
40 TABLET, EXTENDED RELEASE ORAL ONCE
Status: COMPLETED | OUTPATIENT
Start: 2023-02-22 | End: 2023-02-22

## 2023-02-22 RX ADMIN — MORPHINE SULFATE 3 MG: 4 INJECTION, SOLUTION INTRAMUSCULAR; INTRAVENOUS at 06:02

## 2023-02-22 RX ADMIN — MORPHINE SULFATE 3 MG: 4 INJECTION, SOLUTION INTRAMUSCULAR; INTRAVENOUS at 02:02

## 2023-02-22 RX ADMIN — ONDANSETRON 4 MG: 2 INJECTION INTRAMUSCULAR; INTRAVENOUS at 07:02

## 2023-02-22 RX ADMIN — QUETIAPINE 100 MG: 100 TABLET ORAL at 10:02

## 2023-02-22 RX ADMIN — MUPIROCIN 1 G: 20 OINTMENT TOPICAL at 07:02

## 2023-02-22 RX ADMIN — METRONIDAZOLE 500 MG: 5 INJECTION, SOLUTION INTRAVENOUS at 07:02

## 2023-02-22 RX ADMIN — GABAPENTIN 800 MG: 300 CAPSULE ORAL at 10:02

## 2023-02-22 RX ADMIN — ARFORMOTEROL TARTRATE 15 MCG: 15 SOLUTION RESPIRATORY (INHALATION) at 07:02

## 2023-02-22 RX ADMIN — LOPERAMIDE HYDROCHLORIDE 2 MG: 2 CAPSULE ORAL at 10:02

## 2023-02-22 RX ADMIN — MAGNESIUM SULFATE HEPTAHYDRATE 2 G: 40 INJECTION, SOLUTION INTRAVENOUS at 07:02

## 2023-02-22 RX ADMIN — TRAMADOL HYDROCHLORIDE 50 MG: 50 TABLET, COATED ORAL at 07:02

## 2023-02-22 RX ADMIN — POTASSIUM CHLORIDE 40 MEQ: 1500 TABLET, EXTENDED RELEASE ORAL at 10:02

## 2023-02-22 RX ADMIN — SODIUM CHLORIDE AND POTASSIUM CHLORIDE: 9; 1.49 INJECTION, SOLUTION INTRAVENOUS at 02:02

## 2023-02-22 RX ADMIN — SODIUM CHLORIDE AND POTASSIUM CHLORIDE: 9; 1.49 INJECTION, SOLUTION INTRAVENOUS at 01:02

## 2023-02-22 RX ADMIN — CHLORHEXIDINE GLUCONATE 15 ML: 1.2 RINSE ORAL at 07:02

## 2023-02-22 RX ADMIN — Medication 6 MG: at 10:02

## 2023-02-22 RX ADMIN — PANTOPRAZOLE SODIUM 8 MG/HR: 40 INJECTION, POWDER, FOR SOLUTION INTRAVENOUS at 03:02

## 2023-02-22 RX ADMIN — ENOXAPARIN SODIUM 40 MG: 100 INJECTION SUBCUTANEOUS at 10:02

## 2023-02-22 RX ADMIN — TRAMADOL HYDROCHLORIDE 50 MG: 50 TABLET, COATED ORAL at 10:02

## 2023-02-22 RX ADMIN — LOPERAMIDE HYDROCHLORIDE 2 MG: 2 CAPSULE ORAL at 02:02

## 2023-02-22 RX ADMIN — BUDESONIDE INHALATION 0.5 MG: 0.5 SUSPENSION RESPIRATORY (INHALATION) at 07:02

## 2023-02-22 RX ADMIN — POTASSIUM CHLORIDE 40 MEQ: 1500 TABLET, EXTENDED RELEASE ORAL at 12:02

## 2023-02-22 RX ADMIN — PANTOPRAZOLE SODIUM 40 MG: 40 TABLET, DELAYED RELEASE ORAL at 10:02

## 2023-02-22 RX ADMIN — TRAMADOL HYDROCHLORIDE 50 MG: 50 TABLET, COATED ORAL at 03:02

## 2023-02-22 RX ADMIN — BUDESONIDE INHALATION 0.5 MG: 0.5 SUSPENSION RESPIRATORY (INHALATION) at 08:02

## 2023-02-22 RX ADMIN — VANCOMYCIN HYDROCHLORIDE 125 MG: KIT at 11:02

## 2023-02-22 NOTE — PROGRESS NOTES
"[facility] Medicine  Progress Note    Patient Name: Elder Rosales  MRN: [id number]  Patient Class: IP- Inpatient   Admission Date: 2/19/2023  Length of Stay: 3 days  Attending Physician: Nayely Denson MD  Primary Care Provider: [facility]        Subjective:     Principal Problem:Suicidal ideation        HPI:  Patient is a 41 yo cauc male with hx ETOH, >20y/pack/d Reformed smoker, vertigo, ADHD, IVDU, Polysubstance abused,treated Hep c . He reports going camping and fishing 3 days ago and experienced vertigo, abdominal pain, N/V, falling and imbalance, hearing voices in his head and talking to himself. He report dark black stools for 4 days however hbg is stable. He states, " he stop use to smoke a pack/d, and drink 12-15 beers a day but stopped a month ago". While in the ED reported suicidal thoughts and depression and asked to speak with a psychiatrist. He was evaluated by telepsych. On assessment patient was fidgeting, difficulty focusing, not staying on track with questions asked and gaurded to left hip and back. He denied drug use however past documentation states iv drug user,and  polysubstance abuse. He denies chest pain, dysuria, fever, and chills.     Hospitalist asked to admit for hyponatermia , pancreatitis, enterocolitis, abd ct abnormality. Nephrology, GI consulted in ED.      Overview/Hospital Course:  Patient with a history of alcoholism, smoker, vertigo, history of IVDU but no recent use, treated hepatitis-C.  States he went out fishing/camping, believes he had an attack of vertigo, states subsequent day he was generally weak, it took him 4 days to come out of the camp.  States he was having dark stool.  He presented to the ED, expressed suicidal ideation on background history of depression, he was seen by tele psych kaylah Adams, recommends Seroquel 100 q.h.s., Remeron 15 mg q.h.s. Admission labs with hyponatremia 120 " with severe hypokalemia, transaminitis with elevation of lipase.  He was admitted to the ICU, GI and Nephrology consultation, IV fluid hydration, continuous PPI therapy, esophagram.  On 02/22, esophagram with no evidence of esophageal perforation or tears, starting liquid diet and advancing as tolerated, once medically cleared will need inpatient psychiatric placement.      Interval History:  Patient states he does feel improved today.  Mild epigastric discomfort with nausea but no emesis, states he does feel hungry and is asking for diet.  Two interval bowel movements.  Urinating well.  When asked states yes to suicidal ideation but he is not able to state what these involved, later he states just depression.  Overnight intermittently bradycardic but otherwise stable/asymptomatic, no further episodes today.  Labs with WBC 13, hemoglobin 11.9, platelet 159, potassium 3.1, sodium 132.  Esophagram negative.  Discussed with patient.  Discussed with nursing.    Review of Systems   Constitutional:  Negative for fever.   Gastrointestinal:  Positive for abdominal pain and nausea.   Genitourinary:  Negative for difficulty urinating.   Neurological:  Positive for light-headedness.   Psychiatric/Behavioral:  Positive for dysphoric mood. Negative for hallucinations.    Objective:     Vital Signs (Most Recent):  Temp: 97.9 °F (36.6 °C) (02/22/23 0717)  Pulse: 69 (02/22/23 1001)  Resp: 19 (02/22/23 1029)  BP: 117/80 (02/22/23 1001)  SpO2: 98 % (02/22/23 1001) Vital Signs (24h Range):  Temp:  [97.9 °F (36.6 °C)-98.6 °F (37 °C)] 97.9 °F (36.6 °C)  Pulse:  [49-71] 69  Resp:  [13-24] 19  SpO2:  [96 %-100 %] 98 %  BP: (116-145)/(70-98) 117/80     Weight: 84.2 kg (185 lb 10 oz)  Body mass index is 25.18 kg/m².    Intake/Output Summary (Last 24 hours) at 2/22/2023 1038  Last data filed at 2/22/2023 0544  Gross per 24 hour   Intake 3223.75 ml   Output 1250 ml   Net 1973.75 ml      Physical Exam  Vitals and nursing note reviewed.    Constitutional:       General: He is not in acute distress.     Appearance: He is not ill-appearing.      Comments: sitting in bed, cooperative   HENT:      Head: Normocephalic and atraumatic.      Mouth/Throat:      Mouth: Mucous membranes are moist.   Cardiovascular:      Rate and Rhythm: Normal rate and regular rhythm.   Pulmonary:      Effort: No respiratory distress.      Breath sounds: No wheezing.      Comments: On room air  Abdominal:      General: There is no distension.      Palpations: Abdomen is soft.      Tenderness: There is no guarding.   Skin:     General: Skin is warm and dry.   Neurological:      Mental Status: He is alert and oriented to person, place, and time.      Comments: Speech intact, interactive, moving all 4 extremities   Psychiatric:      Comments: no hallucination       Significant Labs: BMP:   Recent Labs   Lab 02/22/23  0249 02/22/23  0553   GLU 92  --    *  --    K 3.2* 3.1*   CL 94*  --    CO2 27  --    BUN 7  --    CREATININE 0.7  --    CALCIUM 8.8  --    MG 1.7  --      CBC:   Recent Labs   Lab 02/21/23  0511 02/22/23  0249   WBC 11.86 13.56*   HGB 11.8* 11.9*   HCT 32.0* 33.8*   * 159     CMP:   Recent Labs   Lab 02/21/23  0012 02/21/23  0511 02/21/23  1754 02/21/23  2350 02/22/23  0249 02/22/23  0553   * 131*  --   --  132*  --    K 3.0* 2.9*   < > 3.2* 3.2* 3.1*   CL 92* 92*  --   --  94*  --    CO2 24 25  --   --  27  --    GLU 98 100  --   --  92  --    BUN 13 12  --   --  7  --    CREATININE 0.8 0.7  --   --  0.7  --    CALCIUM 8.9 8.7  --   --  8.8  --    PROT  --  5.5*  --   --  6.0  --    ALBUMIN  --  3.1*  --   --  3.0*  --    BILITOT  --  1.2*  --   --  0.9  --    ALKPHOS  --  63  --   --  68  --    AST  --  52*  --   --  45*  --    ALT  --  55*  --   --  50*  --    ANIONGAP 14 14  --   --  11  --     < > = values in this interval not displayed.     Cardiac Markers: No results for input(s): CKMB, MYOGLOBIN, BNP, TROPISTAT in the last 48  hours.  Magnesium:   Recent Labs   Lab 02/21/23  0511 02/22/23  0249   MG 2.2 1.7       Significant Imaging: I have reviewed all pertinent imaging results/findings within the past 24 hours.      Assessment/Plan:      Active Hospital Problems    Diagnosis    *Suicidal ideation    Hypokalemia    Camila-Portillo tear, suspected    Depression    Anemia    Hyponatremia    Hepatitis C virus infection cured after antiviral drug therapy     s/p epclusa, svr12 - 3/2021 (treated / cured)      Nicotine abuse     Plan:  Awaiting a bed on medical floor  Discontinue IV fluid and start liquid diet, advanced as tolerated  Discontinue continuous IV PPI infusion, pantoprazole 40 mg once daily   40 mEq b.i.d. standing potassium oral supplementation and trending   Patient suicidal ideation/depression, seen by Dr. Fountain, tele psych on presentation, recommends pec, Seroquel 100 mg q.h.s., mirtazapine 15 mg q.h.s., subsequently seen by Dr. Cole and [facility] for suicidal ideation on 2/20/23 at 1606 pm  [facility] safety precautions, protocol, one-to-one sitter  Electrolytes sliding scale repletion  Trending labs  Appreciate all consultant's input   Further plan as per hospital course   Once medically stable to transfer to inpatient psychiatric facility for ongoing care    VTE Risk Mitigation (From admission, onward)         Ordered     enoxaparin injection 40 mg  Every 24 hours (non-standard times)         02/21/23 1151     IP VTE LOW RISK PATIENT  Once         02/19/23 1822                Discharge Planning   FLORINA: 2/23/2023     Code Status: Full Code   Is the patient medically ready for discharge?:     Reason for patient still in hospital (select all that apply): Patient trending condition  Discharge Plan A: Home                  Nayely Denson MD  Department of Hospital Medicine   [facility]

## 2023-02-22 NOTE — NURSING
42 yr old male says he has been camping   Bilat hips unstageable pressure injury         Multi sites of what looks to be bug bites and scratches                 Left elbow abrasion or rub     Recommendation:    Bilat hips Clean with chlorhexidine/ns. Pat dry  Apply santyl and cover with mepilex.    Bug bites and scratches  Clean with chlorhexidine/ns. Pat dry. Apply Calmoseptine and cover with mepilex if needed Daily.

## 2023-02-22 NOTE — NURSING
Report to 1100 nurse. Room still being cleaned and sitter will be there at 1800. When siiter arrives, nurse will call to let me know.

## 2023-02-22 NOTE — CARE UPDATE
Continue tx   02/22/23 0849   Patient Assessment/Suction   Level of Consciousness (AVPU) alert   Respiratory Effort Normal;Unlabored   Expansion/Accessory Muscles/Retractions no use of accessory muscles;expansion symmetric   All Lung Fields Breath Sounds coarse   Rhythm/Pattern, Respiratory unlabored;depth regular;no shortness of breath reported   Cough Frequency no cough   PRE-TX-O2   SpO2 96 %   Pulse 71   Resp 18   Aerosol Therapy   $ Aerosol Therapy Charges Aerosol Treatment   Daily Review of Necessity (SVN) completed   Respiratory Treatment Status (SVN) given   Treatment Route (SVN) mask;oxygen   Patient Position (SVN) HOB elevated   Post Treatment Assessment (SVN) breath sounds unchanged   Signs of Intolerance (SVN) none   Breath Sounds Post-Respiratory Treatment   Throughout All Fields Post-Treatment All Fields   Throughout All Fields Post-Treatment no change   Post-treatment Heart Rate (beats/min) 52   Post-treatment Resp Rate (breaths/min) 16

## 2023-02-22 NOTE — PLAN OF CARE
Problem: Violence Risk or Actual  Goal: Anger and Impulse Control  Outcome: Ongoing, Progressing

## 2023-02-22 NOTE — PLAN OF CARE
Plan of care reviewed with patient. Safety precautions maintained. Pt remains free from injury. One on one continuous monitoring. Pt reports feeling depressed today. Pain controlled with prn medication. C/o nausea x1, relief with prn zofran. Ambulates stand by assist.  Voids in urinal. Strict I&O. IVF continued. K & Mag replaced. Protonix gtt stopped. NSR on tele monitor. Esophagram today. Diet advanced. Stool sent for cx and cdif r/o. Isolation precautions initiated. Vital signs stable. Afebrile. AAOx4. Pt reeducated multiple times on the importance of not jumping out of bed. Bed alarm on.

## 2023-02-22 NOTE — PROGRESS NOTES
INPATIENT NEPHROLOGY PROGRESS NOTE  [facility] NEPHROLOGY    Elder Rosales  02/22/2023    Reason for consultation:    Hyponatremia, maria t    Chief Complaint:   Chief Complaint   Patient presents with    Hip Pain     LEFT, CAMPING IN WOODS X COUPLE DAYS, STATES FELL COUPLE TIMES    Vomiting    Dizziness     X 2-3 DAYS          History of Present Illness:    Per H and P    42-year-old male with history of anxiety, previous heavy tobacco and alcohol use.  Patient states last alcohol use was proximally 2 months ago.  Used to smoke a pack a day also drank a case of beer daily for several years.  Patient presents emergency department with complaint of generalized upper lower extremity cramping abdominal pain, dizziness weakness with nausea which has been noted over last 3 days.  Patient states that he was camping in the woods and decided that he could not make it out of the woods due to feeling so weak.  Patient states that he was stumbling and had to wait in order to walk out of the roads.  He feels like his vertigo was acting up.  He denies fever, no headache, did endorse dizziness.  Patient decided to come to ER due to persistent symptoms.    2/19  c/o diffuse musculoskeletal pain.   He has pleurisy.  He is very hungry.  His vertigo is better.  No urinary complaints.  He states his stool is black.    2/20  VSS. Na+ up to 126, K+ 2.9.  renal function stable.  On NS, getting aggressive IV electrolyte repletion.  Will add on Mg+ level to last blood draw.  C/o abd pain with burning when he eats or drinks anything.  No other complaints.  2/21 plan for EGD tomorrow- starting PPI gtt  2/22 VSS, on RA, UOP 1.2L, going for esophogram today    Plan of Care:     Hyponatremia likely due to not eating for several days and drinking a lot of water  - serum Na is improved  - advance diet as tolerated    Hypokalemia/HypoMg  - on KCl gtt- ok with standing oral KCl regimen to get off gtt- replete Mg today  - continue q6 serum K until  hypoK resolved    JAIME  - resolved  - no evidence of ELKIN  - strict ins/outs  - no nsaids or IV contrast    Hypertension  - controlled on current regimen- ok to resume ACE if BP > 140/90    Thank you for allowing us to participate in this patient's care. We will continue to follow.    Vital Signs:  Temp Readings from Last 3 Encounters:   02/22/23 97.9 °F (36.6 °C) (Oral)   04/15/22 99.8 °F (37.7 °C) (Oral)   02/04/22 98.2 °F (36.8 °C) (Oral)       Pulse Readings from Last 3 Encounters:   02/22/23 (!) 59   04/15/22 76   02/04/22 105       BP Readings from Last 3 Encounters:   02/22/23 116/76   04/15/22 133/84   02/04/22 (!) 150/95       Weight:  Wt Readings from Last 3 Encounters:   02/21/23 84.2 kg (185 lb 10 oz)   04/15/22 83.5 kg (184 lb)   01/17/22 102.1 kg (225 lb)     Medications:  No current facility-administered medications on file prior to encounter.     Current Outpatient Medications on File Prior to Encounter   Medication Sig Dispense Refill    amLODIPine (NORVASC) 5 MG tablet Take 5 mg by mouth once daily.      clonazePAM (KLONOPIN) 1 MG tablet Take 1 mg by mouth daily as needed.      QUEtiapine (SEROQUEL) 100 MG Tab Take 100 mg by mouth every evening.      buprenorphine-naloxone 8-2 (SUBOXONE) 8-2 mg Subl buprenorphine 8 mg-naloxone 2 mg sublingual tablet   DISSOLVE THREE TABLETS UNDER THE TONGUE ONCE DAILY      buprenorphine-naloxone 8-2 mg (SUBOXONE) 8-2 mg Place under the tongue.      buPROPion (WELLBUTRIN XL) 150 MG TB24 tablet Take 150 mg by mouth every morning.      busPIRone (BUSPAR) 10 MG tablet buspirone 10 mg tablet   TAKE ONE TABLET BY MOUTH TWICE DAILY      carvediloL (COREG) 3.125 MG tablet Take 3.125 mg by mouth 2 (two) times daily.      cetirizine (ZYRTEC) 10 MG tablet Take 1 tablet (10 mg total) by mouth once daily. 30 tablet 0    clonazePAM (KLONOPIN) 2 MG Tab Take 2 mg by mouth 2 (two) times daily.      dexchlorphen-phenylephrine-DM (POLYTUSSIN DM) 1-5-10 mg/5 mL Syrp Take 5 mLs by mouth  every 4 (four) hours as needed. 120 mL 0    DULoxetine (CYMBALTA) 30 MG capsule duloxetine 30 mg capsule,delayed release   TAKE ONE CAPSULE BY MOUTH ONCE DAILY      fluticasone propionate (FLONASE) 50 mcg/actuation nasal spray 1 spray (50 mcg total) by Each Nostril route once daily. 15.8 mL 0    gabapentin (NEURONTIN) 600 MG tablet Take 1 tablet (600 mg total) by mouth once daily. 90 tablet 3    gabapentin (NEURONTIN) 800 MG tablet gabapentin 800 mg tablet   TAKE 1 TABLET BY MOUTH THREE TIMES DAILY      lisinopriL (PRINIVIL,ZESTRIL) 5 MG tablet lisinopril 5 mg tablet   TAKE 1 TABLET BY MOUTH EVERY DAY      lisinopriL 10 MG tablet lisinopril 10 mg tablet   TAKE ONE TABLET BY MOUTH ONCE DAILY      meclizine (ANTIVERT) 25 mg tablet Take 1 tablet (25 mg total) by mouth 3 (three) times daily as needed for Dizziness. 20 tablet 0    ondansetron (ZOFRAN-ODT) 4 MG TbDL Take 1 tablet (4 mg total) by mouth every 8 (eight) hours as needed (nausea). 20 tablet 0    QUEtiapine (SEROQUEL) 50 MG tablet quetiapine 50 mg tablet   TAKE 1 TABLET BY MOUTH EVERY DAY AS DIRECTED      traZODone (DESYREL) 100 MG tablet TAKE 1/2 TO 1 TABLET BY MOUTH EVERY EVENING       Scheduled Meds:   budesonide  0.5 mg Nebulization Q12H    And    arformoteroL  15 mcg Nebulization BID    chlorhexidine  15 mL Mouth/Throat BID    enoxparin  40 mg Subcutaneous Q24H    gabapentin  800 mg Oral QHS    mupirocin   Nasal BID    potassium chloride  40 mEq Oral BID    QUEtiapine  100 mg Oral QHS     Continuous Infusions:   0/9% NACL & POTASSIUM CHLORIDE 20 MEQ/L 100 mL/hr at 02/22/23 0108    pantoprazole (PROTONIX) IV infusion 8 mg/hr (02/22/23 0358)     PRN Meds:.    Review of Systems:  Neg    Physical Exam:    /76   Pulse (!) 59   Temp 97.9 °F (36.6 °C) (Oral)   Resp 16   Ht 6' (1.829 m)   Wt 84.2 kg (185 lb 10 oz)   SpO2 97%   BMI 25.18 kg/m²     Constitutional: nad, aao x 3  Heart: rrr, no m/r/g, wwp, no edema  Lungs: ctab, no w/r/r/c, no  lb  Abdomen: s/nt/nd, +BS    Results:  Lab Results   Component Value Date     (L) 02/22/2023    K 3.1 (L) 02/22/2023    CL 94 (L) 02/22/2023    CO2 27 02/22/2023    BUN 7 02/22/2023    CREATININE 0.7 02/22/2023    CALCIUM 8.8 02/22/2023    ANIONGAP 11 02/22/2023    ESTGFRAFRICA >60 02/04/2022    EGFRNONAA >60 02/04/2022       Lab Results   Component Value Date    CALCIUM 8.8 02/22/2023    PHOS 3.9 10/04/2017       Recent Labs   Lab 02/22/23  0249   WBC 13.56*   RBC 3.89*   HGB 11.9*   HCT 33.8*      MCV 87   MCH 30.6   MCHC 35.2            I have personally reviewed pertinent radiological imaging and reports.    Patient care was time spent personally by me on the following activities: > 35 min  Obtaining a history  Examination of patient.  Providing medical care at the patients bedside.  Developing a treatment plan with patient or surrogate and bedside caregivers  Ordering and reviewing laboratory studies, radiographic studies, pulse oximetry.  Ordering and performing treatments and interventions.  Evaluation of patient's response to treatment.  Discussions with consultants while on the unit and immediately available to the patient.  Re-evaluation of the patient's condition.  Documentation in the medical record.     Nicole Bustos MD MPH  [facility]  [address] [place] [address]  [place] [address]  [phone] (p)  [phone] (f)

## 2023-02-22 NOTE — CARE UPDATE
02/21/23 1921   Patient Assessment/Suction   Level of Consciousness (AVPU) alert   Respiratory Effort Normal;Unlabored   Expansion/Accessory Muscles/Retractions no use of accessory muscles   All Lung Fields Breath Sounds equal bilaterally;clear   Rhythm/Pattern, Respiratory unlabored   Cough Frequency no cough   PRE-TX-O2   Device (Oxygen Therapy) room air   SpO2 100 %   Pulse Oximetry Type Continuous   $ Pulse Oximetry - Multiple Charge Pulse Oximetry - Multiple   Pulse 60   Resp 18   Aerosol Therapy   $ Aerosol Therapy Charges Aerosol Treatment   Daily Review of Necessity (SVN) completed   Respiratory Treatment Status (SVN) given   Treatment Route (SVN) mask   Patient Position (SVN) HOB elevated   Post Treatment Assessment (SVN) breath sounds unchanged   Signs of Intolerance (SVN) none   Breath Sounds Post-Respiratory Treatment   Post-treatment Heart Rate (beats/min) 63   Post-treatment Resp Rate (breaths/min) 16   Education   $ Education Bronchodilator;15 min   Respiratory Evaluation   $ Care Plan Tech Time 15 min   $ Eval/Re-eval Charges Evaluation   Evaluation For New Orders

## 2023-02-22 NOTE — NURSING
Pt noted with intermittent drop in HR to 40's while awake. Pt asymptomatic. Denies any discomfort.  Dr. Short notified of the above and concerns  r/t administering Seroquel 100mg this hs. Order to place Seroquel on hold this pm. Pt in stable condition. Remains bradycardic. Safety maintained.

## 2023-02-22 NOTE — SUBJECTIVE & OBJECTIVE
Interval History:  Patient states he does feel improved today.  Mild epigastric discomfort with nausea but no emesis, states he does feel hungry and is asking for diet.  Two interval bowel movements.  Urinating well.  When asked states yes to suicidal ideation but he is not able to state what these involved, later he states just depression.  Overnight intermittently bradycardic but otherwise stable/asymptomatic, no further episodes today.  Labs with WBC 13, hemoglobin 11.9, platelet 159, potassium 3.1, sodium 132.  Esophagram negative.  Discussed with patient.  Discussed with nursing.    Review of Systems   Constitutional:  Negative for fever.   Gastrointestinal:  Positive for abdominal pain and nausea.   Genitourinary:  Negative for difficulty urinating.   Neurological:  Positive for light-headedness.   Psychiatric/Behavioral:  Positive for dysphoric mood. Negative for hallucinations.    Objective:     Vital Signs (Most Recent):  Temp: 97.9 °F (36.6 °C) (02/22/23 0717)  Pulse: 69 (02/22/23 1001)  Resp: 19 (02/22/23 1029)  BP: 117/80 (02/22/23 1001)  SpO2: 98 % (02/22/23 1001) Vital Signs (24h Range):  Temp:  [97.9 °F (36.6 °C)-98.6 °F (37 °C)] 97.9 °F (36.6 °C)  Pulse:  [49-71] 69  Resp:  [13-24] 19  SpO2:  [96 %-100 %] 98 %  BP: (116-145)/(70-98) 117/80     Weight: 84.2 kg (185 lb 10 oz)  Body mass index is 25.18 kg/m².    Intake/Output Summary (Last 24 hours) at 2/22/2023 1038  Last data filed at 2/22/2023 0544  Gross per 24 hour   Intake 3223.75 ml   Output 1250 ml   Net 1973.75 ml      Physical Exam  Vitals and nursing note reviewed.   Constitutional:       General: He is not in acute distress.     Appearance: He is not ill-appearing.      Comments: sitting in bed, cooperative   HENT:      Head: Normocephalic and atraumatic.      Mouth/Throat:      Mouth: Mucous membranes are moist.   Cardiovascular:      Rate and Rhythm: Normal rate and regular rhythm.   Pulmonary:      Effort: No respiratory distress.       Breath sounds: No wheezing.      Comments: On room air  Abdominal:      General: There is no distension.      Palpations: Abdomen is soft.      Tenderness: There is no guarding.   Skin:     General: Skin is warm and dry.   Neurological:      Mental Status: He is alert and oriented to person, place, and time.      Comments: Speech intact, interactive, moving all 4 extremities   Psychiatric:      Comments: no hallucination       Significant Labs: BMP:   Recent Labs   Lab 02/22/23  0249 02/22/23  0553   GLU 92  --    *  --    K 3.2* 3.1*   CL 94*  --    CO2 27  --    BUN 7  --    CREATININE 0.7  --    CALCIUM 8.8  --    MG 1.7  --      CBC:   Recent Labs   Lab 02/21/23  0511 02/22/23 0249   WBC 11.86 13.56*   HGB 11.8* 11.9*   HCT 32.0* 33.8*   * 159     CMP:   Recent Labs   Lab 02/21/23  0012 02/21/23  0511 02/21/23  1754 02/21/23  2350 02/22/23  0249 02/22/23  0553   * 131*  --   --  132*  --    K 3.0* 2.9*   < > 3.2* 3.2* 3.1*   CL 92* 92*  --   --  94*  --    CO2 24 25  --   --  27  --    GLU 98 100  --   --  92  --    BUN 13 12  --   --  7  --    CREATININE 0.8 0.7  --   --  0.7  --    CALCIUM 8.9 8.7  --   --  8.8  --    PROT  --  5.5*  --   --  6.0  --    ALBUMIN  --  3.1*  --   --  3.0*  --    BILITOT  --  1.2*  --   --  0.9  --    ALKPHOS  --  63  --   --  68  --    AST  --  52*  --   --  45*  --    ALT  --  55*  --   --  50*  --    ANIONGAP 14 14  --   --  11  --     < > = values in this interval not displayed.     Cardiac Markers: No results for input(s): CKMB, MYOGLOBIN, BNP, TROPISTAT in the last 48 hours.  Magnesium:   Recent Labs   Lab 02/21/23  0511 02/22/23  0249   MG 2.2 1.7       Significant Imaging: I have reviewed all pertinent imaging results/findings within the past 24 hours.

## 2023-02-23 PROBLEM — E87.6 HYPOKALEMIA: Status: RESOLVED | Noted: 2023-02-19 | Resolved: 2023-02-23

## 2023-02-23 PROBLEM — A49.8 CLOSTRIDIUM DIFFICILE INFECTION: Status: ACTIVE | Noted: 2023-02-23

## 2023-02-23 PROBLEM — K22.6 MALLORY-WEISS TEAR: Status: RESOLVED | Noted: 2023-02-21 | Resolved: 2023-02-23

## 2023-02-23 LAB
ALBUMIN SERPL BCP-MCNC: 2.9 G/DL (ref 3.5–5.2)
ALP SERPL-CCNC: 76 U/L (ref 55–135)
ALT SERPL W/O P-5'-P-CCNC: 42 U/L (ref 10–44)
ANION GAP SERPL CALC-SCNC: 8 MMOL/L (ref 8–16)
AST SERPL-CCNC: 34 U/L (ref 10–40)
BILIRUB SERPL-MCNC: 0.4 MG/DL (ref 0.1–1)
BUN SERPL-MCNC: 5 MG/DL (ref 6–20)
CALCIUM SERPL-MCNC: 9.2 MG/DL (ref 8.7–10.5)
CHLORIDE SERPL-SCNC: 95 MMOL/L (ref 95–110)
CO2 SERPL-SCNC: 30 MMOL/L (ref 23–29)
CREAT SERPL-MCNC: 0.7 MG/DL (ref 0.5–1.4)
ERYTHROCYTE [DISTWIDTH] IN BLOOD BY AUTOMATED COUNT: 15.4 % (ref 11.5–14.5)
EST. GFR  (NO RACE VARIABLE): >60 ML/MIN/1.73 M^2
FERRITIN SERPL-MCNC: 1226 NG/ML (ref 20–300)
FOLATE SERPL-MCNC: 3.3 NG/ML (ref 4–24)
GLUCOSE SERPL-MCNC: 141 MG/DL (ref 70–110)
HCT VFR BLD AUTO: 34.2 % (ref 40–54)
HGB BLD-MCNC: 12 G/DL (ref 14–18)
MAGNESIUM SERPL-MCNC: 1.9 MG/DL (ref 1.6–2.6)
MCH RBC QN AUTO: 30.8 PG (ref 27–31)
MCHC RBC AUTO-ENTMCNC: 35.1 G/DL (ref 32–36)
MCV RBC AUTO: 88 FL (ref 82–98)
PLATELET # BLD AUTO: 202 K/UL (ref 150–450)
PMV BLD AUTO: 10.6 FL (ref 9.2–12.9)
POTASSIUM SERPL-SCNC: 3.4 MMOL/L (ref 3.5–5.1)
POTASSIUM SERPL-SCNC: 3.9 MMOL/L (ref 3.5–5.1)
POTASSIUM SERPL-SCNC: 4 MMOL/L (ref 3.5–5.1)
PROT SERPL-MCNC: 5.9 G/DL (ref 6–8.4)
RBC # BLD AUTO: 3.89 M/UL (ref 4.6–6.2)
SODIUM SERPL-SCNC: 133 MMOL/L (ref 136–145)
VIT B12 SERPL-MCNC: >1500 PG/ML (ref 210–950)
WBC # BLD AUTO: 9.63 K/UL (ref 3.9–12.7)

## 2023-02-23 PROCEDURE — 99900031 HC PATIENT EDUCATION (STAT)

## 2023-02-23 PROCEDURE — 25000003 PHARM REV CODE 250: Performed by: NURSE PRACTITIONER

## 2023-02-23 PROCEDURE — 25000003 PHARM REV CODE 250: Performed by: INTERNAL MEDICINE

## 2023-02-23 PROCEDURE — 82746 ASSAY OF FOLIC ACID SERUM: CPT | Performed by: STUDENT IN AN ORGANIZED HEALTH CARE EDUCATION/TRAINING PROGRAM

## 2023-02-23 PROCEDURE — 94761 N-INVAS EAR/PLS OXIMETRY MLT: CPT

## 2023-02-23 PROCEDURE — 94640 AIRWAY INHALATION TREATMENT: CPT

## 2023-02-23 PROCEDURE — 82607 VITAMIN B-12: CPT | Performed by: STUDENT IN AN ORGANIZED HEALTH CARE EDUCATION/TRAINING PROGRAM

## 2023-02-23 PROCEDURE — 83735 ASSAY OF MAGNESIUM: CPT | Performed by: INTERNAL MEDICINE

## 2023-02-23 PROCEDURE — 94799 UNLISTED PULMONARY SVC/PX: CPT

## 2023-02-23 PROCEDURE — 63600175 PHARM REV CODE 636 W HCPCS: Performed by: INTERNAL MEDICINE

## 2023-02-23 PROCEDURE — 85027 COMPLETE CBC AUTOMATED: CPT | Performed by: STUDENT IN AN ORGANIZED HEALTH CARE EDUCATION/TRAINING PROGRAM

## 2023-02-23 PROCEDURE — 12000002 HC ACUTE/MED SURGE SEMI-PRIVATE ROOM

## 2023-02-23 PROCEDURE — 84132 ASSAY OF SERUM POTASSIUM: CPT | Performed by: INTERNAL MEDICINE

## 2023-02-23 PROCEDURE — 82728 ASSAY OF FERRITIN: CPT | Performed by: STUDENT IN AN ORGANIZED HEALTH CARE EDUCATION/TRAINING PROGRAM

## 2023-02-23 PROCEDURE — S0030 INJECTION, METRONIDAZOLE: HCPCS | Performed by: INTERNAL MEDICINE

## 2023-02-23 PROCEDURE — 36415 COLL VENOUS BLD VENIPUNCTURE: CPT | Performed by: STUDENT IN AN ORGANIZED HEALTH CARE EDUCATION/TRAINING PROGRAM

## 2023-02-23 PROCEDURE — 99900035 HC TECH TIME PER 15 MIN (STAT)

## 2023-02-23 PROCEDURE — 63600175 PHARM REV CODE 636 W HCPCS: Performed by: STUDENT IN AN ORGANIZED HEALTH CARE EDUCATION/TRAINING PROGRAM

## 2023-02-23 PROCEDURE — 80053 COMPREHEN METABOLIC PANEL: CPT | Performed by: STUDENT IN AN ORGANIZED HEALTH CARE EDUCATION/TRAINING PROGRAM

## 2023-02-23 PROCEDURE — 25000242 PHARM REV CODE 250 ALT 637 W/ HCPCS: Performed by: INTERNAL MEDICINE

## 2023-02-23 RX ORDER — FOLIC ACID 1 MG/1
1 TABLET ORAL DAILY
Status: DISCONTINUED | OUTPATIENT
Start: 2023-02-24 | End: 2023-03-04 | Stop reason: HOSPADM

## 2023-02-23 RX ORDER — POTASSIUM CHLORIDE 20 MEQ/1
40 TABLET, EXTENDED RELEASE ORAL DAILY
Status: DISCONTINUED | OUTPATIENT
Start: 2023-02-24 | End: 2023-02-24

## 2023-02-23 RX ORDER — LANOLIN ALCOHOL/MO/W.PET/CERES
400 CREAM (GRAM) TOPICAL DAILY
Status: DISCONTINUED | OUTPATIENT
Start: 2023-02-23 | End: 2023-02-23

## 2023-02-23 RX ADMIN — ENOXAPARIN SODIUM 40 MG: 100 INJECTION SUBCUTANEOUS at 09:02

## 2023-02-23 RX ADMIN — VANCOMYCIN HYDROCHLORIDE 125 MG: KIT at 12:02

## 2023-02-23 RX ADMIN — GABAPENTIN 800 MG: 300 CAPSULE ORAL at 08:02

## 2023-02-23 RX ADMIN — TRAMADOL HYDROCHLORIDE 50 MG: 50 TABLET, COATED ORAL at 06:02

## 2023-02-23 RX ADMIN — VANCOMYCIN HYDROCHLORIDE 125 MG: KIT at 11:02

## 2023-02-23 RX ADMIN — BUDESONIDE INHALATION 0.5 MG: 0.5 SUSPENSION RESPIRATORY (INHALATION) at 07:02

## 2023-02-23 RX ADMIN — MORPHINE SULFATE 3 MG: 4 INJECTION, SOLUTION INTRAMUSCULAR; INTRAVENOUS at 01:02

## 2023-02-23 RX ADMIN — METRONIDAZOLE 500 MG: 5 INJECTION, SOLUTION INTRAVENOUS at 04:02

## 2023-02-23 RX ADMIN — VANCOMYCIN HYDROCHLORIDE 125 MG: KIT at 06:02

## 2023-02-23 RX ADMIN — METRONIDAZOLE 500 MG: 5 INJECTION, SOLUTION INTRAVENOUS at 12:02

## 2023-02-23 RX ADMIN — METRONIDAZOLE 500 MG: 5 INJECTION, SOLUTION INTRAVENOUS at 10:02

## 2023-02-23 RX ADMIN — PANTOPRAZOLE SODIUM 40 MG: 40 TABLET, DELAYED RELEASE ORAL at 06:02

## 2023-02-23 RX ADMIN — FOLIC ACID 5 MG: 5 INJECTION, SOLUTION INTRAMUSCULAR; INTRAVENOUS; SUBCUTANEOUS at 08:02

## 2023-02-23 RX ADMIN — MORPHINE SULFATE 3 MG: 4 INJECTION, SOLUTION INTRAMUSCULAR; INTRAVENOUS at 06:02

## 2023-02-23 RX ADMIN — Medication 400 MG: at 12:02

## 2023-02-23 RX ADMIN — QUETIAPINE 100 MG: 100 TABLET ORAL at 11:02

## 2023-02-23 RX ADMIN — ARFORMOTEROL TARTRATE 15 MCG: 15 SOLUTION RESPIRATORY (INHALATION) at 07:02

## 2023-02-23 RX ADMIN — POTASSIUM CHLORIDE 40 MEQ: 1500 TABLET, EXTENDED RELEASE ORAL at 09:02

## 2023-02-23 RX ADMIN — TRAMADOL HYDROCHLORIDE 50 MG: 50 TABLET, COATED ORAL at 11:02

## 2023-02-23 NOTE — PROGRESS NOTES
[facility]  Adult Nutrition   Progress Note (Follow-Up)    SUMMARY     Recommendations  Recommendation/Intervention:   1) Continue Person diet.   2) Add 1pkt Banatrol to help with diarrhea.   3) RD will continue to monitor po intake/tolerance, labs, weight and will make recommendations PRN.  Goals: Po intake >75% of meals  Nutrition Goal Status: progressing towards goal    Dietitian Rounds Brief  Patient is tolerating meals well and diet advanced to Person. He c/o nausea occasional but no vomiting or abd pain. LBM: 2/23/23.    Diet order:   Current Diet Order: Person     Evaluation of Received Nutrient/Fluid Intake  Energy Calories Required: meeting needs  Protein Required: meeting needs  Fluid Required: meeting needs  Tolerance: tolerating     % Intake of Estimated Energy Needs: 50 - 75 %  % Meal Intake: 50 - 75 %      Intake/Output Summary (Last 24 hours) at 2/23/2023 1633  Last data filed at 2/23/2023 0856  Gross per 24 hour   Intake 2480 ml   Output 3 ml   Net 2477 ml        Anthropometrics  Temp: 98.6 °F (37 °C)  Height Method: Stated  Height: 6' (182.9 cm)  Height (inches): 72 in  Weight Method: Bed Scale  Weight: 84.2 kg (185 lb 10 oz)  Weight (lb): 185.63 lb  Ideal Body Weight (IBW), Male: 178 lb  % Ideal Body Weight, Male (lb): 101.12 %  BMI (Calculated): 25.2  BMI Grade: 25 - 29.9 - overweight       Estimated/Assessed Needs  Weight Used For Calorie Calculations: 84 kg (185 lb 3 oz)  Energy Calorie Requirements (kcal): 2100-250 (25-30 kcal/kg)  Energy Need Method: Kcal/kg  Protein Requirements:  (1.0-1.5 gm/kg)  Weight Used For Protein Calculations: 84 kg (185 lb 3 oz)  Fluid Requirements (mL): 2940 (35 ml/kg)     RDA Method (mL): 2100       Reason for Assessment  Reason For Assessment: other (see comments) (ICU admit)  Diagnosis: other (see comments) (Acute pancreatitis)  Relevant Medical History: Polysubstance dependence, IVDU, hep C    Nutrition/Diet History  Food Allergies:  NKFA  Factors Affecting Nutritional Intake: NPO, altered gastrointestinal function    Nutrition Risk Screen  Nutrition Risk Screen: no indicators present       Altered Skin Integrity 02/21/23 0701 Right anterior Hip #1 Full thickness tissue loss. Base is covered by slough and/or eschar in the wound bed-Wound Image: Images linked       Altered Skin Integrity 02/21/23 0701 Left anterior Hip #2 Full thickness tissue loss. Base is covered by slough and/or eschar in the wound bed-Wound Image: Images linked  MST Score: 0  Have you recently lost weight without trying?: No  Weight loss score: 0  Have you been eating poorly because of a decreased appetite?: No  Appetite score: 0       Weight History:  Wt Readings from Last 10 Encounters:   02/21/23 84.2 kg (185 lb 10 oz)   04/15/22 83.5 kg (184 lb)   01/17/22 102.1 kg (225 lb)   11/06/21 102.1 kg (225 lb)   11/18/20 89.8 kg (198 lb)   09/02/20 96 kg (211 lb 9.6 oz)   08/20/20 99.3 kg (219 lb)   05/25/18 97.5 kg (215 lb)   05/20/18 97.5 kg (215 lb)   04/20/18 80.6 kg (177 lb 11.1 oz)        Lab/Procedures/Meds: Pertinent Labs/Meds Reviewed    Medications:Pertinent Medications Reviewed  Scheduled Meds:   budesonide  0.5 mg Nebulization Q12H    And    arformoteroL  15 mcg Nebulization BID    collagenase   Topical (Top) Daily    enoxparin  40 mg Subcutaneous Q24H    gabapentin  800 mg Oral QHS    metronidazole  500 mg Intravenous Q8H    pantoprazole  40 mg Oral Daily    [START ON 2/24/2023] potassium chloride  40 mEq Oral Daily    QUEtiapine  100 mg Oral QHS    vancomycin  125 mg Oral Q6H     Continuous Infusions:  PRN Meds:.acetaminophen, calcium gluconate IVPB, calcium gluconate IVPB, calcium gluconate IVPB, clonazePAM, influenza, loperamide, magnesium sulfate IVPB, magnesium sulfate IVPB, melatonin, ondansetron, potassium chloride **AND** potassium chloride **AND** potassium chloride, sodium chloride 0.9%, sodium phosphate IVPB, sodium phosphate IVPB, sodium phosphate IVPB,  [JVD] : no jugular venous distention  traMADoL    Labs: Pertinent Labs Reviewed  Clinical Chemistry:  Recent Labs   Lab 02/19/23  1000 02/19/23  1157 02/21/23  0511 02/21/23  1754 02/23/23  0543   *   < > 131*   < > 133*   K 2.6*   < > 2.9*   < > 4.0  3.9   CL 73*   < > 92*   < > 95   CO2 20*   < > 25   < > 30*   *   < > 100   < > 141*   BUN 25*   < > 12   < > 5*   CREATININE 1.4   < > 0.7   < > 0.7   CALCIUM 9.5   < > 8.7   < > 9.2   PROT 7.4  --  5.5*   < > 5.9*   ALBUMIN 4.0  --  3.1*   < > 2.9*   BILITOT 2.0*  --  1.2*   < > 0.4   ALKPHOS 71  --  63   < > 76   *  --  52*   < > 34   ALT 99*  --  55*   < > 42   ANIONGAP 27*   < > 14   < > 8   MG 1.8   < > 2.2   < > 1.9   LIPASE 569*  --  154*  --   --     < > = values in this interval not displayed.     CBC:   Recent Labs   Lab 02/23/23  0543   WBC 9.63   RBC 3.89*   HGB 12.0*   HCT 34.2*      MCV 88   MCH 30.8   MCHC 35.1     Lipid Panel:  No results for input(s): CHOL, HDL, LDLCALC, TRIG, CHOLHDL in the last 168 hours.  Cardiac Profile:  Recent Labs   Lab 02/19/23  1000   *   *     Inflammatory Labs:  No results for input(s): CRP in the last 168 hours.  Diabetes:  No results for input(s): HGBA1C, POCTGLUCOSE in the last 168 hours.  Thyroid & Parathyroid:  Recent Labs   Lab 02/21/23  0511   TSH 0.710       Monitor and Evaluation  Food and Nutrient Intake: energy intake, food and beverage intake  Food and Nutrient Adminstration: diet order  Knowledge/Beliefs/Attitudes: food and nutrition knowledge/skill  Physical Activity and Function: nutrition-related ADLs and IADLs  Anthropometric Measurements: weight, weight change, body mass index  Biochemical Data, Medical Tests and Procedures: electrolyte and renal panel, gastrointestinal profile, glucose/endocrine profile  Nutrition-Focused Physical Findings: overall appearance     Nutrition Risk  Level of Risk/Frequency of Follow-up: moderate     Nutrition Follow-Up  RD Follow-up?: Yes      Zahra Gaffney, RD  02/23/2023 4:33 PM        [No Rash or Lesion] : No rash or lesion [Purpura] : no purpura  [Petechiae] : no petechiae [Skin Ulcer] : no ulcer [Skin Induration] : no induration [Alert] : alert [Oriented to Person] : oriented to person [Oriented to Place] : oriented to place [Oriented to Time] : oriented to time [Calm] : calm [de-identified] : non toxic in no acute distress [de-identified] : NC/AT PERRL EOMI no scleral icterus [de-identified] : trachea midline no gross mass [de-identified] : no audible wheezing or stridor [de-identified] : mildly obese soft, no localizing tenderness, no guarding, no rebound, no masses [de-identified] : phallus normal, no testicular mass or tenderness [de-identified] : FROM of all extremities with no gross deformity, there remains no recurrent umbilical or inguinal hernias and no localizing tenderness, there is a new IR drain in the right groin area with no gross pus in the collection chamber  [de-identified] : surgical incisions are healed  [de-identified] : mood is calm

## 2023-02-23 NOTE — PROGRESS NOTES
INPATIENT NEPHROLOGY PROGRESS NOTE  [facility] NEPHROLOGY    Elder Rosales  02/23/2023    Reason for consultation:    Hyponatremia, maria t    Chief Complaint:   Chief Complaint   Patient presents with    Hip Pain     LEFT, CAMPING IN WOODS X COUPLE DAYS, STATES FELL COUPLE TIMES    Vomiting    Dizziness     X 2-3 DAYS          History of Present Illness:    Per H and P    42-year-old male with history of anxiety, previous heavy tobacco and alcohol use.  Patient states last alcohol use was proximally 2 months ago.  Used to smoke a pack a day also drank a case of beer daily for several years.  Patient presents emergency department with complaint of generalized upper lower extremity cramping abdominal pain, dizziness weakness with nausea which has been noted over last 3 days.  Patient states that he was camping in the woods and decided that he could not make it out of the woods due to feeling so weak.  Patient states that he was stumbling and had to wait in order to walk out of the roads.  He feels like his vertigo was acting up.  He denies fever, no headache, did endorse dizziness.  Patient decided to come to ER due to persistent symptoms.    2/19  c/o diffuse musculoskeletal pain.   He has pleurisy.  He is very hungry.  His vertigo is better.  No urinary complaints.  He states his stool is black.    2/20  VSS. Na+ up to 126, K+ 2.9.  renal function stable.  On NS, getting aggressive IV electrolyte repletion.  Will add on Mg+ level to last blood draw.  C/o abd pain with burning when he eats or drinks anything.  No other complaints.  2/21 plan for EGD tomorrow- starting PPI gtt  2/22 VSS, on RA, UOP 1.2L, going for esophogram today  2/23 has C diff    Plan of Care:     Hyponatremia likely due to not eating for several days and drinking a lot of water  - serum Na is improved  - advance diet as tolerated    Hypokalemia/HypoMg  - hypoK resolved- stop kcl gtt- change standing KCl to 40mEq daily]  - start mgOH 400mg  daily    JAIME  - resolved  - no evidence of ELKIN  - strict ins/outs  - no nsaids or IV contrast    Hypertension  - controlled on current regimen- ok to resume ACE if BP > 140/90    Thank you for allowing us to participate in this patient's care. We will continue to follow.    Vital Signs:  Temp Readings from Last 3 Encounters:   02/23/23 98.6 °F (37 °C)   04/15/22 99.8 °F (37.7 °C) (Oral)   02/04/22 98.2 °F (36.8 °C) (Oral)       Pulse Readings from Last 3 Encounters:   02/23/23 78   04/15/22 76   02/04/22 105       BP Readings from Last 3 Encounters:   02/23/23 130/64   04/15/22 133/84   02/04/22 (!) 150/95       Weight:  Wt Readings from Last 3 Encounters:   02/21/23 84.2 kg (185 lb 10 oz)   04/15/22 83.5 kg (184 lb)   01/17/22 102.1 kg (225 lb)     Medications:  No current facility-administered medications on file prior to encounter.     Current Outpatient Medications on File Prior to Encounter   Medication Sig Dispense Refill    amLODIPine (NORVASC) 5 MG tablet Take 5 mg by mouth once daily.      clonazePAM (KLONOPIN) 1 MG tablet Take 1 mg by mouth daily as needed.      QUEtiapine (SEROQUEL) 100 MG Tab Take 100 mg by mouth every evening.      buprenorphine-naloxone 8-2 (SUBOXONE) 8-2 mg Subl buprenorphine 8 mg-naloxone 2 mg sublingual tablet   DISSOLVE THREE TABLETS UNDER THE TONGUE ONCE DAILY      buprenorphine-naloxone 8-2 mg (SUBOXONE) 8-2 mg Place under the tongue.      buPROPion (WELLBUTRIN XL) 150 MG TB24 tablet Take 150 mg by mouth every morning.      busPIRone (BUSPAR) 10 MG tablet buspirone 10 mg tablet   TAKE ONE TABLET BY MOUTH TWICE DAILY      carvediloL (COREG) 3.125 MG tablet Take 3.125 mg by mouth 2 (two) times daily.      cetirizine (ZYRTEC) 10 MG tablet Take 1 tablet (10 mg total) by mouth once daily. 30 tablet 0    clonazePAM (KLONOPIN) 2 MG Tab Take 2 mg by mouth 2 (two) times daily.      dexchlorphen-phenylephrine-DM (POLYTUSSIN DM) 1-5-10 mg/5 mL Syrp Take 5 mLs by mouth every 4 (four) hours  as needed. 120 mL 0    DULoxetine (CYMBALTA) 30 MG capsule duloxetine 30 mg capsule,delayed release   TAKE ONE CAPSULE BY MOUTH ONCE DAILY      fluticasone propionate (FLONASE) 50 mcg/actuation nasal spray 1 spray (50 mcg total) by Each Nostril route once daily. 15.8 mL 0    gabapentin (NEURONTIN) 600 MG tablet Take 1 tablet (600 mg total) by mouth once daily. 90 tablet 3    gabapentin (NEURONTIN) 800 MG tablet gabapentin 800 mg tablet   TAKE 1 TABLET BY MOUTH THREE TIMES DAILY      lisinopriL (PRINIVIL,ZESTRIL) 5 MG tablet lisinopril 5 mg tablet   TAKE 1 TABLET BY MOUTH EVERY DAY      lisinopriL 10 MG tablet lisinopril 10 mg tablet   TAKE ONE TABLET BY MOUTH ONCE DAILY      meclizine (ANTIVERT) 25 mg tablet Take 1 tablet (25 mg total) by mouth 3 (three) times daily as needed for Dizziness. 20 tablet 0    ondansetron (ZOFRAN-ODT) 4 MG TbDL Take 1 tablet (4 mg total) by mouth every 8 (eight) hours as needed (nausea). 20 tablet 0    QUEtiapine (SEROQUEL) 50 MG tablet quetiapine 50 mg tablet   TAKE 1 TABLET BY MOUTH EVERY DAY AS DIRECTED      traZODone (DESYREL) 100 MG tablet TAKE 1/2 TO 1 TABLET BY MOUTH EVERY EVENING       Scheduled Meds:   budesonide  0.5 mg Nebulization Q12H    And    arformoteroL  15 mcg Nebulization BID    collagenase   Topical (Top) Daily    enoxparin  40 mg Subcutaneous Q24H    gabapentin  800 mg Oral QHS    metronidazole  500 mg Intravenous Q8H    pantoprazole  40 mg Oral Daily    [START ON 2/24/2023] potassium chloride  40 mEq Oral Daily    QUEtiapine  100 mg Oral QHS    vancomycin  125 mg Oral Q6H     Continuous Infusions:    PRN Meds:.    Review of Systems:  Neg    Physical Exam:    /64   Pulse 78   Temp 98.6 °F (37 °C)   Resp 16   Ht 6' (1.829 m)   Wt 84.2 kg (185 lb 10 oz)   SpO2 97%   BMI 25.18 kg/m²     Constitutional: nad, aao x 3  Heart: rrr, no m/r/g, wwp, no edema  Lungs: ctab, no w/r/r/c, no lb  Abdomen: s/nt/nd, +BS    Results:  Lab Results   Component Value Date      (L) 02/23/2023    K 4.0 02/23/2023    K 3.9 02/23/2023    CL 95 02/23/2023    CO2 30 (H) 02/23/2023    BUN 5 (L) 02/23/2023    CREATININE 0.7 02/23/2023    CALCIUM 9.2 02/23/2023    ANIONGAP 8 02/23/2023    ESTGFRAFRICA >60 02/04/2022    EGFRNONAA >60 02/04/2022       Lab Results   Component Value Date    CALCIUM 9.2 02/23/2023    PHOS 3.9 10/04/2017       Recent Labs   Lab 02/23/23  0543   WBC 9.63   RBC 3.89*   HGB 12.0*   HCT 34.2*      MCV 88   MCH 30.8   MCHC 35.1            I have personally reviewed pertinent radiological imaging and reports.    Patient care was time spent personally by me on the following activities: > 35 min  Obtaining a history  Examination of patient.  Providing medical care at the patients bedside.  Developing a treatment plan with patient or surrogate and bedside caregivers  Ordering and reviewing laboratory studies, radiographic studies, pulse oximetry.  Ordering and performing treatments and interventions.  Evaluation of patient's response to treatment.  Discussions with consultants while on the unit and immediately available to the patient.  Re-evaluation of the patient's condition.  Documentation in the medical record.     Nicole Bustos MD MPH  [facility]  [address] [place] [id number]  [phone] (p)  [phone] (f)

## 2023-02-23 NOTE — PLAN OF CARE
Pt remains in hospital due to medical condition. Pt not medically cleared for discharge to psych facility.       02/23/23 1019   Discharge Reassessment   Assessment Type Discharge Planning Reassessment   Did the patient's condition or plan change since previous assessment? No   Discharge Plan A Psychiatric hospital   Discharge Plan B Home   DME Needed Upon Discharge  none   Discharge Barriers Identified Mental illness   Why the patient remains in the hospital Requires continued medical care   Post-Acute Status   Coverage Payor:  MEDICAID - [facility]   Discharge Delays None known at this time

## 2023-02-23 NOTE — SUBJECTIVE & OBJECTIVE
Interval History:  No nausea or vomiting, tolerating 100% of oral diet.  Still reporting mild epigastric discomfort.  States he had 3 watery bowel movements as of today, nonbloody.  Afebrile with T-max 98.6°.  On room air.  Labs with WBC 9.63, hemoglobin 12, sodium 133, potassium 4, BUN/creatinine 5/0.7.  C diff positive.  Discussed with patient.  Sitter at bedside.    Review of Systems   Constitutional:  Negative for fever.   Gastrointestinal:  Positive for abdominal pain and diarrhea. Negative for nausea and vomiting.   Genitourinary:  Negative for difficulty urinating.   Neurological:  Positive for numbness (feet).   Objective:     Vital Signs (Most Recent):  Temp: 98.6 °F (37 °C) (02/23/23 1110)  Pulse: 71 (02/23/23 1110)  Resp: 16 (02/23/23 1352)  BP: 107/73 (02/23/23 1110)  SpO2: 96 % (02/23/23 1110) Vital Signs (24h Range):  Temp:  [97.8 °F (36.6 °C)-98.6 °F (37 °C)] 98.6 °F (37 °C)  Pulse:  [58-80] 71  Resp:  [14-23] 16  SpO2:  [96 %-100 %] 96 %  BP: ()/(64-89) 107/73     Weight: 84.2 kg (185 lb 10 oz)  Body mass index is 25.18 kg/m².    Intake/Output Summary (Last 24 hours) at 2/23/2023 1429  Last data filed at 2/23/2023 0856  Gross per 24 hour   Intake 2480 ml   Output 3 ml   Net 2477 ml      Physical Exam  Vitals and nursing note reviewed.   Constitutional:       General: He is not in acute distress.     Appearance: He is not ill-appearing.      Comments: Lying in bed, cooperative   HENT:      Head: Normocephalic and atraumatic.      Mouth/Throat:      Mouth: Mucous membranes are moist.   Cardiovascular:      Rate and Rhythm: Normal rate and regular rhythm.   Pulmonary:      Effort: No respiratory distress.      Breath sounds: No wheezing.      Comments: On room air  Abdominal:      General: There is no distension.      Palpations: Abdomen is soft.      Tenderness: There is no guarding.   Skin:     General: Skin is warm and dry.   Neurological:      Mental Status: He is alert and oriented to person,  place, and time.      Comments: Speech intact, interactive, moving all 4 extremities   Psychiatric:      Comments: no hallucination       Significant Labs: Blood Culture: No results for input(s): LABBLOO in the last 48 hours.  BMP:   Recent Labs   Lab 02/23/23  0543   *   *   K 4.0  3.9   CL 95   CO2 30*   BUN 5*   CREATININE 0.7   CALCIUM 9.2   MG 1.9     CBC:   Recent Labs   Lab 02/22/23 0249 02/23/23  0543   WBC 13.56* 9.63   HGB 11.9* 12.0*   HCT 33.8* 34.2*    202     CMP:   Recent Labs   Lab 02/22/23 0249 02/22/23  0553 02/22/23  1802 02/22/23 2359 02/23/23  0543   *  --   --   --  133*   K 3.2*   < > 3.6 3.4* 4.0  3.9   CL 94*  --   --   --  95   CO2 27  --   --   --  30*   GLU 92  --   --   --  141*   BUN 7  --   --   --  5*   CREATININE 0.7  --   --   --  0.7   CALCIUM 8.8  --   --   --  9.2   PROT 6.0  --   --   --  5.9*   ALBUMIN 3.0*  --   --   --  2.9*   BILITOT 0.9  --   --   --  0.4   ALKPHOS 68  --   --   --  76   AST 45*  --   --   --  34   ALT 50*  --   --   --  42   ANIONGAP 11  --   --   --  8    < > = values in this interval not displayed.     Magnesium:   Recent Labs   Lab 02/22/23 0249 02/23/23  0543   MG 1.7 1.9       Significant Imaging: I have reviewed all pertinent imaging results/findings within the past 24 hours.

## 2023-02-23 NOTE — PROGRESS NOTES
"[facility]  Progress Note    Patient Name: Elder Rosales  MRN: [id number]  Patient Class: IP- Inpatient   Admission Date: 2/19/2023  Length of Stay: 4 days  Attending Physician: Nayely Denson MD  Primary Care Provider: [facility]        Subjective:     Principal Problem:Clostridium difficile infection        HPI:  Patient is a 41 yo cauc male with hx ETOH, >20y/pack/d Reformed smoker, vertigo, ADHD, IVDU, Polysubstance abused,treated Hep c . He reports going camping and fishing 3 days ago and experienced vertigo, abdominal pain, N/V, falling and imbalance, hearing voices in his head and talking to himself. He report dark black stools for 4 days however hbg is stable. He states, " he stop use to smoke a pack/d, and drink 12-15 beers a day but stopped a month ago". While in the ED reported suicidal thoughts and depression and asked to speak with a psychiatrist. He was evaluated by telepsych. On assessment patient was fidgeting, difficulty focusing, not staying on track with questions asked and gaurded to left hip and back. He denied drug use however past documentation states iv drug user,and  polysubstance abuse. He denies chest pain, dysuria, fever, and chills.     Hospitalist asked to admit for hyponatermia , pancreatitis, enterocolitis, abd ct abnormality. Nephrology, GI consulted in ED.      Overview/Hospital Course:  Patient with a history of alcoholism, smoker, vertigo, history of IVDU but no recent use, treated hepatitis-C.  States he went out fishing/camping, believes he had an attack of vertigo, states subsequent day he was generally weak, it took him 4 days to come out of the camp.  States he was having dark stool.  He presented to the ED, expressed suicidal ideation on background history of depression, he was seen by tele psych kaylah Adams, recommends Seroquel 100 q.h.s., Remeron 15 mg q.h.s. Admission labs with " hyponatremia 120 with severe hypokalemia, transaminitis with elevation of lipase.  He was admitted to the ICU, GI and Nephrology consultation, IV fluid hydration, continuous PPI therapy, esophagram.  On 02/22, esophagram with no evidence of esophageal perforation or tears, starting liquid diet and advancing as tolerated, once medically cleared will need inpatient psychiatric placement.  On 02/22 C diff is positive and started on oral vancomycin.      Interval History:  No nausea or vomiting, tolerating 100% of oral diet.  Still reporting mild epigastric discomfort.  States he had 3 watery bowel movements as of today, nonbloody.  Afebrile with T-max 98.6°.  On room air.  Labs with WBC 9.63, hemoglobin 12, sodium 133, potassium 4, BUN/creatinine 5/0.7.  C diff positive.  Discussed with patient.  Sitter at bedside.    Review of Systems   Constitutional:  Negative for fever.   Gastrointestinal:  Positive for abdominal pain and diarrhea. Negative for nausea and vomiting.   Genitourinary:  Negative for difficulty urinating.   Neurological:  Positive for numbness (feet).   Objective:     Vital Signs (Most Recent):  Temp: 98.6 °F (37 °C) (02/23/23 1110)  Pulse: 71 (02/23/23 1110)  Resp: 16 (02/23/23 1352)  BP: 107/73 (02/23/23 1110)  SpO2: 96 % (02/23/23 1110) Vital Signs (24h Range):  Temp:  [97.8 °F (36.6 °C)-98.6 °F (37 °C)] 98.6 °F (37 °C)  Pulse:  [58-80] 71  Resp:  [14-23] 16  SpO2:  [96 %-100 %] 96 %  BP: ()/(64-89) 107/73     Weight: 84.2 kg (185 lb 10 oz)  Body mass index is 25.18 kg/m².    Intake/Output Summary (Last 24 hours) at 2/23/2023 1429  Last data filed at 2/23/2023 0856  Gross per 24 hour   Intake 2480 ml   Output 3 ml   Net 2477 ml      Physical Exam  Vitals and nursing note reviewed.   Constitutional:       General: He is not in acute distress.     Appearance: He is not ill-appearing.      Comments: Lying in bed, cooperative   HENT:      Head: Normocephalic and atraumatic.      Mouth/Throat:       Mouth: Mucous membranes are moist.   Cardiovascular:      Rate and Rhythm: Normal rate and regular rhythm.   Pulmonary:      Effort: No respiratory distress.      Breath sounds: No wheezing.      Comments: On room air  Abdominal:      General: There is no distension.      Palpations: Abdomen is soft.      Tenderness: There is no guarding.   Skin:     General: Skin is warm and dry.   Neurological:      Mental Status: He is alert and oriented to person, place, and time.      Comments: Speech intact, interactive, moving all 4 extremities   Psychiatric:      Comments: no hallucination       Significant Labs: Blood Culture: No results for input(s): LABBLOO in the last 48 hours.  BMP:   Recent Labs   Lab 02/23/23  0543   *   *   K 4.0  3.9   CL 95   CO2 30*   BUN 5*   CREATININE 0.7   CALCIUM 9.2   MG 1.9     CBC:   Recent Labs   Lab 02/22/23 0249 02/23/23  0543   WBC 13.56* 9.63   HGB 11.9* 12.0*   HCT 33.8* 34.2*    202     CMP:   Recent Labs   Lab 02/22/23  0249 02/22/23  0553 02/22/23  1802 02/22/23  2359 02/23/23  0543   *  --   --   --  133*   K 3.2*   < > 3.6 3.4* 4.0  3.9   CL 94*  --   --   --  95   CO2 27  --   --   --  30*   GLU 92  --   --   --  141*   BUN 7  --   --   --  5*   CREATININE 0.7  --   --   --  0.7   CALCIUM 8.8  --   --   --  9.2   PROT 6.0  --   --   --  5.9*   ALBUMIN 3.0*  --   --   --  2.9*   BILITOT 0.9  --   --   --  0.4   ALKPHOS 68  --   --   --  76   AST 45*  --   --   --  34   ALT 50*  --   --   --  42   ANIONGAP 11  --   --   --  8    < > = values in this interval not displayed.     Magnesium:   Recent Labs   Lab 02/22/23  0249 02/23/23  0543   MG 1.7 1.9       Significant Imaging: I have reviewed all pertinent imaging results/findings within the past 24 hours.      Assessment/Plan:      Active Hospital Problems    Diagnosis    *Clostridium difficile infection    Depression    Anemia    Hyponatremia    Suicidal ideation    Hepatitis C virus infection  cured after antiviral drug therapy     s/p epclusa, svr12 - 3/2021 (treated / cured)      Nicotine abuse     Plan:  Continue care on medical floor  Contact isolation with C diff   Continue oral vancomycin, IV Flagyl while hospitalized  40 mEq standing dose potassium supplementation   Check B12, folic acid and ferritin level  Patient suicidal ideation/depression, seen by Dr. Fountain, tele psych on presentation, recommends pec, Seroquel 100 mg q.h.s., mirtazapine 15 mg q.h.s., subsequently seen by Dr. Cole and [facility] for suicidal ideation on 2/20/23 at 1606 pm  [facility] safety precautions, protocol, one-to-one sitter  Electrolytes sliding scale repletion  Trending labs  Appreciate all consultant's input   Further plan as per hospital course   Once medically stable to transfer to inpatient psychiatric facility for ongoing care     VTE Risk Mitigation (From admission, onward)         Ordered     enoxaparin injection 40 mg  Every 24 hours (non-standard times)         02/21/23 1151     IP VTE LOW RISK PATIENT  Once         02/19/23 1822                Discharge Planning   FLORINA: 2/23/2023     Code Status: Full Code   Is the patient medically ready for discharge?:     Reason for patient still in hospital (select all that apply): Patient trending condition  Discharge Plan A: Psychiatric hospital   Discharge Delays: None known at this time              Nayely Denson MD  Department of Hospital Medicine   [facility]

## 2023-02-24 PROBLEM — E87.1 HYPONATREMIA: Status: RESOLVED | Noted: 2023-02-19 | Resolved: 2023-02-24

## 2023-02-24 PROBLEM — E53.8 FOLATE DEFICIENCY: Chronic | Status: ACTIVE | Noted: 2023-02-24

## 2023-02-24 PROBLEM — E83.39 HYPOPHOSPHATEMIA: Status: ACTIVE | Noted: 2023-02-24

## 2023-02-24 LAB
ANION GAP SERPL CALC-SCNC: 6 MMOL/L (ref 8–16)
BUN SERPL-MCNC: 9 MG/DL (ref 6–20)
CALCIUM SERPL-MCNC: 9.3 MG/DL (ref 8.7–10.5)
CHLORIDE SERPL-SCNC: 103 MMOL/L (ref 95–110)
CO2 SERPL-SCNC: 30 MMOL/L (ref 23–29)
CREAT SERPL-MCNC: 0.6 MG/DL (ref 0.5–1.4)
EST. GFR  (NO RACE VARIABLE): >60 ML/MIN/1.73 M^2
GLUCOSE SERPL-MCNC: 138 MG/DL (ref 70–110)
MAGNESIUM SERPL-MCNC: 1.7 MG/DL (ref 1.6–2.6)
PHOSPHATE SERPL-MCNC: 1.1 MG/DL (ref 2.7–4.5)
POTASSIUM SERPL-SCNC: 4.1 MMOL/L (ref 3.5–5.1)
SODIUM SERPL-SCNC: 139 MMOL/L (ref 136–145)
STOOL CULTURE: NORMAL
STOOL CULTURE: NORMAL

## 2023-02-24 PROCEDURE — 25000003 PHARM REV CODE 250: Performed by: NURSE PRACTITIONER

## 2023-02-24 PROCEDURE — 99900031 HC PATIENT EDUCATION (STAT)

## 2023-02-24 PROCEDURE — 25000003 PHARM REV CODE 250: Performed by: INTERNAL MEDICINE

## 2023-02-24 PROCEDURE — 25000242 PHARM REV CODE 250 ALT 637 W/ HCPCS: Performed by: INTERNAL MEDICINE

## 2023-02-24 PROCEDURE — 63600175 PHARM REV CODE 636 W HCPCS: Performed by: INTERNAL MEDICINE

## 2023-02-24 PROCEDURE — 84100 ASSAY OF PHOSPHORUS: CPT | Performed by: INTERNAL MEDICINE

## 2023-02-24 PROCEDURE — 80048 BASIC METABOLIC PNL TOTAL CA: CPT | Performed by: INTERNAL MEDICINE

## 2023-02-24 PROCEDURE — 94799 UNLISTED PULMONARY SVC/PX: CPT

## 2023-02-24 PROCEDURE — 90686 IIV4 VACC NO PRSV 0.5 ML IM: CPT | Performed by: INTERNAL MEDICINE

## 2023-02-24 PROCEDURE — 94640 AIRWAY INHALATION TREATMENT: CPT

## 2023-02-24 PROCEDURE — 94761 N-INVAS EAR/PLS OXIMETRY MLT: CPT

## 2023-02-24 PROCEDURE — 99900035 HC TECH TIME PER 15 MIN (STAT)

## 2023-02-24 PROCEDURE — S0030 INJECTION, METRONIDAZOLE: HCPCS | Performed by: INTERNAL MEDICINE

## 2023-02-24 PROCEDURE — 83735 ASSAY OF MAGNESIUM: CPT | Performed by: INTERNAL MEDICINE

## 2023-02-24 PROCEDURE — 36415 COLL VENOUS BLD VENIPUNCTURE: CPT | Performed by: INTERNAL MEDICINE

## 2023-02-24 PROCEDURE — 12000002 HC ACUTE/MED SURGE SEMI-PRIVATE ROOM

## 2023-02-24 PROCEDURE — 25000003 PHARM REV CODE 250: Performed by: STUDENT IN AN ORGANIZED HEALTH CARE EDUCATION/TRAINING PROGRAM

## 2023-02-24 PROCEDURE — 90471 IMMUNIZATION ADMIN: CPT | Performed by: INTERNAL MEDICINE

## 2023-02-24 RX ORDER — SODIUM,POTASSIUM PHOSPHATES 280-250MG
1 POWDER IN PACKET (EA) ORAL
Status: DISCONTINUED | OUTPATIENT
Start: 2023-02-24 | End: 2023-02-26

## 2023-02-24 RX ADMIN — METRONIDAZOLE 500 MG: 5 INJECTION, SOLUTION INTRAVENOUS at 11:02

## 2023-02-24 RX ADMIN — TRAMADOL HYDROCHLORIDE 50 MG: 50 TABLET, COATED ORAL at 07:02

## 2023-02-24 RX ADMIN — METRONIDAZOLE 500 MG: 5 INJECTION, SOLUTION INTRAVENOUS at 04:02

## 2023-02-24 RX ADMIN — POTASSIUM PHOSPHATE, MONOBASIC AND POTASSIUM PHOSPHATE, DIBASIC 30 MMOL: 224; 236 INJECTION, SOLUTION, CONCENTRATE INTRAVENOUS at 09:02

## 2023-02-24 RX ADMIN — POTASSIUM, SODIUM PHOSPHATES 280 MG-160 MG-250 MG ORAL POWDER PACKET 1 PACKET: POWDER IN PACKET at 05:02

## 2023-02-24 RX ADMIN — TRAMADOL HYDROCHLORIDE 50 MG: 50 TABLET, COATED ORAL at 12:02

## 2023-02-24 RX ADMIN — GABAPENTIN 800 MG: 300 CAPSULE ORAL at 05:02

## 2023-02-24 RX ADMIN — QUETIAPINE 100 MG: 100 TABLET ORAL at 11:02

## 2023-02-24 RX ADMIN — TRAMADOL HYDROCHLORIDE 50 MG: 50 TABLET, COATED ORAL at 06:02

## 2023-02-24 RX ADMIN — COLLAGENASE SANTYL: 250 OINTMENT TOPICAL at 09:02

## 2023-02-24 RX ADMIN — INFLUENZA VIRUS VACCINE 0.5 ML: 15; 15; 15; 15 SUSPENSION INTRAMUSCULAR at 05:02

## 2023-02-24 RX ADMIN — GABAPENTIN 800 MG: 300 CAPSULE ORAL at 11:02

## 2023-02-24 RX ADMIN — PANTOPRAZOLE SODIUM 40 MG: 40 TABLET, DELAYED RELEASE ORAL at 05:02

## 2023-02-24 RX ADMIN — VANCOMYCIN HYDROCHLORIDE 125 MG: KIT at 12:02

## 2023-02-24 RX ADMIN — VANCOMYCIN HYDROCHLORIDE 125 MG: KIT at 05:02

## 2023-02-24 RX ADMIN — VANCOMYCIN HYDROCHLORIDE 125 MG: KIT at 11:02

## 2023-02-24 RX ADMIN — METRONIDAZOLE 500 MG: 5 INJECTION, SOLUTION INTRAVENOUS at 05:02

## 2023-02-24 RX ADMIN — ARFORMOTEROL TARTRATE 15 MCG: 15 SOLUTION RESPIRATORY (INHALATION) at 08:02

## 2023-02-24 RX ADMIN — POTASSIUM, SODIUM PHOSPHATES 280 MG-160 MG-250 MG ORAL POWDER PACKET 1 PACKET: POWDER IN PACKET at 12:02

## 2023-02-24 RX ADMIN — FOLIC ACID 1 MG: 1 TABLET ORAL at 09:02

## 2023-02-24 RX ADMIN — BUDESONIDE INHALATION 0.5 MG: 0.5 SUSPENSION RESPIRATORY (INHALATION) at 08:02

## 2023-02-24 RX ADMIN — ENOXAPARIN SODIUM 40 MG: 100 INJECTION SUBCUTANEOUS at 08:02

## 2023-02-24 RX ADMIN — Medication 6 MG: at 12:02

## 2023-02-24 RX ADMIN — ACETAMINOPHEN 650 MG: 325 TABLET ORAL at 08:02

## 2023-02-24 NOTE — PLAN OF CARE
Per MD, patient not medically ready for discharge. Pt was positive for c. Difficile. CM to follow up.   02/24/23 5252   Post-Acute Status   Post-Acute Authorization Placement   Post-Acute Placement Status Pending medical clearance/testing   Coverage Payor:  MEDICAID - [place] [facility]   Discharge Delays None known at this time   Discharge Plan   Discharge Plan A Psychiatric hospital   Discharge Plan B Psychiatric hospital

## 2023-02-24 NOTE — PLAN OF CARE
Problem: Violence Risk or Actual  Goal: Anger and Impulse Control  Outcome: Ongoing, Progressing     Problem: Adult Inpatient Plan of Care  Goal: Plan of Care Review  Outcome: Ongoing, Progressing     Problem: Fluid and Electrolyte Imbalance (Acute Kidney Injury/Impairment)  Goal: Fluid and Electrolyte Balance  Outcome: Ongoing, Progressing     Problem: Oral Intake Inadequate (Acute Kidney Injury/Impairment)  Goal: Optimal Nutrition Intake  Outcome: Ongoing, Progressing     Problem: Renal Function Impairment (Acute Kidney Injury/Impairment)  Goal: Effective Renal Function  Outcome: Ongoing, Progressing     Problem: Suicide Risk  Goal: Absence of Self-Harm  Outcome: Ongoing, Progressing     Problem: Pain Acute  Goal: Acceptable Pain Control and Functional Ability  Outcome: Ongoing, Progressing     Problem: Skin Injury Risk Increased  Goal: Skin Health and Integrity  Outcome: Ongoing, Progressing     Problem: Impaired Wound Healing  Goal: Optimal Wound Healing  Outcome: Ongoing, Progressing     Problem: Infection  Goal: Absence of Infection Signs and Symptoms  Outcome: Ongoing, Progressing

## 2023-02-24 NOTE — SUBJECTIVE & OBJECTIVE
Interval History: Patient states he feels better today.  He tolerated breakfast.  He has had no bowel movements since awakening this morning.  Labs with potassium 4.1, phosphorus 1.1.  Vital stable.    Review of Systems   Constitutional:  Negative for fever.   Gastrointestinal:  Negative for nausea and vomiting.   Genitourinary:  Negative for difficulty urinating.   Psychiatric/Behavioral:  Negative for confusion.    Objective:     Vital Signs (Most Recent):  Temp: 97.3 °F (36.3 °C) (02/24/23 0700)  Pulse: 72 (02/24/23 0847)  Resp: 18 (02/24/23 0847)  BP: 108/82 (02/24/23 0700)  SpO2: 96 % (02/24/23 0850) Vital Signs (24h Range):  Temp:  [97.3 °F (36.3 °C)-99.5 °F (37.5 °C)] 97.3 °F (36.3 °C)  Pulse:  [58-78] 72  Resp:  [14-18] 18  SpO2:  [96 %-99 %] 96 %  BP: (103-128)/(73-90) 108/82     Weight: 84.2 kg (185 lb 10 oz)  Body mass index is 25.18 kg/m².    Intake/Output Summary (Last 24 hours) at 2/24/2023 1018  Last data filed at 2/24/2023 0356  Gross per 24 hour   Intake 850 ml   Output 700 ml   Net 150 ml      Physical Exam  Vitals and nursing note reviewed.   Constitutional:       General: He is not in acute distress.     Appearance: He is not ill-appearing.      Comments: Lying in bed, cooperative   HENT:      Head: Normocephalic and atraumatic.      Mouth/Throat:      Mouth: Mucous membranes are moist.   Cardiovascular:      Rate and Rhythm: Normal rate and regular rhythm.   Pulmonary:      Effort: No respiratory distress.      Breath sounds: No wheezing.      Comments: On room air  Abdominal:      General: There is no distension.      Palpations: Abdomen is soft.      Tenderness: There is no guarding.   Skin:     General: Skin is warm and dry.   Neurological:      Mental Status: He is alert and oriented to person, place, and time.      Comments: Speech intact, interactive, moving all 4 extremities   Psychiatric:      Comments: no hallucination       Significant Labs: BMP:   Recent Labs   Lab 02/24/23  0517    *      K 4.1      CO2 30*   BUN 9   CREATININE 0.6   CALCIUM 9.3   MG 1.7     CBC:   Recent Labs   Lab 02/23/23  0543   WBC 9.63   HGB 12.0*   HCT 34.2*        CMP:   Recent Labs   Lab 02/22/23  2359 02/23/23  0543 02/24/23  0559   NA  --  133* 139   K 3.4* 4.0  3.9 4.1   CL  --  95 103   CO2  --  30* 30*   GLU  --  141* 138*   BUN  --  5* 9   CREATININE  --  0.7 0.6   CALCIUM  --  9.2 9.3   PROT  --  5.9*  --    ALBUMIN  --  2.9*  --    BILITOT  --  0.4  --    ALKPHOS  --  76  --    AST  --  34  --    ALT  --  42  --    ANIONGAP  --  8 6*     Cardiac Markers: No results for input(s): CKMB, MYOGLOBIN, BNP, TROPISTAT in the last 48 hours.  Lactic Acid: No results for input(s): LACTATE in the last 48 hours.  Magnesium:   Recent Labs   Lab 02/23/23  0543 02/24/23  0559   MG 1.9 1.7       Significant Imaging: I have reviewed all pertinent imaging results/findings within the past 24 hours.

## 2023-02-24 NOTE — PROGRESS NOTES
INPATIENT NEPHROLOGY PROGRESS NOTE  [facility] NEPHROLOGY    Elder Rosales  02/24/2023    Reason for consultation:    Hyponatremia, maria t    Chief Complaint:   Chief Complaint   Patient presents with    Hip Pain     LEFT, CAMPING IN WOODS X COUPLE DAYS, STATES FELL COUPLE TIMES    Vomiting    Dizziness     X 2-3 DAYS          History of Present Illness:    Per H and P    42-year-old male with history of anxiety, previous heavy tobacco and alcohol use.  Patient states last alcohol use was proximally 2 months ago.  Used to smoke a pack a day also drank a case of beer daily for several years.  Patient presents emergency department with complaint of generalized upper lower extremity cramping abdominal pain, dizziness weakness with nausea which has been noted over last 3 days.  Patient states that he was camping in the woods and decided that he could not make it out of the woods due to feeling so weak.  Patient states that he was stumbling and had to wait in order to walk out of the roads.  He feels like his vertigo was acting up.  He denies fever, no headache, did endorse dizziness.  Patient decided to come to ER due to persistent symptoms.    2/19  c/o diffuse musculoskeletal pain.   He has pleurisy.  He is very hungry.  His vertigo is better.  No urinary complaints.  He states his stool is black.    2/20  VSS. Na+ up to 126, K+ 2.9.  renal function stable.  On NS, getting aggressive IV electrolyte repletion.  Will add on Mg+ level to last blood draw.  C/o abd pain with burning when he eats or drinks anything.  No other complaints.  2/21 plan for EGD tomorrow- starting PPI gtt  2/22 VSS, on RA, UOP 1.2L, going for esophogram today  2/23 has C diff  2/24 no complaints    Plan of Care:     Hyponatremia likely due to not eating for several days and drinking a lot of water  - serum Na is normal    Hypokalemia/HypoMg  - hypoK resolved-- continue standing KCl 40mEq daily until K >/= 4.5  - continue mgOH 400mg daily- aim  for Mg 2    Hypophosphatemia  - getting K phos repletion    JAIME  - resolved  - no evidence of ELKIN  - strict ins/outs  - no nsaids or IV contrast    Hypertension  - controlled on current regimen- ok to resume ACE if BP > 140/90    Will sign off. Please call with questions.    Thank you for allowing us to participate in this patient's care.     Vital Signs:  Temp Readings from Last 3 Encounters:   02/24/23 97.3 °F (36.3 °C)   04/15/22 99.8 °F (37.7 °C) (Oral)   02/04/22 98.2 °F (36.8 °C) (Oral)       Pulse Readings from Last 3 Encounters:   02/24/23 72   04/15/22 76   02/04/22 105       BP Readings from Last 3 Encounters:   02/24/23 108/82   04/15/22 133/84   02/04/22 (!) 150/95       Weight:  Wt Readings from Last 3 Encounters:   02/21/23 84.2 kg (185 lb 10 oz)   04/15/22 83.5 kg (184 lb)   01/17/22 102.1 kg (225 lb)     Medications:  No current facility-administered medications on file prior to encounter.     Current Outpatient Medications on File Prior to Encounter   Medication Sig Dispense Refill    amLODIPine (NORVASC) 5 MG tablet Take 5 mg by mouth once daily.      clonazePAM (KLONOPIN) 1 MG tablet Take 1 mg by mouth daily as needed.      QUEtiapine (SEROQUEL) 100 MG Tab Take 100 mg by mouth every evening.      buprenorphine-naloxone 8-2 (SUBOXONE) 8-2 mg Subl buprenorphine 8 mg-naloxone 2 mg sublingual tablet   DISSOLVE THREE TABLETS UNDER THE TONGUE ONCE DAILY      buprenorphine-naloxone 8-2 mg (SUBOXONE) 8-2 mg Place under the tongue.      buPROPion (WELLBUTRIN XL) 150 MG TB24 tablet Take 150 mg by mouth every morning.      busPIRone (BUSPAR) 10 MG tablet buspirone 10 mg tablet   TAKE ONE TABLET BY MOUTH TWICE DAILY      carvediloL (COREG) 3.125 MG tablet Take 3.125 mg by mouth 2 (two) times daily.      cetirizine (ZYRTEC) 10 MG tablet Take 1 tablet (10 mg total) by mouth once daily. 30 tablet 0    clonazePAM (KLONOPIN) 2 MG Tab Take 2 mg by mouth 2 (two) times daily.      dexchlorphen-phenylephrine-DM  (POLYTUSSIN DM) 1-5-10 mg/5 mL Syrp Take 5 mLs by mouth every 4 (four) hours as needed. 120 mL 0    DULoxetine (CYMBALTA) 30 MG capsule duloxetine 30 mg capsule,delayed release   TAKE ONE CAPSULE BY MOUTH ONCE DAILY      fluticasone propionate (FLONASE) 50 mcg/actuation nasal spray 1 spray (50 mcg total) by Each Nostril route once daily. 15.8 mL 0    gabapentin (NEURONTIN) 600 MG tablet Take 1 tablet (600 mg total) by mouth once daily. 90 tablet 3    gabapentin (NEURONTIN) 800 MG tablet gabapentin 800 mg tablet   TAKE 1 TABLET BY MOUTH THREE TIMES DAILY      lisinopriL (PRINIVIL,ZESTRIL) 5 MG tablet lisinopril 5 mg tablet   TAKE 1 TABLET BY MOUTH EVERY DAY      lisinopriL 10 MG tablet lisinopril 10 mg tablet   TAKE ONE TABLET BY MOUTH ONCE DAILY      meclizine (ANTIVERT) 25 mg tablet Take 1 tablet (25 mg total) by mouth 3 (three) times daily as needed for Dizziness. 20 tablet 0    ondansetron (ZOFRAN-ODT) 4 MG TbDL Take 1 tablet (4 mg total) by mouth every 8 (eight) hours as needed (nausea). 20 tablet 0    QUEtiapine (SEROQUEL) 50 MG tablet quetiapine 50 mg tablet   TAKE 1 TABLET BY MOUTH EVERY DAY AS DIRECTED      traZODone (DESYREL) 100 MG tablet TAKE 1/2 TO 1 TABLET BY MOUTH EVERY EVENING       Scheduled Meds:   budesonide  0.5 mg Nebulization Q12H    And    arformoteroL  15 mcg Nebulization BID    collagenase   Topical (Top) Daily    enoxparin  40 mg Subcutaneous Q24H    folic acid  1 mg Oral Daily    gabapentin  800 mg Oral QHS    metronidazole  500 mg Intravenous Q8H    pantoprazole  40 mg Oral Daily    potassium phosphate IVPB  30 mmol Intravenous Once    potassium, sodium phosphates  1 packet Oral QID (WM & HS)    QUEtiapine  100 mg Oral QHS    vancomycin  125 mg Oral Q6H     Continuous Infusions:    PRN Meds:.    Review of Systems:  Neg    Physical Exam:    /82   Pulse 72   Temp 97.3 °F (36.3 °C)   Resp 18   Ht 6' (1.829 m)   Wt 84.2 kg (185 lb 10 oz)   SpO2 96%   BMI 25.18 kg/m²      Constitutional: nad, aao x 3  Heart: rrr, no m/r/g, wwp, no edema  Lungs: ctab, no w/r/r/c, no lb  Abdomen: s/nt/nd, +BS    Results:  Lab Results   Component Value Date     02/24/2023    K 4.1 02/24/2023     02/24/2023    CO2 30 (H) 02/24/2023    BUN 9 02/24/2023    CREATININE 0.6 02/24/2023    CALCIUM 9.3 02/24/2023    ANIONGAP 6 (L) 02/24/2023    ESTGFRAFRICA >60 02/04/2022    EGFRNONAA >60 02/04/2022       Lab Results   Component Value Date    CALCIUM 9.3 02/24/2023    PHOS 1.1 (L) 02/24/2023       No results for input(s): WBC, RBC, HGB, HCT, PLT, MCV, MCH, MCHC in the last 24 hours.         I have personally reviewed pertinent radiological imaging and reports.    Patient care was time spent personally by me on the following activities: > 35 min  Obtaining a history  Examination of patient.  Providing medical care at the patients bedside.  Developing a treatment plan with patient or surrogate and bedside caregivers  Ordering and reviewing laboratory studies, radiographic studies, pulse oximetry.  Ordering and performing treatments and interventions.  Evaluation of patient's response to treatment.  Discussions with consultants while on the unit and immediately available to the patient.  Re-evaluation of the patient's condition.  Documentation in the medical record.     Nicole Bustos MD MPH  [facility]  [address] [place] [id number]  [phone] (p)  [phone] (f)

## 2023-02-24 NOTE — CARE UPDATE
02/24/23 0850   Patient Assessment/Suction   Level of Consciousness (AVPU) alert   Respiratory Effort Normal;Unlabored   Expansion/Accessory Muscles/Retractions no use of accessory muscles;no retractions   All Lung Fields Breath Sounds clear   PRE-TX-O2   Device (Oxygen Therapy) room air   SpO2 96 %   Pulse Oximetry Type Intermittent   $ Pulse Oximetry - Multiple Charge Pulse Oximetry - Multiple   Aerosol Therapy   $ Aerosol Therapy Charges Aerosol Treatment   Daily Review of Necessity (SVN) completed   Respiratory Treatment Status (SVN) given   Treatment Route (SVN) mask;oxygen   Patient Position (SVN) semi-Gerard's   Post Treatment Assessment (SVN) breath sounds unchanged   Signs of Intolerance (SVN) none   Education   $ Education Bronchodilator;15 min   Respiratory Evaluation   $ Care Plan Tech Time 15 min   $ Eval/Re-eval Charges Re-evaluation

## 2023-02-24 NOTE — PROGRESS NOTES
"[facility] Medicine  Progress Note    Patient Name: Elder Rosales  MRN: [id number]  Patient Class: IP- Inpatient   Admission Date: 2/19/2023  Length of Stay: 5 days  Attending Physician: Nayely Denson MD  Primary Care Provider: [facility]        Subjective:     Principal Problem:Clostridium difficile infection        HPI:  Patient is a 43 yo cauc male with hx ETOH, >20y/pack/d Reformed smoker, vertigo, ADHD, IVDU, Polysubstance abused,treated Hep c . He reports going camping and fishing 3 days ago and experienced vertigo, abdominal pain, N/V, falling and imbalance, hearing voices in his head and talking to himself. He report dark black stools for 4 days however hbg is stable. He states, " he stop use to smoke a pack/d, and drink 12-15 beers a day but stopped a month ago". While in the ED reported suicidal thoughts and depression and asked to speak with a psychiatrist. He was evaluated by telepsych. On assessment patient was fidgeting, difficulty focusing, not staying on track with questions asked and gaurded to left hip and back. He denied drug use however past documentation states iv drug user,and  polysubstance abuse. He denies chest pain, dysuria, fever, and chills.     Hospitalist asked to admit for hyponatermia , pancreatitis, enterocolitis, abd ct abnormality. Nephrology, GI consulted in ED.      Overview/Hospital Course:  Patient with a history of alcoholism, smoker, vertigo, history of IVDU but no recent use, treated hepatitis-C.  States he went out fishing/camping, believes he had an attack of vertigo, states subsequent day he was generally weak, it took him 4 days to come out of the camp.  States he was having dark stool.  He presented to the ED, expressed suicidal ideation on background history of depression, he was seen by tele psych kaylah Adams, recommends Seroquel 100 q.h.s., Remeron 15 mg q.h.s. Admission labs with " hyponatremia 120 with severe hypokalemia, transaminitis with elevation of lipase.  He was admitted to the ICU, GI and Nephrology consultation, IV fluid hydration, continuous PPI therapy, esophagram.  On 02/22, esophagram with no evidence of esophageal perforation or tears, starting liquid diet and advancing as tolerated, once medically cleared will need inpatient psychiatric placement.  On 02/22 C diff is positive and started on oral vancomycin.      Interval History: Patient states he feels better today.  He tolerated breakfast.  He has had no bowel movements since awakening this morning.  Labs with potassium 4.1, phosphorus 1.1.  Vital stable.    Review of Systems   Constitutional:  Negative for fever.   Gastrointestinal:  Negative for nausea and vomiting.   Genitourinary:  Negative for difficulty urinating.   Psychiatric/Behavioral:  Negative for confusion.    Objective:     Vital Signs (Most Recent):  Temp: 97.3 °F (36.3 °C) (02/24/23 0700)  Pulse: 72 (02/24/23 0847)  Resp: 18 (02/24/23 0847)  BP: 108/82 (02/24/23 0700)  SpO2: 96 % (02/24/23 0850) Vital Signs (24h Range):  Temp:  [97.3 °F (36.3 °C)-99.5 °F (37.5 °C)] 97.3 °F (36.3 °C)  Pulse:  [58-78] 72  Resp:  [14-18] 18  SpO2:  [96 %-99 %] 96 %  BP: (103-128)/(73-90) 108/82     Weight: 84.2 kg (185 lb 10 oz)  Body mass index is 25.18 kg/m².    Intake/Output Summary (Last 24 hours) at 2/24/2023 1018  Last data filed at 2/24/2023 0356  Gross per 24 hour   Intake 850 ml   Output 700 ml   Net 150 ml      Physical Exam  Vitals and nursing note reviewed.   Constitutional:       General: He is not in acute distress.     Appearance: He is not ill-appearing.      Comments: Lying in bed, cooperative   HENT:      Head: Normocephalic and atraumatic.      Mouth/Throat:      Mouth: Mucous membranes are moist.   Cardiovascular:      Rate and Rhythm: Normal rate and regular rhythm.   Pulmonary:      Effort: No respiratory distress.      Breath sounds: No wheezing.       Comments: On room air  Abdominal:      General: There is no distension.      Palpations: Abdomen is soft.      Tenderness: There is no guarding.   Skin:     General: Skin is warm and dry.   Neurological:      Mental Status: He is alert and oriented to person, place, and time.      Comments: Speech intact, interactive, moving all 4 extremities   Psychiatric:      Comments: no hallucination       Significant Labs: BMP:   Recent Labs   Lab 02/24/23  0559   *      K 4.1      CO2 30*   BUN 9   CREATININE 0.6   CALCIUM 9.3   MG 1.7     CBC:   Recent Labs   Lab 02/23/23  0543   WBC 9.63   HGB 12.0*   HCT 34.2*        CMP:   Recent Labs   Lab 02/22/23  2359 02/23/23  0543 02/24/23  0559   NA  --  133* 139   K 3.4* 4.0  3.9 4.1   CL  --  95 103   CO2  --  30* 30*   GLU  --  141* 138*   BUN  --  5* 9   CREATININE  --  0.7 0.6   CALCIUM  --  9.2 9.3   PROT  --  5.9*  --    ALBUMIN  --  2.9*  --    BILITOT  --  0.4  --    ALKPHOS  --  76  --    AST  --  34  --    ALT  --  42  --    ANIONGAP  --  8 6*     Cardiac Markers: No results for input(s): CKMB, MYOGLOBIN, BNP, TROPISTAT in the last 48 hours.  Lactic Acid: No results for input(s): LACTATE in the last 48 hours.  Magnesium:   Recent Labs   Lab 02/23/23  0543 02/24/23  0559   MG 1.9 1.7       Significant Imaging: I have reviewed all pertinent imaging results/findings within the past 24 hours.      Assessment/Plan:      Active Hospital Problems    Diagnosis    *Clostridium difficile infection    Folate deficiency    Hypophosphatemia    Depression    Anemia    Suicidal ideation    Hepatitis C virus infection cured after antiviral drug therapy     s/p epclusa, svr12 - 3/2021 (treated / cured)      Nicotine abuse     Plan:  Continue care on medical floor  Contact isolation with C diff, once stool is formed will discontinue isolation precaution   Continue oral vancomycin, IV Flagyl while hospitalized  Folic acid supplementation  Ordered IV and  phosphorus supplementation  Patient suicidal ideation/depression, seen by Dr. Fountain, tele psych on presentation, recommends pec, Seroquel 100 mg q.h.s., mirtazapine 15 mg q.h.s., subsequently seen by Dr. Cole and [facility] for suicidal ideation on 2/20/23 at 1606 pm  [facility] safety precautions, protocol, one-to-one sitter  Electrolytes sliding scale repletion  Trending labs  Appreciate all consultant's input   Further plan as per hospital course   Once medically stable to transfer to inpatient psychiatric facility for ongoing care       VTE Risk Mitigation (From admission, onward)         Ordered     enoxaparin injection 40 mg  Every 24 hours (non-standard times)         02/21/23 1151     IP VTE LOW RISK PATIENT  Once         02/19/23 1822                Discharge Planning   FLORINA: 2/25/2023     Code Status: Full Code   Is the patient medically ready for discharge?:     Reason for patient still in hospital (select all that apply): Patient trending condition  Discharge Plan A: Psychiatric hospital   Discharge Delays: None known at this time              Nayely Denson MD  Department of Hospital Medicine   [facility]

## 2023-02-25 PROBLEM — M79.671 PAIN IN BOTH FEET: Chronic | Status: ACTIVE | Noted: 2023-02-25

## 2023-02-25 PROBLEM — E83.42 HYPOMAGNESEMIA: Status: ACTIVE | Noted: 2023-02-25

## 2023-02-25 PROBLEM — R50.9 FEVER: Status: ACTIVE | Noted: 2023-02-25

## 2023-02-25 PROBLEM — M79.672 PAIN IN BOTH FEET: Chronic | Status: ACTIVE | Noted: 2023-02-25

## 2023-02-25 LAB
ANION GAP SERPL CALC-SCNC: 8 MMOL/L (ref 8–16)
BASOPHILS NFR BLD: 0 % (ref 0–1.9)
BILIRUB UR QL STRIP: NEGATIVE
BUN SERPL-MCNC: 12 MG/DL (ref 6–20)
CALCIUM SERPL-MCNC: 9.6 MG/DL (ref 8.7–10.5)
CHLORIDE SERPL-SCNC: 99 MMOL/L (ref 95–110)
CLARITY UR: CLEAR
CO2 SERPL-SCNC: 31 MMOL/L (ref 23–29)
COLOR UR: YELLOW
CREAT SERPL-MCNC: 0.8 MG/DL (ref 0.5–1.4)
DIFFERENTIAL METHOD: ABNORMAL
EOSINOPHIL NFR BLD: 0 % (ref 0–8)
ERYTHROCYTE [DISTWIDTH] IN BLOOD BY AUTOMATED COUNT: 15.9 % (ref 11.5–14.5)
EST. GFR  (NO RACE VARIABLE): >60 ML/MIN/1.73 M^2
GLUCOSE SERPL-MCNC: 132 MG/DL (ref 70–110)
GLUCOSE UR QL STRIP: NEGATIVE
HCT VFR BLD AUTO: 37.9 % (ref 40–54)
HGB BLD-MCNC: 12.4 G/DL (ref 14–18)
HGB UR QL STRIP: NEGATIVE
IMM GRANULOCYTES # BLD AUTO: ABNORMAL K/UL (ref 0–0.04)
IMM GRANULOCYTES NFR BLD AUTO: ABNORMAL % (ref 0–0.5)
KETONES UR QL STRIP: NEGATIVE
LEUKOCYTE ESTERASE UR QL STRIP: NEGATIVE
LYMPHOCYTES NFR BLD: 17 % (ref 18–48)
MAGNESIUM SERPL-MCNC: 1.4 MG/DL (ref 1.6–2.6)
MCH RBC QN AUTO: 30.5 PG (ref 27–31)
MCHC RBC AUTO-ENTMCNC: 32.7 G/DL (ref 32–36)
MCV RBC AUTO: 93 FL (ref 82–98)
METAMYELOCYTES NFR BLD MANUAL: 1 %
MONOCYTES NFR BLD: 32 % (ref 4–15)
MYELOCYTES NFR BLD MANUAL: 6 %
NEUTROPHILS NFR BLD: 35 % (ref 38–73)
NEUTS BAND NFR BLD MANUAL: 9 %
NITRITE UR QL STRIP: NEGATIVE
NRBC BLD-RTO: 0 /100 WBC
PH UR STRIP: 8 [PH] (ref 5–8)
PHOSPHATE SERPL-MCNC: 2.3 MG/DL (ref 2.7–4.5)
PLATELET # BLD AUTO: 303 K/UL (ref 150–450)
PLATELET BLD QL SMEAR: ABNORMAL
PMV BLD AUTO: 10.2 FL (ref 9.2–12.9)
POTASSIUM SERPL-SCNC: 4.5 MMOL/L (ref 3.5–5.1)
PROT UR QL STRIP: ABNORMAL
RBC # BLD AUTO: 4.06 M/UL (ref 4.6–6.2)
SARS-COV-2 RDRP RESP QL NAA+PROBE: NEGATIVE
SODIUM SERPL-SCNC: 138 MMOL/L (ref 136–145)
SP GR UR STRIP: 1.02 (ref 1–1.03)
URN SPEC COLLECT METH UR: ABNORMAL
UROBILINOGEN UR STRIP-ACNC: NEGATIVE EU/DL
WBC # BLD AUTO: 8.26 K/UL (ref 3.9–12.7)

## 2023-02-25 PROCEDURE — 80048 BASIC METABOLIC PNL TOTAL CA: CPT | Performed by: INTERNAL MEDICINE

## 2023-02-25 PROCEDURE — 81003 URINALYSIS AUTO W/O SCOPE: CPT | Performed by: INTERNAL MEDICINE

## 2023-02-25 PROCEDURE — 94761 N-INVAS EAR/PLS OXIMETRY MLT: CPT

## 2023-02-25 PROCEDURE — 85027 COMPLETE CBC AUTOMATED: CPT | Performed by: INTERNAL MEDICINE

## 2023-02-25 PROCEDURE — S0030 INJECTION, METRONIDAZOLE: HCPCS | Performed by: INTERNAL MEDICINE

## 2023-02-25 PROCEDURE — 25000242 PHARM REV CODE 250 ALT 637 W/ HCPCS: Performed by: INTERNAL MEDICINE

## 2023-02-25 PROCEDURE — 25000003 PHARM REV CODE 250: Performed by: NURSE PRACTITIONER

## 2023-02-25 PROCEDURE — 99900035 HC TECH TIME PER 15 MIN (STAT)

## 2023-02-25 PROCEDURE — 87040 BLOOD CULTURE FOR BACTERIA: CPT | Performed by: INTERNAL MEDICINE

## 2023-02-25 PROCEDURE — 83735 ASSAY OF MAGNESIUM: CPT | Performed by: INTERNAL MEDICINE

## 2023-02-25 PROCEDURE — 25000003 PHARM REV CODE 250: Performed by: INTERNAL MEDICINE

## 2023-02-25 PROCEDURE — 84100 ASSAY OF PHOSPHORUS: CPT | Performed by: INTERNAL MEDICINE

## 2023-02-25 PROCEDURE — 99900031 HC PATIENT EDUCATION (STAT)

## 2023-02-25 PROCEDURE — 12000002 HC ACUTE/MED SURGE SEMI-PRIVATE ROOM

## 2023-02-25 PROCEDURE — U0002 COVID-19 LAB TEST NON-CDC: HCPCS | Performed by: INTERNAL MEDICINE

## 2023-02-25 PROCEDURE — 36415 COLL VENOUS BLD VENIPUNCTURE: CPT | Performed by: INTERNAL MEDICINE

## 2023-02-25 PROCEDURE — 94640 AIRWAY INHALATION TREATMENT: CPT

## 2023-02-25 PROCEDURE — 85007 BL SMEAR W/DIFF WBC COUNT: CPT | Performed by: INTERNAL MEDICINE

## 2023-02-25 PROCEDURE — 63600175 PHARM REV CODE 636 W HCPCS: Performed by: INTERNAL MEDICINE

## 2023-02-25 RX ORDER — MAGNESIUM SULFATE HEPTAHYDRATE 40 MG/ML
2 INJECTION, SOLUTION INTRAVENOUS ONCE
Status: COMPLETED | OUTPATIENT
Start: 2023-02-25 | End: 2023-02-25

## 2023-02-25 RX ADMIN — TRAMADOL HYDROCHLORIDE 50 MG: 50 TABLET, COATED ORAL at 05:02

## 2023-02-25 RX ADMIN — POTASSIUM, SODIUM PHOSPHATES 280 MG-160 MG-250 MG ORAL POWDER PACKET 1 PACKET: POWDER IN PACKET at 08:02

## 2023-02-25 RX ADMIN — FOLIC ACID 1 MG: 1 TABLET ORAL at 08:02

## 2023-02-25 RX ADMIN — TRAMADOL HYDROCHLORIDE 50 MG: 50 TABLET, COATED ORAL at 12:02

## 2023-02-25 RX ADMIN — GABAPENTIN 800 MG: 100 CAPSULE ORAL at 08:02

## 2023-02-25 RX ADMIN — PANTOPRAZOLE SODIUM 40 MG: 40 TABLET, DELAYED RELEASE ORAL at 05:02

## 2023-02-25 RX ADMIN — TRAMADOL HYDROCHLORIDE 50 MG: 50 TABLET, COATED ORAL at 02:02

## 2023-02-25 RX ADMIN — COLLAGENASE SANTYL: 250 OINTMENT TOPICAL at 04:02

## 2023-02-25 RX ADMIN — METRONIDAZOLE 500 MG: 5 INJECTION, SOLUTION INTRAVENOUS at 08:02

## 2023-02-25 RX ADMIN — ENOXAPARIN SODIUM 40 MG: 100 INJECTION SUBCUTANEOUS at 08:02

## 2023-02-25 RX ADMIN — METHOCARBAMOL 500 MG: 100 INJECTION INTRAMUSCULAR; INTRAVENOUS at 10:02

## 2023-02-25 RX ADMIN — VANCOMYCIN HYDROCHLORIDE 125 MG: KIT at 08:02

## 2023-02-25 RX ADMIN — TRAMADOL HYDROCHLORIDE 50 MG: 50 TABLET, COATED ORAL at 08:02

## 2023-02-25 RX ADMIN — PIPERACILLIN SODIUM AND TAZOBACTAM SODIUM 3.38 G: 3; .375 INJECTION, POWDER, LYOPHILIZED, FOR SOLUTION INTRAVENOUS at 08:02

## 2023-02-25 RX ADMIN — VANCOMYCIN HYDROCHLORIDE 125 MG: KIT at 02:02

## 2023-02-25 RX ADMIN — MAGNESIUM SULFATE HEPTAHYDRATE 2 G: 40 INJECTION, SOLUTION INTRAVENOUS at 08:02

## 2023-02-25 RX ADMIN — VANCOMYCIN HYDROCHLORIDE 125 MG: KIT at 05:02

## 2023-02-25 RX ADMIN — QUETIAPINE 100 MG: 100 TABLET ORAL at 10:02

## 2023-02-25 RX ADMIN — GABAPENTIN 800 MG: 100 CAPSULE ORAL at 02:02

## 2023-02-25 RX ADMIN — BUDESONIDE INHALATION 0.5 MG: 0.5 SUSPENSION RESPIRATORY (INHALATION) at 08:02

## 2023-02-25 RX ADMIN — METHOCARBAMOL 500 MG: 100 INJECTION INTRAMUSCULAR; INTRAVENOUS at 03:02

## 2023-02-25 RX ADMIN — ARFORMOTEROL TARTRATE 15 MCG: 15 SOLUTION RESPIRATORY (INHALATION) at 08:02

## 2023-02-25 NOTE — CONSULTS
"[facility]  Neurology  Consult Note    Patient Name: Elder Rosales  MRN: [id number]  Admission Date: 2/19/2023  Hospital Length of Stay: 6 days  Code Status: Full Code   Attending Provider: Nayely Denson MD   Consulting Provider: Yani Ross DNP  Primary Care Physician: [facility]  Principal Problem:Clostridium difficile infection    Inpatient consult to Neurology  Consult performed by: Yani Ross DNP  Consult ordered by: Nayely Denson MD      Subjective:     Chief Complaint:  neuropathic foot pain     HPI: as per EMR: Patient is a 41 yo cauc male with hx ETOH, >20y/pack/d Reformed smoker, vertigo, ADHD, IVDU, Polysubstance abused,treated Hep c . He reports going camping and fishing 3 days ago and experienced vertigo, abdominal pain, N/V, falling and imbalance, hearing voices in his head and talking to himself. He report dark black stools for 4 days however hbg is stable. He states, " he stop use to smoke a pack/d, and drink 12-15 beers a day but stopped a month ago". While in the ED reported suicidal thoughts and depression and asked to speak with a psychiatrist. He was evaluated by telepsych. On assessment patient was fidgeting, difficulty focusing, not staying on track with questions asked and gaurded to left hip and back. He denied drug use however past documentation states iv drug user,and  polysubstance abuse. He denies chest pain, dysuria, fever, and chills.      Hospitalist asked to admit for hyponatermia , pancreatitis, enterocolitis, abd ct abnormality. Nephrology, GI consulted in ED.    NEurology Consult Note: Patient seen and examined with DR. ROXY Cortez. He originally presented with the above symptoms. However neurology consulted for neuropathic pain to b/l feet. Patient describes the pain as severe and debilitating stating it is difficult to walk due to the pain. He states he has tried gabapentin, cymbalta and lyrica and " none has worked..     Past Medical History:   Diagnosis Date    Anxiety     Carpal tunnel syndrome     Hepatitis C virus infection cured after antiviral drug therapy     s/p epclusa, svr12 - 3/2021 (treated / cured)    Shoulder disorder        No past surgical history on file.    Review of patient's allergies indicates:  No Known Allergies    Current Neurological Medications: gabapentin     No current facility-administered medications on file prior to encounter.     Current Outpatient Medications on File Prior to Encounter   Medication Sig    amLODIPine (NORVASC) 5 MG tablet Take 5 mg by mouth once daily.    clonazePAM (KLONOPIN) 1 MG tablet Take 1 mg by mouth daily as needed.    QUEtiapine (SEROQUEL) 100 MG Tab Take 100 mg by mouth every evening.    buprenorphine-naloxone 8-2 (SUBOXONE) 8-2 mg Subl buprenorphine 8 mg-naloxone 2 mg sublingual tablet   DISSOLVE THREE TABLETS UNDER THE TONGUE ONCE DAILY    buprenorphine-naloxone 8-2 mg (SUBOXONE) 8-2 mg Place under the tongue.    buPROPion (WELLBUTRIN XL) 150 MG TB24 tablet Take 150 mg by mouth every morning.    busPIRone (BUSPAR) 10 MG tablet buspirone 10 mg tablet   TAKE ONE TABLET BY MOUTH TWICE DAILY    carvediloL (COREG) 3.125 MG tablet Take 3.125 mg by mouth 2 (two) times daily.    cetirizine (ZYRTEC) 10 MG tablet Take 1 tablet (10 mg total) by mouth once daily.    clonazePAM (KLONOPIN) 2 MG Tab Take 2 mg by mouth 2 (two) times daily.    dexchlorphen-phenylephrine-DM (POLYTUSSIN DM) 1-5-10 mg/5 mL Syrp Take 5 mLs by mouth every 4 (four) hours as needed.    DULoxetine (CYMBALTA) 30 MG capsule duloxetine 30 mg capsule,delayed release   TAKE ONE CAPSULE BY MOUTH ONCE DAILY    fluticasone propionate (FLONASE) 50 mcg/actuation nasal spray 1 spray (50 mcg total) by Each Nostril route once daily.    gabapentin (NEURONTIN) 600 MG tablet Take 1 tablet (600 mg total) by mouth once daily.    gabapentin (NEURONTIN) 800 MG tablet gabapentin 800 mg tablet   TAKE 1 TABLET BY  MOUTH THREE TIMES DAILY    lisinopriL (PRINIVIL,ZESTRIL) 5 MG tablet lisinopril 5 mg tablet   TAKE 1 TABLET BY MOUTH EVERY DAY    lisinopriL 10 MG tablet lisinopril 10 mg tablet   TAKE ONE TABLET BY MOUTH ONCE DAILY    meclizine (ANTIVERT) 25 mg tablet Take 1 tablet (25 mg total) by mouth 3 (three) times daily as needed for Dizziness.    ondansetron (ZOFRAN-ODT) 4 MG TbDL Take 1 tablet (4 mg total) by mouth every 8 (eight) hours as needed (nausea).    QUEtiapine (SEROQUEL) 50 MG tablet quetiapine 50 mg tablet   TAKE 1 TABLET BY MOUTH EVERY DAY AS DIRECTED    traZODone (DESYREL) 100 MG tablet TAKE 1/2 TO 1 TABLET BY MOUTH EVERY EVENING      Family History       Problem Relation (Age of Onset)    No Known Problems Mother, Father          Tobacco Use    Smoking status: Every Day     Packs/day: 0.50     Types: Cigarettes    Smokeless tobacco: Never   Substance and Sexual Activity    Alcohol use: Yes     Comment: bourbon    Drug use: Not Currently     Types: Marijuana, Cocaine, Anabolic steroids    Sexual activity: Yes     Partners: Female     Review of Systems   Constitutional: Negative.    HENT: Negative.     Cardiovascular: Negative.    Musculoskeletal:  Positive for gait problem.   Neurological:  Positive for numbness. Negative for weakness.   Objective:     Vital Signs (Most Recent):  Temp: 99.8 °F (37.7 °C) (02/25/23 0803)  Pulse: 70 (02/25/23 0815)  Resp: 18 (02/25/23 1423)  BP: 121/79 (02/25/23 0803)  SpO2: 97 % (02/25/23 0815) Vital Signs (24h Range):  Temp:  [99.8 °F (37.7 °C)-100.4 °F (38 °C)] 99.8 °F (37.7 °C)  Pulse:  [] 70  Resp:  [16-18] 18  SpO2:  [97 %-99 %] 97 %  BP: (118-149)/(79-95) 121/79     Weight: 84.2 kg (185 lb 10 oz)  Body mass index is 25.18 kg/m².    Physical Exam  Vitals and nursing note reviewed.   HENT:      Head: Normocephalic.   Eyes:      Extraocular Movements: Extraocular movements intact.   Cardiovascular:      Rate and Rhythm: Normal rate.   Pulmonary:      Effort: Pulmonary  effort is normal.   Musculoskeletal:         General: Tenderness present. Normal range of motion.      Cervical back: Normal range of motion.   Neurological:      Mental Status: He is alert.      Deep Tendon Reflexes:      Reflex Scores:       Patellar reflexes are 2+ on the right side and 2+ on the left side.      NEUROLOGICAL EXAMINATION:     REFLEXES     Reflexes   Right patellar: 2+  Left patellar: 2+    SENSORY EXAM   Light touch normal.     Significant Labs: Hemoglobin A1c: No results for input(s): HGBA1C in the last 720 hours.  BMP:   Recent Labs   Lab 02/24/23  0559 02/25/23  0601   * 132*    138   K 4.1 4.5    99   CO2 30* 31*   BUN 9 12   CREATININE 0.6 0.8   CALCIUM 9.3 9.6   MG 1.7 1.4*     CBC:   Recent Labs   Lab 02/25/23  0827   WBC 8.26   HGB 12.4*   HCT 37.9*        CMP:   Recent Labs   Lab 02/24/23  0559 02/25/23  0601   * 132*    138   K 4.1 4.5    99   CO2 30* 31*   BUN 9 12   CREATININE 0.6 0.8   CALCIUM 9.3 9.6   MG 1.7 1.4*   ANIONGAP 6* 8     All pertinent lab results from the past 24 hours have been reviewed.    Significant Imaging: I have reviewed all pertinent imaging results/findings within the past 24 hours.    MRI Lumbar Spine Without Contrast  Order: [id number]  Status: Final result     Visible to patient: No (inaccessible in Patient Portal)     Next appt: None     0 Result Notes  Details    Reading Physician Reading Date Result Priority   Harpal Jeong MD  [phone] 2/25/2023      Narrative & Impression  HISTORY: Bilateral foot pain.     TECHNIQUE: Routine MRI lumbar spine without contrast.     FINDINGS: Comparison to multiple prior exams. The lumbar spine has normal lordotic curvature and vertebral body alignment, with a transitional lumbosacral segment which is designated L5 based upon morphology. There are no acute fractures, destructive osseous lesions, or evidence of a diffuse marrow replacement process. There is degenerative loss  of disc height and signal at multiple levels, most advanced at T11-T12 with heterogeneous degenerative type marrow endplate signal. There is no spondylolysis. The conus medullaris is normal in signal and terminates at T12.     At T12-L1, there is minimal broad-based disc bulging, without significant spinal canal stenosis or neural foraminal narrowing.     At L1-L2, there is no significant abnormality.     At L2-L3, there is minimal broad-based disc bulging, without significant spinal canal stenosis or neural foraminal narrowing.     At L3-L4, there is no significant disc bulging or focal disc herniation, with no spinal canal stenosis or neural foraminal narrowing. There is bilateral facet hypertrophy.     At L4-L5, there is minimal broad-based disc bulging, without spinal canal stenosis or neural foraminal narrowing.     At L5-S1, there is no significant abnormality. There is sacralization of the L5 vertebral body, having osseous fusion with both sacral ala.     There are no signal abnormalities of the paraspinal soft tissues or the paraspinal ligaments. The visualized retroperitoneum is unremarkable.     IMPRESSION:  1. Transitional lumbosacral segment as described.  2. Multilevel lumbar degenerative disc disease, with no focal disc herniation, spinal canal stenosis, or evidence of nerve impingement at any level.          Assessment and Plan:    Neuropathy of BL feet     MRI L spine - chronic changes noted without nerve impingment   Neuropathy labs   Will complete work up and then discuss medication options with patient     Patient to follow up with [facility] at [phone] within 2 weeks from discharge.  Stroke education was provided including stroke risk factors modification and any acute neurological changes including weakness, confusion, visual changes to come straight to the ER.  Seizure educaation was provided including no driving, no swimming by self, no operation of heavy machinery or climbing  on ladders.  All side effects of new medications were discussed with patient and/or next of kin and all questions were answered.       Active Diagnoses:    Diagnosis Date Noted POA    PRINCIPAL PROBLEM:  Clostridium difficile infection [A49.8] 02/23/2023 Yes    Pain in both feet [M79.671, M79.672] 02/25/2023 Yes     Chronic    Fever [R50.9] 02/25/2023 No    Hypomagnesemia [E83.42] 02/25/2023 No    Folate deficiency [E53.8] 02/24/2023 Yes     Chronic    Hypophosphatemia [E83.39] 02/24/2023 No    Depression [F32.A] 02/21/2023 Yes     Chronic    Anemia [D64.9] 02/21/2023 Yes     Chronic    Suicidal ideation [R45.851] 02/19/2023 Not Applicable     Chronic    Hepatitis C virus infection cured after antiviral drug therapy [Z86.19]  Yes    Nicotine abuse [Z72.0] 09/03/2020 Yes      Problems Resolved During this Admission:    Diagnosis Date Noted Date Resolved POA    Camila-Portillo tear, suspected [K22.6] 02/21/2023 02/23/2023 Yes    Thrombocytopenia [D69.6] 02/21/2023 02/22/2023 Yes    Acute pancreatitis [K85.90] 02/19/2023 02/22/2023 Yes    Enterocolitis [K52.9] 02/19/2023 02/19/2023 Yes    Hyponatremia [E87.1] 02/19/2023 02/24/2023 Yes    Low blood magnesium [R79.0] 02/19/2023 02/21/2023 Yes    JAIME (acute kidney injury) [N17.9] 02/19/2023 02/21/2023 Yes    Hypokalemia [E87.6] 02/19/2023 02/23/2023 Yes    Chronic hepatitis C virus infection [B18.2] 10/04/2017 02/21/2023 Yes       VTE Risk Mitigation (From admission, onward)           Ordered     enoxaparin injection 40 mg  Every 24 hours (non-standard times)         02/21/23 1151     IP VTE LOW RISK PATIENT  Once         02/19/23 1822                    Thank you for your consult. I will follow-up with patient. Please contact us if you have any additional questions.    Yani Ross, RAMANA  Neurology  formerly [facility]

## 2023-02-25 NOTE — CARE UPDATE
02/25/23 0815   Patient Assessment/Suction   Level of Consciousness (AVPU) alert   Respiratory Effort Normal;Unlabored   Expansion/Accessory Muscles/Retractions no use of accessory muscles;no retractions;expansion symmetric   All Lung Fields Breath Sounds clear   Rhythm/Pattern, Respiratory unlabored;pattern regular;depth regular;chest wiggle adequate;no shortness of breath reported   Cough Frequency no cough   PRE-TX-O2   Device (Oxygen Therapy) room air   SpO2 97 %   Pulse Oximetry Type Intermittent   $ Pulse Oximetry - Multiple Charge Pulse Oximetry - Multiple   Pulse 70   Resp 18   Aerosol Therapy   $ Aerosol Therapy Charges Aerosol Treatment   Daily Review of Necessity (SVN) completed   Respiratory Treatment Status (SVN) given   Treatment Route (SVN) oxygen;mask   Patient Position (SVN) HOB elevated   Post Treatment Assessment (SVN) increased aeration   Signs of Intolerance (SVN) none   Breath Sounds Post-Respiratory Treatment   Throughout All Fields Post-Treatment All Fields   Throughout All Fields Post-Treatment aeration increased   Post-treatment Heart Rate (beats/min) 70   Post-treatment Resp Rate (breaths/min) 18   Education   $ Education Bronchodilator;15 min   Respiratory Evaluation   $ Care Plan Tech Time 15 min

## 2023-02-25 NOTE — PROGRESS NOTES
"Former[facility] Medicine  Progress Note    Patient Name: Elder Rosales  MRN: [id number]  Patient Class: IP- Inpatient   Admission Date: 2/19/2023  Length of Stay: 6 days  Attending Physician: Nayely Denson MD  Primary Care Provider: [facility]        Subjective:     Principal Problem:Clostridium difficile infection        HPI:  Patient is a 41 yo cauc male with hx ETOH, >20y/pack/d Reformed smoker, vertigo, ADHD, IVDU, Polysubstance abused,treated Hep c . He reports going camping and fishing 3 days ago and experienced vertigo, abdominal pain, N/V, falling and imbalance, hearing voices in his head and talking to himself. He report dark black stools for 4 days however hbg is stable. He states, " he stop use to smoke a pack/d, and drink 12-15 beers a day but stopped a month ago". While in the ED reported suicidal thoughts and depression and asked to speak with a psychiatrist. He was evaluated by telepsych. On assessment patient was fidgeting, difficulty focusing, not staying on track with questions asked and gaurded to left hip and back. He denied drug use however past documentation states iv drug user,and  polysubstance abuse. He denies chest pain, dysuria, fever, and chills.     Hospitalist asked to admit for hyponatermia , pancreatitis, enterocolitis, abd ct abnormality. Nephrology, GI consulted in ED.      Overview/Hospital Course:  Patient with a history of alcoholism, smoker, vertigo, history of IVDU but no recent use, treated hepatitis-C.  States he went out fishing/camping, believes he had an attack of vertigo, states subsequent day he was generally weak, it took him 4 days to come out of the camp.  States he was having dark stool.  He presented to the ED, expressed suicidal ideation on background history of depression, he was seen by tele psych kaylah Adams, recommends Seroquel 100 q.h.s., Remeron 15 mg q.h.s. Admission labs with " hyponatremia 120 with severe hypokalemia, transaminitis with elevation of lipase.  He was admitted to the ICU, GI and Nephrology consultation, IV fluid hydration, continuous PPI therapy, esophagram.  On 02/22, esophagram with no evidence of esophageal perforation or tears, starting liquid diet and advancing as tolerated, once medically cleared will need inpatient psychiatric placement.  On 02/22 C diff is positive and started on oral vancomycin.      Interval History:  Patient is reporting severe persistent bilateral feet pain, to the level of his ankle, states it is affecting ambulation due to pain.  States he is on gabapentin 800 mg 3 times a day.  Reports 2 formed bowel movement yesterday, no bowel movement as of today.  Fever of 100.4.  Labs with WBC 8.26, hemoglobin 12.4, magnesium 1.4, phosphorus 2.3.  Discussed with patient.  Sitter at bedside.  Discussed with nursing.    Review of Systems  Objective:     Vital Signs (Most Recent):  Temp: 99.8 °F (37.7 °C) (02/25/23 0803)  Pulse: 70 (02/25/23 0815)  Resp: 18 (02/25/23 0815)  BP: 121/79 (02/25/23 0803)  SpO2: 97 % (02/25/23 0815) Vital Signs (24h Range):  Temp:  [98.2 °F (36.8 °C)-100.4 °F (38 °C)] 99.8 °F (37.7 °C)  Pulse:  [] 70  Resp:  [16-18] 18  SpO2:  [97 %-99 %] 97 %  BP: (103-149)/(65-95) 121/79     Weight: 84.2 kg (185 lb 10 oz)  Body mass index is 25.18 kg/m².  No intake or output data in the 24 hours ending 02/25/23 1042   Physical Exam  Vitals and nursing note reviewed.   Constitutional:       Comments: Lying in bed   HENT:      Head: Normocephalic and atraumatic.      Mouth/Throat:      Mouth: Mucous membranes are moist.   Cardiovascular:      Rate and Rhythm: Normal rate and regular rhythm.   Pulmonary:      Comments: On RA  Abdominal:      Tenderness: There is no abdominal tenderness.   Musculoskeletal:      Comments: No swelling, no calf tenderness   Neurological:      Mental Status: He is alert and oriented to person, place, and time.       Comments: Constantly shaking feet   Psychiatric:      Comments: Irritable, at times using profanity       Significant Labs: BMP:   Recent Labs   Lab 02/25/23  0601   *      K 4.5   CL 99   CO2 31*   BUN 12   CREATININE 0.8   CALCIUM 9.6   MG 1.4*     CBC:   Recent Labs   Lab 02/25/23  0827   WBC 8.26   HGB 12.4*   HCT 37.9*        CMP:   Recent Labs   Lab 02/24/23  0559 02/25/23  0601    138   K 4.1 4.5    99   CO2 30* 31*   * 132*   BUN 9 12   CREATININE 0.6 0.8   CALCIUM 9.3 9.6   ANIONGAP 6* 8     Cardiac Markers: No results for input(s): CKMB, MYOGLOBIN, BNP, TROPISTAT in the last 48 hours.  Magnesium:   Recent Labs   Lab 02/24/23  0559 02/25/23  0601   MG 1.7 1.4*       Significant Imaging: I have reviewed all pertinent imaging results/findings within the past 24 hours.      Assessment/Plan:      Active Hospital Problems    Diagnosis    *Clostridium difficile infection    Pain in both feet    Fever    Hypomagnesemia    Folate deficiency    Hypophosphatemia    Depression    Anemia    Suicidal ideation    Hepatitis C virus infection cured after antiviral drug therapy     s/p epclusa, svr12 - 3/2021 (treated / cured)      Nicotine abuse     Plan:  Continue care on medical floor  Stool is now formed, discontinue isolation precaution, continue oral vancomycin to complete course for C diff  2 g IV magnesium supplementation, continue oral phosphorus supplementation   Continue folic acid supplementation   Fever, check UA with reflex urine culture, blood culture, monitoring temperature curve  Bilateral feet pain, on high-dose gabapentin, ultrasound lower extremity evaluate DVT, MRI lumbosacral spine   Patient suicidal ideation/depression, seen by Dr. Fountain, tele psych on presentation, recommends pec, Seroquel 100 mg q.h.s., mirtazapine 15 mg q.h.s., subsequently seen by Dr. Cole and [facility] for suicidal ideation on 2/20/23 at 1606 pm  [facility] safety precautions, protocol,  one-to-one sitter  Electrolytes sliding scale repletion  Trending labs  Appreciate all consultant's input   Consult Neurology  Further plan as per hospital course   Once medically stable to transfer to inpatient psychiatric facility for ongoing care     VTE Risk Mitigation (From admission, onward)         Ordered     enoxaparin injection 40 mg  Every 24 hours (non-standard times)         02/21/23 1151     IP VTE LOW RISK PATIENT  Once         02/19/23 1822                Discharge Planning   FLORINA: 2/25/2023     Code Status: Full Code   Is the patient medically ready for discharge?:     Reason for patient still in hospital (select all that apply): Patient new problem  Discharge Plan A: Psychiatric hospital   Discharge Delays: None known at this time              Nayely Denson MD  Department of Hospital Medicine   [facility]

## 2023-02-25 NOTE — SUBJECTIVE & OBJECTIVE
Interval History:  Patient is reporting severe persistent bilateral feet pain, to the level of his ankle, states it is affecting ambulation due to pain.  States he is on gabapentin 800 mg 3 times a day.  Reports 2 formed bowel movement yesterday, no bowel movement as of today.  Fever of 100.4.  Labs with WBC 8.26, hemoglobin 12.4, magnesium 1.4, phosphorus 2.3.  Discussed with patient.  Sitter at bedside.  Discussed with nursing.    Review of Systems  Objective:     Vital Signs (Most Recent):  Temp: 99.8 °F (37.7 °C) (02/25/23 0803)  Pulse: 70 (02/25/23 0815)  Resp: 18 (02/25/23 0815)  BP: 121/79 (02/25/23 0803)  SpO2: 97 % (02/25/23 0815) Vital Signs (24h Range):  Temp:  [98.2 °F (36.8 °C)-100.4 °F (38 °C)] 99.8 °F (37.7 °C)  Pulse:  [] 70  Resp:  [16-18] 18  SpO2:  [97 %-99 %] 97 %  BP: (103-149)/(65-95) 121/79     Weight: 84.2 kg (185 lb 10 oz)  Body mass index is 25.18 kg/m².  No intake or output data in the 24 hours ending 02/25/23 1042   Physical Exam  Vitals and nursing note reviewed.   Constitutional:       Comments: Lying in bed   HENT:      Head: Normocephalic and atraumatic.      Mouth/Throat:      Mouth: Mucous membranes are moist.   Cardiovascular:      Rate and Rhythm: Normal rate and regular rhythm.   Pulmonary:      Comments: On RA  Abdominal:      Tenderness: There is no abdominal tenderness.   Musculoskeletal:      Comments: No swelling, no calf tenderness   Neurological:      Mental Status: He is alert and oriented to person, place, and time.      Comments: Constantly shaking feet   Psychiatric:      Comments: Irritable, at times using profanity       Significant Labs: BMP:   Recent Labs   Lab 02/25/23  0601   *      K 4.5   CL 99   CO2 31*   BUN 12   CREATININE 0.8   CALCIUM 9.6   MG 1.4*     CBC:   Recent Labs   Lab 02/25/23  0827   WBC 8.26   HGB 12.4*   HCT 37.9*        CMP:   Recent Labs   Lab 02/24/23  0559 02/25/23  0601    138   K 4.1 4.5    99   CO2  30* 31*   * 132*   BUN 9 12   CREATININE 0.6 0.8   CALCIUM 9.3 9.6   ANIONGAP 6* 8     Cardiac Markers: No results for input(s): CKMB, MYOGLOBIN, BNP, TROPISTAT in the last 48 hours.  Magnesium:   Recent Labs   Lab 02/24/23  0559 02/25/23  0601   MG 1.7 1.4*       Significant Imaging: I have reviewed all pertinent imaging results/findings within the past 24 hours.

## 2023-02-26 PROBLEM — E83.39 HYPOPHOSPHATEMIA: Status: RESOLVED | Noted: 2023-02-24 | Resolved: 2023-02-26

## 2023-02-26 PROBLEM — E83.42 HYPOMAGNESEMIA: Status: RESOLVED | Noted: 2023-02-25 | Resolved: 2023-02-26

## 2023-02-26 LAB
ANION GAP SERPL CALC-SCNC: 8 MMOL/L (ref 8–16)
BUN SERPL-MCNC: 14 MG/DL (ref 6–20)
CALCIUM SERPL-MCNC: 9.2 MG/DL (ref 8.7–10.5)
CHLORIDE SERPL-SCNC: 101 MMOL/L (ref 95–110)
CO2 SERPL-SCNC: 27 MMOL/L (ref 23–29)
CREAT SERPL-MCNC: 0.8 MG/DL (ref 0.5–1.4)
CRP SERPL-MCNC: 3.08 MG/DL
ERYTHROCYTE [DISTWIDTH] IN BLOOD BY AUTOMATED COUNT: 15.6 % (ref 11.5–14.5)
ERYTHROCYTE [SEDIMENTATION RATE] IN BLOOD BY WESTERGREN METHOD: 37 MM/HR (ref 0–10)
EST. GFR  (NO RACE VARIABLE): >60 ML/MIN/1.73 M^2
GLUCOSE SERPL-MCNC: 167 MG/DL (ref 70–110)
HCT VFR BLD AUTO: 34.7 % (ref 40–54)
HGB BLD-MCNC: 11.6 G/DL (ref 14–18)
MAGNESIUM SERPL-MCNC: 1.7 MG/DL (ref 1.6–2.6)
MCH RBC QN AUTO: 30.9 PG (ref 27–31)
MCHC RBC AUTO-ENTMCNC: 33.4 G/DL (ref 32–36)
MCV RBC AUTO: 93 FL (ref 82–98)
PHOSPHATE SERPL-MCNC: 3.2 MG/DL (ref 2.7–4.5)
PLATELET # BLD AUTO: 371 K/UL (ref 150–450)
PMV BLD AUTO: 9.7 FL (ref 9.2–12.9)
POTASSIUM SERPL-SCNC: 4.3 MMOL/L (ref 3.5–5.1)
PROCALCITONIN SERPL IA-MCNC: 0.28 NG/ML (ref 0–0.5)
RBC # BLD AUTO: 3.75 M/UL (ref 4.6–6.2)
SODIUM SERPL-SCNC: 136 MMOL/L (ref 136–145)
WBC # BLD AUTO: 9.79 K/UL (ref 3.9–12.7)

## 2023-02-26 PROCEDURE — 25000242 PHARM REV CODE 250 ALT 637 W/ HCPCS: Performed by: INTERNAL MEDICINE

## 2023-02-26 PROCEDURE — 85651 RBC SED RATE NONAUTOMATED: CPT | Performed by: NURSE PRACTITIONER

## 2023-02-26 PROCEDURE — 80048 BASIC METABOLIC PNL TOTAL CA: CPT | Performed by: INTERNAL MEDICINE

## 2023-02-26 PROCEDURE — 25000003 PHARM REV CODE 250: Performed by: INTERNAL MEDICINE

## 2023-02-26 PROCEDURE — 36415 COLL VENOUS BLD VENIPUNCTURE: CPT | Performed by: NURSE PRACTITIONER

## 2023-02-26 PROCEDURE — 86160 COMPLEMENT ANTIGEN: CPT | Mod: 59 | Performed by: NURSE PRACTITIONER

## 2023-02-26 PROCEDURE — 12000002 HC ACUTE/MED SURGE SEMI-PRIVATE ROOM

## 2023-02-26 PROCEDURE — 94640 AIRWAY INHALATION TREATMENT: CPT

## 2023-02-26 PROCEDURE — 99900031 HC PATIENT EDUCATION (STAT)

## 2023-02-26 PROCEDURE — 36415 COLL VENOUS BLD VENIPUNCTURE: CPT | Performed by: INTERNAL MEDICINE

## 2023-02-26 PROCEDURE — 94799 UNLISTED PULMONARY SVC/PX: CPT

## 2023-02-26 PROCEDURE — 63600175 PHARM REV CODE 636 W HCPCS: Performed by: INTERNAL MEDICINE

## 2023-02-26 PROCEDURE — 94761 N-INVAS EAR/PLS OXIMETRY MLT: CPT

## 2023-02-26 PROCEDURE — 84100 ASSAY OF PHOSPHORUS: CPT | Performed by: INTERNAL MEDICINE

## 2023-02-26 PROCEDURE — 99900035 HC TECH TIME PER 15 MIN (STAT)

## 2023-02-26 PROCEDURE — 25000003 PHARM REV CODE 250: Performed by: NURSE PRACTITIONER

## 2023-02-26 PROCEDURE — 86038 ANTINUCLEAR ANTIBODIES: CPT | Performed by: NURSE PRACTITIONER

## 2023-02-26 PROCEDURE — 84207 ASSAY OF VITAMIN B-6: CPT | Performed by: NURSE PRACTITIONER

## 2023-02-26 PROCEDURE — 83921 ORGANIC ACID SINGLE QUANT: CPT | Performed by: NURSE PRACTITIONER

## 2023-02-26 PROCEDURE — 85027 COMPLETE CBC AUTOMATED: CPT | Performed by: INTERNAL MEDICINE

## 2023-02-26 PROCEDURE — 86140 C-REACTIVE PROTEIN: CPT | Performed by: NURSE PRACTITIONER

## 2023-02-26 PROCEDURE — 84145 PROCALCITONIN (PCT): CPT | Performed by: INTERNAL MEDICINE

## 2023-02-26 PROCEDURE — 84425 ASSAY OF VITAMIN B-1: CPT | Performed by: NURSE PRACTITIONER

## 2023-02-26 PROCEDURE — 83735 ASSAY OF MAGNESIUM: CPT | Performed by: INTERNAL MEDICINE

## 2023-02-26 RX ORDER — CLONAZEPAM 1 MG/1
1 TABLET ORAL DAILY PRN
Status: DISCONTINUED | OUTPATIENT
Start: 2023-02-26 | End: 2023-03-03

## 2023-02-26 RX ADMIN — POTASSIUM, SODIUM PHOSPHATES 280 MG-160 MG-250 MG ORAL POWDER PACKET 1 PACKET: POWDER IN PACKET at 12:02

## 2023-02-26 RX ADMIN — TRAMADOL HYDROCHLORIDE 50 MG: 50 TABLET, COATED ORAL at 11:02

## 2023-02-26 RX ADMIN — GABAPENTIN 800 MG: 100 CAPSULE ORAL at 04:02

## 2023-02-26 RX ADMIN — VANCOMYCIN HYDROCHLORIDE 125 MG: KIT at 09:02

## 2023-02-26 RX ADMIN — PANTOPRAZOLE SODIUM 40 MG: 40 TABLET, DELAYED RELEASE ORAL at 05:02

## 2023-02-26 RX ADMIN — TRAMADOL HYDROCHLORIDE 50 MG: 50 TABLET, COATED ORAL at 04:02

## 2023-02-26 RX ADMIN — TRAMADOL HYDROCHLORIDE 50 MG: 50 TABLET, COATED ORAL at 02:02

## 2023-02-26 RX ADMIN — QUETIAPINE 100 MG: 100 TABLET ORAL at 09:02

## 2023-02-26 RX ADMIN — VANCOMYCIN HYDROCHLORIDE 125 MG: KIT at 02:02

## 2023-02-26 RX ADMIN — PIPERACILLIN SODIUM AND TAZOBACTAM SODIUM 3.38 G: 3; .375 INJECTION, POWDER, LYOPHILIZED, FOR SOLUTION INTRAVENOUS at 02:02

## 2023-02-26 RX ADMIN — POTASSIUM, SODIUM PHOSPHATES 280 MG-160 MG-250 MG ORAL POWDER PACKET 1 PACKET: POWDER IN PACKET at 09:02

## 2023-02-26 RX ADMIN — VANCOMYCIN HYDROCHLORIDE 125 MG: KIT at 04:02

## 2023-02-26 RX ADMIN — ARFORMOTEROL TARTRATE 15 MCG: 15 SOLUTION RESPIRATORY (INHALATION) at 08:02

## 2023-02-26 RX ADMIN — TRAMADOL HYDROCHLORIDE 50 MG: 50 TABLET, COATED ORAL at 09:02

## 2023-02-26 RX ADMIN — BUDESONIDE INHALATION 0.5 MG: 0.5 SUSPENSION RESPIRATORY (INHALATION) at 08:02

## 2023-02-26 RX ADMIN — FOLIC ACID 1 MG: 1 TABLET ORAL at 09:02

## 2023-02-26 RX ADMIN — GABAPENTIN 800 MG: 100 CAPSULE ORAL at 09:02

## 2023-02-26 RX ADMIN — PIPERACILLIN SODIUM AND TAZOBACTAM SODIUM 3.38 G: 3; .375 INJECTION, POWDER, LYOPHILIZED, FOR SOLUTION INTRAVENOUS at 07:02

## 2023-02-26 RX ADMIN — CLONAZEPAM 1 MG: 1 TABLET ORAL at 09:02

## 2023-02-26 RX ADMIN — VANCOMYCIN HYDROCHLORIDE 125 MG: KIT at 07:02

## 2023-02-26 RX ADMIN — CLONAZEPAM 1 MG: 1 TABLET ORAL at 04:02

## 2023-02-26 RX ADMIN — COLLAGENASE SANTYL: 250 OINTMENT TOPICAL at 09:02

## 2023-02-26 RX ADMIN — PIPERACILLIN SODIUM AND TAZOBACTAM SODIUM 3.38 G: 3; .375 INJECTION, POWDER, LYOPHILIZED, FOR SOLUTION INTRAVENOUS at 12:02

## 2023-02-26 RX ADMIN — ENOXAPARIN SODIUM 40 MG: 100 INJECTION SUBCUTANEOUS at 09:02

## 2023-02-26 NOTE — NURSING
Dr. Denson came to address the pt's pains in his feet. Discuseed that the IV pain med isn't used for neurological pains.  The pt did request another provider to take over his case. The DrGurdeep Is now Dr. Correa.    The pt is able to ambulate to the restroom. He is sleeping and not showing any signs of discomfort besides occasional signs and heavy breathing.

## 2023-02-26 NOTE — PLAN OF CARE
Problem: Violence Risk or Actual  Goal: Anger and Impulse Control  Outcome: Ongoing, Progressing     Problem: Adult Inpatient Plan of Care  Goal: Plan of Care Review  Outcome: Ongoing, Progressing  Goal: Patient-Specific Goal (Individualized)  Outcome: Ongoing, Progressing  Goal: Absence of Hospital-Acquired Illness or Injury  Outcome: Ongoing, Progressing  Goal: Optimal Comfort and Wellbeing  Outcome: Ongoing, Progressing  Goal: Readiness for Transition of Care  Outcome: Ongoing, Progressing     Problem: Fluid and Electrolyte Imbalance (Acute Kidney Injury/Impairment)  Goal: Fluid and Electrolyte Balance  Outcome: Ongoing, Progressing

## 2023-02-26 NOTE — PROGRESS NOTES
I have assumed care and evaluated Mr. Rosales. He was asleep at the beginning of my evaluation and I had to wake him. He tells me he takes gabapentin for his carpal tunnel syndrome.  He describes pain to bilateral feet that is now extending up to his ankles for the last few days and worse over the last 48 hours.  His feet feel cold to him and pain to touch and bear weight. He feels frustrated that we have been paying attention to his stool without addressing his feet. I discussed testing done thus far:    MRI lumbar spine with acute finding  Venous doppler US with no DVT  Normal B12 level  Folic acid low- replacement started    Neurology consulted and is following along to assist in suspected neuropathy of his feet.      Exam with no swelling or erythema to the feet. No joint effusions.  Good pulses and feet are normal color and warm with good cap refill.  He was able to dorsi flex and extend without issue though he does report associated pain.  Good/strong dorsalis pedis pulses bilaterally. RN documents he ambulates to the bathroom.  He tells me this is extremely painful for him.    He discussed with me that he would like back the IV Morphine while we work up his pain.  I discussed that I unfortunately did not find that was indicated given current clinical findings and offered to change his Neurontin to another neuropathic agent. He preferred to stay with his Gabapentin.    He also discussed with me restarting his home Klonopin.  He reports he has Rx for it as an outpatient, just does not have a present refill on it.  Per my review of , was last filled in December and thus I have prescribed Klonopin 1mg daily prn.     For workup, I am checking repeat CPK level though low suspicion for rhabdo as etiology. I do not think there is an indication for arterial US based on exam/low suspicion.  I will follow up with Neurology regarding if any further testing.     Other issues we are treating, please  refer to Dr. Denson'  note for today for those details.

## 2023-02-26 NOTE — PROGRESS NOTES
"[facility] Medicine  Progress Note    Patient Name: Elder Rosales  MRN: [id number]  Patient Class: IP- Inpatient   Admission Date: 2/19/2023  Length of Stay: 7 days  Attending Physician: Nayely Denson MD  Primary Care Provider: [facility]        Subjective:     Principal Problem:Fever        HPI:  Patient is a 41 yo cauc male with hx ETOH, >20y/pack/d Reformed smoker, vertigo, ADHD, IVDU, Polysubstance abused,treated Hep c . He reports going camping and fishing 3 days ago and experienced vertigo, abdominal pain, N/V, falling and imbalance, hearing voices in his head and talking to himself. He report dark black stools for 4 days however hbg is stable. He states, " he stop use to smoke a pack/d, and drink 12-15 beers a day but stopped a month ago". While in the ED reported suicidal thoughts and depression and asked to speak with a psychiatrist. He was evaluated by telepsych. On assessment patient was fidgeting, difficulty focusing, not staying on track with questions asked and gaurded to left hip and back. He denied drug use however past documentation states iv drug user,and  polysubstance abuse. He denies chest pain, dysuria, fever, and chills.     Hospitalist asked to admit for hyponatermia , pancreatitis, enterocolitis, abd ct abnormality. Nephrology, GI consulted in ED.      Overview/Hospital Course:  Patient with a history of alcoholism, smoker, vertigo, history of IVDU/polysubstance abuse but denies recent, treated hepatitis-C.  States he went out fishing/camping, believes he had an attack of vertigo, states subsequent day he was generally weak, it took him 4 days to come out of the camp.  States he was having dark stool.  He presented to the ED, expressed suicidal ideation on background history of depression, he was seen by tele psych LEIF Adams, recommends Seroquel 100 q.h.s., Remeron 15 mg q.h.s. Admission labs with hyponatremia " 120 with severe hypokalemia, transaminitis with elevation of lipase.  He was admitted to the ICU, GI and Nephrology consultation, IV fluid hydration, continuous PPI therapy, esophagram.  On 02/22, esophagram with no evidence of esophageal perforation or tears, starting liquid diet and advancing as tolerated, once medically cleared will need inpatient psychiatric placement.  On 02/22 C diff is positive and started on oral vancomycin. He reports bilateral severe burning feet pain, he is on gabapentin 800 mg t.i.d. at home, states pain is worsening, ultrasound legs no DVT, MRI lumbosacral spine no evidence of nerve compromise, neurology consulted, discussed gabapentin, Lyrica, Cymbalta, patient is requesting IV narcotic medications despite counseling.  On 02/25 did have intermittent fevers up to 100.4, fever workup repeated including UA (negative), chest x-ray with possible lower lung changes and empirically started on Zosyn, blood culture no growth to date, rapid COVID negative, procalcitonin 0.78.  On 2/26 continues with irritability and requesting IV narcotics despite counseling on feet pain, seen and examined with RN and sitter in room, using profanity, transfer care to another hospitalist as discussed with patient as feels pain not being appropriately managed.      Interval History:  Patient continues to report severe persistent bilateral feet pain, he is persistently requesting IV narcotics, despite counseling and workup, he is irritable and using profanities.  As per nursing during her encounters on entering room he is usually comfortable/sleeping, he has been ambulating to the bathroom.  He denies any upper respiratory congestion, productive cough, states he had only 1 semi-formed bowel movement today.  Tolerating oral diet.  He has been urinating well.  Did have intermittent fever with T-max 100.4°.  Labs with WBC 9.76, hemoglobin 11.6, procalcitonin 0.78, UA negative, repeat blood culture no growth to date,  chest x-ray reviewed.  Ultrasound lower extremity no DVT.  MRI lumbosacral spine reviewed.  Again reviewed patient with nurse and sitter present at bedside evaluation for his feet pain and Neurology recommendations, he is demanding narcotics including IV as he states he was receiving this earlier in admission, I have reviewed gabapentin or alternatives. I offered another hospitalist provider if he believes he is being mismanaged, he accepted and will be assigned a new provider. I will provide sign out to new provider. Discussed with nursing.    Review of Systems   Constitutional:  Positive for fever.   Respiratory:  Negative for cough.    Gastrointestinal:  Negative for nausea and vomiting.   Genitourinary:  Negative for difficulty urinating.   Psychiatric/Behavioral:  Positive for dysphoric mood.    Objective:     Vital Signs (Most Recent):  Temp: 99.4 °F (37.4 °C) (02/26/23 1120)  Pulse: 69 (02/26/23 1120)  Resp: 16 (02/26/23 0951)  BP: 112/69 (02/26/23 1120)  SpO2: 98 % (02/26/23 1120) Vital Signs (24h Range):  Temp:  [98.6 °F (37 °C)-100.4 °F (38 °C)] 99.4 °F (37.4 °C)  Pulse:  [69-93] 69  Resp:  [16-18] 16  SpO2:  [98 %] 98 %  BP: (100-121)/(69-84) 112/69     Weight: 83.7 kg (184 lb 8.4 oz)  Body mass index is 25.03 kg/m².    Intake/Output Summary (Last 24 hours) at 2/26/2023 1233  Last data filed at 2/26/2023 0957  Gross per 24 hour   Intake 700 ml   Output 800 ml   Net -100 ml      Physical Exam  Vitals and nursing note reviewed.   Constitutional:       Comments: Lying in bed, initially sleeping but did awaken   HENT:      Head: Normocephalic and atraumatic.      Mouth/Throat:      Mouth: Mucous membranes are moist.      Comments: No oral thrush  Cardiovascular:      Rate and Rhythm: Normal rate and regular rhythm.   Pulmonary:      Comments: On room air, no accessory muscle use, no wheeze or crackles  Abdominal:      General: Bowel sounds are normal.      Palpations: Abdomen is soft.      Tenderness: There  is no abdominal tenderness.   Neurological:      Mental Status: He is oriented to person, place, and time.      Comments: On touching feet reporting pain   Psychiatric:      Comments: Irritable, using profanities       Significant Labs: BMP:   Recent Labs   Lab 02/26/23  0653   *      K 4.3      CO2 27   BUN 14   CREATININE 0.8   CALCIUM 9.2   MG 1.7     CBC:   Recent Labs   Lab 02/25/23  0827 02/26/23  0653   WBC 8.26 9.79   HGB 12.4* 11.6*   HCT 37.9* 34.7*    371     CMP:   Recent Labs   Lab 02/25/23  0601 02/26/23  0653    136   K 4.5 4.3   CL 99 101   CO2 31* 27   * 167*   BUN 12 14   CREATININE 0.8 0.8   CALCIUM 9.6 9.2   ANIONGAP 8 8     Cardiac Markers: No results for input(s): CKMB, MYOGLOBIN, BNP, TROPISTAT in the last 48 hours.  Magnesium:   Recent Labs   Lab 02/25/23  0601 02/26/23  0653   MG 1.4* 1.7       Significant Imaging: I have reviewed all pertinent imaging results/findings within the past 24 hours.    MRI lumbosacral spine  FINDINGS: Comparison to multiple prior exams. The lumbar spine has normal lordotic curvature and vertebral body alignment, with a transitional lumbosacral segment which is designated L5 based upon morphology. There are no acute fractures, destructive osseous lesions, or evidence of a diffuse marrow replacement process. There is degenerative loss of disc height and signal at multiple levels, most advanced at T11-T12 with heterogeneous degenerative type marrow endplate signal. There is no spondylolysis. The conus medullaris is normal in signal and terminates at T12.     At T12-L1, there is minimal broad-based disc bulging, without significant spinal canal stenosis or neural foraminal narrowing.     At L1-L2, there is no significant abnormality.     At L2-L3, there is minimal broad-based disc bulging, without significant spinal canal stenosis or neural foraminal narrowing.     At L3-L4, there is no significant disc bulging or focal disc  herniation, with no spinal canal stenosis or neural foraminal narrowing. There is bilateral facet hypertrophy.     At L4-L5, there is minimal broad-based disc bulging, without spinal canal stenosis or neural foraminal narrowing.     At L5-S1, there is no significant abnormality. There is sacralization of the L5 vertebral body, having osseous fusion with both sacral ala.     There are no signal abnormalities of the paraspinal soft tissues or the paraspinal ligaments. The visualized retroperitoneum is unremarkable.     IMPRESSION:  1. Transitional lumbosacral segment as described.  2. Multilevel lumbar degenerative disc disease, with no focal disc herniation, spinal canal stenosis, or evidence of nerve impingement at any level.         Assessment/Plan:      Active Hospital Problems    Diagnosis    *Fever    Clostridium difficile infection    Pain in both feet    Folate deficiency    Depression    Anemia    Suicidal ideation    Hepatitis C virus infection cured after antiviral drug therapy     s/p epclusa, svr12 - 3/2021 (treated / cured)      Nicotine abuse    Polysubstance dependence including opioid type drug, episodic abuse, with delirium     Plan:  Continue care on medical floor  Stool is now formed, discontinued C diff isolation precaution, continue oral vancomycin to complete course for C diff  Ongoing bilateral feet pain, suspect neuropathic, on gabapentin 800 mg t.i.d., ultrasound no DVT, MRI lumbosacral spine negative, neuropathy labs pending   Intermittent fever, repeated septic evaluation including UA, covid 19, chest x-ray, blood culture, procalcitonin 0.78, started empiric Zosyn but monitor and deescalate as able   Patient suicidal ideation/depression, seen by Dr. Fountain, tele psych on presentation, recommends pec, Seroquel 100 mg q.h.s., mirtazapine 15 mg q.h.s., subsequently seen by Dr. Cole and [facility] for suicidal ideation on 2/20/23 at 1606 pm  [facility] safety precautions, protocol, one-to-one  sitter  Electrolytes sliding scale repletion  Trending labs  Appreciate all consultant's input   Appreciate neurology input, await further recommendation  Further plan as per hospital course   Patient will be transferred on the care of another hospitalist, Dr Correa  Once medically stable to transfer to inpatient psychiatric facility for ongoing care    VTE Risk Mitigation (From admission, onward)         Ordered     enoxaparin injection 40 mg  Every 24 hours (non-standard times)         02/21/23 1151     IP VTE LOW RISK PATIENT  Once         02/19/23 1822                Discharge Planning   FLORINA: 2/25/2023     Code Status: Full Code   Is the patient medically ready for discharge?:     Reason for patient still in hospital (select all that apply): Patient trending condition  Discharge Plan A: Psychiatric hospital   Discharge Delays: None known at this time              Nayely Denson MD  Department of Hospital Medicine   [facility]

## 2023-02-26 NOTE — SUBJECTIVE & OBJECTIVE
Interval History:  Patient continues to report severe persistent bilateral feet pain, he is persistently requesting IV narcotics, despite counseling and workup, he is irritable and using profanities.  As per nursing during her encounters on entering room he is usually comfortable/sleeping, he has been ambulating to the bathroom.  He denies any upper respiratory congestion, productive cough, states he had only 1 semi-formed bowel movement today.  Tolerating oral diet.  He has been urinating well.  Did have intermittent fever with T-max 100.4°.  Labs with WBC 9.76, hemoglobin 11.6, procalcitonin 0.78, UA negative, repeat blood culture no growth to date, chest x-ray reviewed.  Ultrasound lower extremity no DVT.  MRI lumbosacral spine reviewed.  Again reviewed patient with nurse and sitter present at bedside evaluation for his feet pain and Neurology recommendations, he is demanding narcotics including IV as he states he was receiving this earlier in admission, I have reviewed gabapentin or alternatives. I offered another hospitalist provider if he believes he is being mismanaged, he accepted and will be assigned a new provider. I will provide sign out to new provider. Discussed with nursing.    Review of Systems   Constitutional:  Positive for fever.   Respiratory:  Negative for cough.    Gastrointestinal:  Negative for nausea and vomiting.   Genitourinary:  Negative for difficulty urinating.   Psychiatric/Behavioral:  Positive for dysphoric mood.    Objective:     Vital Signs (Most Recent):  Temp: 99.4 °F (37.4 °C) (02/26/23 1120)  Pulse: 69 (02/26/23 1120)  Resp: 16 (02/26/23 0951)  BP: 112/69 (02/26/23 1120)  SpO2: 98 % (02/26/23 1120) Vital Signs (24h Range):  Temp:  [98.6 °F (37 °C)-100.4 °F (38 °C)] 99.4 °F (37.4 °C)  Pulse:  [69-93] 69  Resp:  [16-18] 16  SpO2:  [98 %] 98 %  BP: (100-121)/(69-84) 112/69     Weight: 83.7 kg (184 lb 8.4 oz)  Body mass index is 25.03 kg/m².    Intake/Output Summary (Last 24 hours)  at 2/26/2023 1233  Last data filed at 2/26/2023 0957  Gross per 24 hour   Intake 700 ml   Output 800 ml   Net -100 ml      Physical Exam  Vitals and nursing note reviewed.   Constitutional:       Comments: Lying in bed, initially sleeping but did awaken   HENT:      Head: Normocephalic and atraumatic.      Mouth/Throat:      Mouth: Mucous membranes are moist.      Comments: No oral thrush  Cardiovascular:      Rate and Rhythm: Normal rate and regular rhythm.   Pulmonary:      Comments: On room air, no accessory muscle use, no wheeze or crackles  Abdominal:      General: Bowel sounds are normal.      Palpations: Abdomen is soft.      Tenderness: There is no abdominal tenderness.   Neurological:      Mental Status: He is oriented to person, place, and time.      Comments: On touching feet reporting pain   Psychiatric:      Comments: Irritable, using profanities       Significant Labs: BMP:   Recent Labs   Lab 02/26/23  0653   *      K 4.3      CO2 27   BUN 14   CREATININE 0.8   CALCIUM 9.2   MG 1.7     CBC:   Recent Labs   Lab 02/25/23  0827 02/26/23  0653   WBC 8.26 9.79   HGB 12.4* 11.6*   HCT 37.9* 34.7*    371     CMP:   Recent Labs   Lab 02/25/23  0601 02/26/23  0653    136   K 4.5 4.3   CL 99 101   CO2 31* 27   * 167*   BUN 12 14   CREATININE 0.8 0.8   CALCIUM 9.6 9.2   ANIONGAP 8 8     Cardiac Markers: No results for input(s): CKMB, MYOGLOBIN, BNP, TROPISTAT in the last 48 hours.  Magnesium:   Recent Labs   Lab 02/25/23  0601 02/26/23  0653   MG 1.4* 1.7       Significant Imaging: I have reviewed all pertinent imaging results/findings within the past 24 hours.    MRI lumbosacral spine  FINDINGS: Comparison to multiple prior exams. The lumbar spine has normal lordotic curvature and vertebral body alignment, with a transitional lumbosacral segment which is designated L5 based upon morphology. There are no acute fractures, destructive osseous lesions, or evidence of a diffuse  marrow replacement process. There is degenerative loss of disc height and signal at multiple levels, most advanced at T11-T12 with heterogeneous degenerative type marrow endplate signal. There is no spondylolysis. The conus medullaris is normal in signal and terminates at T12.     At T12-L1, there is minimal broad-based disc bulging, without significant spinal canal stenosis or neural foraminal narrowing.     At L1-L2, there is no significant abnormality.     At L2-L3, there is minimal broad-based disc bulging, without significant spinal canal stenosis or neural foraminal narrowing.     At L3-L4, there is no significant disc bulging or focal disc herniation, with no spinal canal stenosis or neural foraminal narrowing. There is bilateral facet hypertrophy.     At L4-L5, there is minimal broad-based disc bulging, without spinal canal stenosis or neural foraminal narrowing.     At L5-S1, there is no significant abnormality. There is sacralization of the L5 vertebral body, having osseous fusion with both sacral ala.     There are no signal abnormalities of the paraspinal soft tissues or the paraspinal ligaments. The visualized retroperitoneum is unremarkable.     IMPRESSION:  1. Transitional lumbosacral segment as described.  2. Multilevel lumbar degenerative disc disease, with no focal disc herniation, spinal canal stenosis, or evidence of nerve impingement at any level.

## 2023-02-26 NOTE — PLAN OF CARE
Problem: Violence Risk or Actual  Goal: Anger and Impulse Control  Outcome: Ongoing, Progressing     Problem: Adult Inpatient Plan of Care  Goal: Plan of Care Review  Outcome: Ongoing, Progressing  Goal: Patient-Specific Goal (Individualized)  Outcome: Ongoing, Progressing  Goal: Absence of Hospital-Acquired Illness or Injury  Outcome: Ongoing, Progressing  Goal: Optimal Comfort and Wellbeing  Outcome: Ongoing, Progressing  Goal: Readiness for Transition of Care  Outcome: Ongoing, Progressing     Problem: Fluid and Electrolyte Imbalance (Acute Kidney Injury/Impairment)  Goal: Fluid and Electrolyte Balance  Outcome: Ongoing, Progressing     Problem: Oral Intake Inadequate (Acute Kidney Injury/Impairment)  Goal: Optimal Nutrition Intake  Outcome: Ongoing, Progressing     Problem: Renal Function Impairment (Acute Kidney Injury/Impairment)  Goal: Effective Renal Function  Outcome: Ongoing, Progressing     Problem: Suicide Risk  Goal: Absence of Self-Harm  Outcome: Ongoing, Progressing     Problem: Pain Acute  Goal: Acceptable Pain Control and Functional Ability  Outcome: Ongoing, Progressing     Problem: Skin Injury Risk Increased  Goal: Skin Health and Integrity  Outcome: Ongoing, Progressing     Problem: Impaired Wound Healing  Goal: Optimal Wound Healing  Outcome: Ongoing, Progressing     Problem: Infection  Goal: Absence of Infection Signs and Symptoms  Outcome: Ongoing, Progressing

## 2023-02-27 PROBLEM — R50.9 FEVER: Status: RESOLVED | Noted: 2023-02-25 | Resolved: 2023-02-27

## 2023-02-27 LAB
ALBUMIN SERPL BCP-MCNC: 3 G/DL (ref 3.5–5.2)
ALP SERPL-CCNC: 68 U/L (ref 55–135)
ALT SERPL W/O P-5'-P-CCNC: 32 U/L (ref 10–44)
ANION GAP SERPL CALC-SCNC: 9 MMOL/L (ref 8–16)
AST SERPL-CCNC: 30 U/L (ref 10–40)
BILIRUB SERPL-MCNC: 0.2 MG/DL (ref 0.1–1)
BUN SERPL-MCNC: 11 MG/DL (ref 6–20)
CALCIUM SERPL-MCNC: 9.4 MG/DL (ref 8.7–10.5)
CHLORIDE SERPL-SCNC: 103 MMOL/L (ref 95–110)
CK SERPL-CCNC: 15 U/L (ref 20–200)
CO2 SERPL-SCNC: 28 MMOL/L (ref 23–29)
CREAT SERPL-MCNC: 0.7 MG/DL (ref 0.5–1.4)
ERYTHROCYTE [DISTWIDTH] IN BLOOD BY AUTOMATED COUNT: 15.7 % (ref 11.5–14.5)
EST. GFR  (NO RACE VARIABLE): >60 ML/MIN/1.73 M^2
GLUCOSE SERPL-MCNC: 127 MG/DL (ref 70–110)
HCT VFR BLD AUTO: 35.2 % (ref 40–54)
HGB BLD-MCNC: 11.5 G/DL (ref 14–18)
MAGNESIUM SERPL-MCNC: 1.6 MG/DL (ref 1.6–2.6)
MCH RBC QN AUTO: 30.3 PG (ref 27–31)
MCHC RBC AUTO-ENTMCNC: 32.7 G/DL (ref 32–36)
MCV RBC AUTO: 93 FL (ref 82–98)
PLATELET # BLD AUTO: 398 K/UL (ref 150–450)
PMV BLD AUTO: 9.6 FL (ref 9.2–12.9)
POTASSIUM SERPL-SCNC: 4 MMOL/L (ref 3.5–5.1)
PROT SERPL-MCNC: 6.5 G/DL (ref 6–8.4)
RBC # BLD AUTO: 3.8 M/UL (ref 4.6–6.2)
SODIUM SERPL-SCNC: 140 MMOL/L (ref 136–145)
WBC # BLD AUTO: 10.54 K/UL (ref 3.9–12.7)

## 2023-02-27 PROCEDURE — 94640 AIRWAY INHALATION TREATMENT: CPT

## 2023-02-27 PROCEDURE — 25000242 PHARM REV CODE 250 ALT 637 W/ HCPCS: Performed by: INTERNAL MEDICINE

## 2023-02-27 PROCEDURE — 25000003 PHARM REV CODE 250: Performed by: NURSE PRACTITIONER

## 2023-02-27 PROCEDURE — 83735 ASSAY OF MAGNESIUM: CPT | Performed by: INTERNAL MEDICINE

## 2023-02-27 PROCEDURE — 12000002 HC ACUTE/MED SURGE SEMI-PRIVATE ROOM

## 2023-02-27 PROCEDURE — 25000003 PHARM REV CODE 250: Performed by: INTERNAL MEDICINE

## 2023-02-27 PROCEDURE — 63600175 PHARM REV CODE 636 W HCPCS: Performed by: INTERNAL MEDICINE

## 2023-02-27 PROCEDURE — 99900035 HC TECH TIME PER 15 MIN (STAT)

## 2023-02-27 PROCEDURE — 85027 COMPLETE CBC AUTOMATED: CPT | Performed by: INTERNAL MEDICINE

## 2023-02-27 PROCEDURE — 80053 COMPREHEN METABOLIC PANEL: CPT | Performed by: INTERNAL MEDICINE

## 2023-02-27 PROCEDURE — 82550 ASSAY OF CK (CPK): CPT | Performed by: INTERNAL MEDICINE

## 2023-02-27 PROCEDURE — 94761 N-INVAS EAR/PLS OXIMETRY MLT: CPT

## 2023-02-27 PROCEDURE — 36415 COLL VENOUS BLD VENIPUNCTURE: CPT | Performed by: INTERNAL MEDICINE

## 2023-02-27 PROCEDURE — 94799 UNLISTED PULMONARY SVC/PX: CPT

## 2023-02-27 PROCEDURE — 99900031 HC PATIENT EDUCATION (STAT)

## 2023-02-27 RX ORDER — PANTOPRAZOLE SODIUM 40 MG/1
40 TABLET, DELAYED RELEASE ORAL DAILY
Qty: 30 TABLET | Refills: 11
Start: 2023-02-28 | End: 2024-02-28

## 2023-02-27 RX ORDER — LEVOFLOXACIN 500 MG/1
500 TABLET, FILM COATED ORAL
Qty: 3 TABLET | Refills: 0
Start: 2023-02-28 | End: 2023-03-03

## 2023-02-27 RX ORDER — TRAMADOL HYDROCHLORIDE 50 MG/1
50 TABLET ORAL EVERY 6 HOURS PRN
Start: 2023-02-27

## 2023-02-27 RX ORDER — FOLIC ACID 1 MG/1
1 TABLET ORAL DAILY
Refills: 0
Start: 2023-02-28 | End: 2024-02-28

## 2023-02-27 RX ORDER — LEVOFLOXACIN 250 MG/1
500 TABLET ORAL
Status: DISCONTINUED | OUTPATIENT
Start: 2023-02-27 | End: 2023-03-04 | Stop reason: HOSPADM

## 2023-02-27 RX ADMIN — GABAPENTIN 800 MG: 100 CAPSULE ORAL at 03:02

## 2023-02-27 RX ADMIN — VANCOMYCIN HYDROCHLORIDE 125 MG: KIT at 08:02

## 2023-02-27 RX ADMIN — ARFORMOTEROL TARTRATE 15 MCG: 15 SOLUTION RESPIRATORY (INHALATION) at 07:02

## 2023-02-27 RX ADMIN — TRAMADOL HYDROCHLORIDE 50 MG: 50 TABLET, COATED ORAL at 08:02

## 2023-02-27 RX ADMIN — COLLAGENASE SANTYL: 250 OINTMENT TOPICAL at 08:02

## 2023-02-27 RX ADMIN — TRAMADOL HYDROCHLORIDE 50 MG: 50 TABLET, COATED ORAL at 05:02

## 2023-02-27 RX ADMIN — BUDESONIDE INHALATION 0.5 MG: 0.5 SUSPENSION RESPIRATORY (INHALATION) at 07:02

## 2023-02-27 RX ADMIN — CLONAZEPAM 1 MG: 1 TABLET ORAL at 08:02

## 2023-02-27 RX ADMIN — PIPERACILLIN SODIUM AND TAZOBACTAM SODIUM 3.38 G: 3; .375 INJECTION, POWDER, LYOPHILIZED, FOR SOLUTION INTRAVENOUS at 02:02

## 2023-02-27 RX ADMIN — BUDESONIDE INHALATION 0.5 MG: 0.5 SUSPENSION RESPIRATORY (INHALATION) at 09:02

## 2023-02-27 RX ADMIN — TRAMADOL HYDROCHLORIDE 50 MG: 50 TABLET, COATED ORAL at 03:02

## 2023-02-27 RX ADMIN — LEVOFLOXACIN 500 MG: 250 TABLET, FILM COATED ORAL at 08:02

## 2023-02-27 RX ADMIN — FOLIC ACID 1 MG: 1 TABLET ORAL at 08:02

## 2023-02-27 RX ADMIN — ARFORMOTEROL TARTRATE 15 MCG: 15 SOLUTION RESPIRATORY (INHALATION) at 09:02

## 2023-02-27 RX ADMIN — VANCOMYCIN HYDROCHLORIDE 125 MG: KIT at 03:02

## 2023-02-27 RX ADMIN — PANTOPRAZOLE SODIUM 40 MG: 40 TABLET, DELAYED RELEASE ORAL at 05:02

## 2023-02-27 RX ADMIN — QUETIAPINE 100 MG: 100 TABLET ORAL at 09:02

## 2023-02-27 RX ADMIN — VANCOMYCIN HYDROCHLORIDE 125 MG: KIT at 02:02

## 2023-02-27 RX ADMIN — ENOXAPARIN SODIUM 40 MG: 100 INJECTION SUBCUTANEOUS at 08:02

## 2023-02-27 RX ADMIN — GABAPENTIN 800 MG: 100 CAPSULE ORAL at 08:02

## 2023-02-27 RX ADMIN — TRAMADOL HYDROCHLORIDE 50 MG: 50 TABLET, COATED ORAL at 09:02

## 2023-02-27 NOTE — CARE UPDATE
02/27/23 0728   Patient Assessment/Suction   Level of Consciousness (AVPU) alert   Respiratory Effort Normal;Unlabored   Expansion/Accessory Muscles/Retractions no use of accessory muscles;no retractions   All Lung Fields Breath Sounds equal bilaterally;clear;diminished   PRE-TX-O2   Device (Oxygen Therapy) room air   SpO2 95 %   Pulse Oximetry Type Intermittent   $ Pulse Oximetry - Multiple Charge Pulse Oximetry - Multiple   Aerosol Therapy   $ Aerosol Therapy Charges Aerosol Treatment   Daily Review of Necessity (SVN) completed   Respiratory Treatment Status (SVN) given   Treatment Route (SVN) mask;oxygen   Patient Position (SVN) HOB elevated   Post Treatment Assessment (SVN) breath sounds unchanged   Signs of Intolerance (SVN) none   Education   $ Education Bronchodilator;15 min   Respiratory Evaluation   $ Care Plan Tech Time 15 min   $ Eval/Re-eval Charges Re-evaluation

## 2023-02-27 NOTE — CARE UPDATE
02/26/23 2050   Patient Assessment/Suction   Level of Consciousness (AVPU) alert   Respiratory Effort Normal;Unlabored   Expansion/Accessory Muscles/Retractions no use of accessory muscles   All Lung Fields Breath Sounds equal bilaterally;clear   PRE-TX-O2   Device (Oxygen Therapy) room air   SpO2 98 %   Pulse Oximetry Type Intermittent   $ Pulse Oximetry - Multiple Charge Pulse Oximetry - Multiple   Pulse 78   Resp 18   Aerosol Therapy   $ Aerosol Therapy Charges Aerosol Treatment   Daily Review of Necessity (SVN) completed   Respiratory Treatment Status (SVN) given   Treatment Route (SVN) mask   Patient Position (SVN) HOB elevated   Post Treatment Assessment (SVN) breath sounds unchanged   Signs of Intolerance (SVN) none   Breath Sounds Post-Respiratory Treatment   Post-treatment Heart Rate (beats/min) 73   Post-treatment Resp Rate (breaths/min) 18   Education   $ Education Bronchodilator;15 min   Respiratory Evaluation   $ Care Plan Tech Time 15 min   $ Eval/Re-eval Charges Re-evaluation   Evaluation For Re-Eval 5+ day

## 2023-02-28 LAB
ANA TITR SER IF: NEGATIVE {TITER}
C3 SERPL-MCNC: 144 MG/DL (ref 82–167)
C4 SERPL-MCNC: 17 MG/DL (ref 12–38)

## 2023-02-28 PROCEDURE — 94799 UNLISTED PULMONARY SVC/PX: CPT

## 2023-02-28 PROCEDURE — 63600175 PHARM REV CODE 636 W HCPCS: Performed by: INTERNAL MEDICINE

## 2023-02-28 PROCEDURE — 25000242 PHARM REV CODE 250 ALT 637 W/ HCPCS: Performed by: INTERNAL MEDICINE

## 2023-02-28 PROCEDURE — 25000003 PHARM REV CODE 250: Performed by: INTERNAL MEDICINE

## 2023-02-28 PROCEDURE — 12000002 HC ACUTE/MED SURGE SEMI-PRIVATE ROOM

## 2023-02-28 PROCEDURE — 99900035 HC TECH TIME PER 15 MIN (STAT)

## 2023-02-28 PROCEDURE — 94640 AIRWAY INHALATION TREATMENT: CPT

## 2023-02-28 PROCEDURE — 94761 N-INVAS EAR/PLS OXIMETRY MLT: CPT

## 2023-02-28 PROCEDURE — 99900031 HC PATIENT EDUCATION (STAT)

## 2023-02-28 PROCEDURE — 25000003 PHARM REV CODE 250: Performed by: NURSE PRACTITIONER

## 2023-02-28 RX ORDER — FOLIC ACID 1 MG/1
1 TABLET ORAL DAILY
Qty: 30 TABLET | Refills: 0 | Status: SHIPPED | OUTPATIENT
Start: 2023-03-01 | End: 2024-02-29

## 2023-02-28 RX ORDER — LEVOFLOXACIN 500 MG/1
500 TABLET, FILM COATED ORAL
Qty: 4 TABLET | Refills: 0 | Status: SHIPPED | OUTPATIENT
Start: 2023-03-01 | End: 2023-03-05

## 2023-02-28 RX ORDER — VANCOMYCIN HYDROCHLORIDE 125 MG/1
125 CAPSULE ORAL EVERY 6 HOURS
Qty: 20 CAPSULE | Refills: 0 | Status: SHIPPED | OUTPATIENT
Start: 2023-02-28 | End: 2023-03-05

## 2023-02-28 RX ORDER — ALBUTEROL SULFATE 90 UG/1
2 AEROSOL, METERED RESPIRATORY (INHALATION) EVERY 6 HOURS PRN
Qty: 18 G | Refills: 0 | Status: SHIPPED | OUTPATIENT
Start: 2023-02-28 | End: 2024-02-28

## 2023-02-28 RX ADMIN — BUDESONIDE INHALATION 0.5 MG: 0.5 SUSPENSION RESPIRATORY (INHALATION) at 07:02

## 2023-02-28 RX ADMIN — LEVOFLOXACIN 500 MG: 250 TABLET, FILM COATED ORAL at 05:02

## 2023-02-28 RX ADMIN — ARFORMOTEROL TARTRATE 15 MCG: 15 SOLUTION RESPIRATORY (INHALATION) at 07:02

## 2023-02-28 RX ADMIN — ENOXAPARIN SODIUM 40 MG: 100 INJECTION SUBCUTANEOUS at 08:02

## 2023-02-28 RX ADMIN — VANCOMYCIN HYDROCHLORIDE 125 MG: KIT at 03:02

## 2023-02-28 RX ADMIN — PANTOPRAZOLE SODIUM 40 MG: 40 TABLET, DELAYED RELEASE ORAL at 05:02

## 2023-02-28 RX ADMIN — CLONAZEPAM 1 MG: 1 TABLET ORAL at 05:02

## 2023-02-28 RX ADMIN — GABAPENTIN 800 MG: 100 CAPSULE ORAL at 02:02

## 2023-02-28 RX ADMIN — FOLIC ACID 1 MG: 1 TABLET ORAL at 09:02

## 2023-02-28 RX ADMIN — VANCOMYCIN HYDROCHLORIDE 125 MG: KIT at 09:02

## 2023-02-28 RX ADMIN — GABAPENTIN 800 MG: 100 CAPSULE ORAL at 08:02

## 2023-02-28 RX ADMIN — QUETIAPINE 100 MG: 100 TABLET ORAL at 08:02

## 2023-02-28 RX ADMIN — VANCOMYCIN HYDROCHLORIDE 125 MG: KIT at 02:02

## 2023-02-28 RX ADMIN — Medication 6 MG: at 10:02

## 2023-02-28 RX ADMIN — GABAPENTIN 800 MG: 100 CAPSULE ORAL at 09:02

## 2023-02-28 RX ADMIN — VANCOMYCIN HYDROCHLORIDE 125 MG: KIT at 08:02

## 2023-02-28 RX ADMIN — TRAMADOL HYDROCHLORIDE 50 MG: 50 TABLET, COATED ORAL at 03:02

## 2023-02-28 RX ADMIN — TRAMADOL HYDROCHLORIDE 50 MG: 50 TABLET, COATED ORAL at 08:02

## 2023-02-28 RX ADMIN — TRAMADOL HYDROCHLORIDE 50 MG: 50 TABLET, COATED ORAL at 09:02

## 2023-02-28 RX ADMIN — COLLAGENASE SANTYL: 250 OINTMENT TOPICAL at 09:02

## 2023-02-28 NOTE — PROGRESS NOTES
[facility] Medicine  Progress Note    Patient name: Elder Rosales  MRN: [id number]  Admit Date: 2/19/2023   LOS: 8 days     SUBJECTIVE:     Principal problem: Fever    Interval History:  No new events overnight. Tolerating diet though still has some odynophagia. CPK repeated and normalized and thus not contributing to the pain to his feet.  I discussed with LEIF sitter and patient witnessed ambulating to and from bathroom in room without any external signs of discomfort but he maintains to me that it is excruciating and he cannot walk.  There seems to be a disconnect. I did discuss with Neurology and the next step is a nerve conduction study which will be done in their office as not done in the inpatient setting. Care plan discussed with patient.  He is medically clear for discharge to inpatient psych.  Once accepted and a bed is available, he will be discharged on 3 more days of levaquin for possible aspiration pneumonia- he is presently on room air and no further low grade temps since 2/25.  He will also be discharged on 5 more days of oral vanc to complete 10 day course of vanc for c diff.  Stool is now solid.  Rx for folic acid and protonix should be provided.     Hospital Course:  Per Dr. Denson:  Patient with a history of alcoholism, smoker, vertigo, history of IVDU/polysubstance abuse but denies recent, treated hepatitis-C.  States he went out fishing/camping, believes he had an attack of vertigo, states subsequent day he was generally weak, it took him 4 days to come out of the camp.  States he was having dark stool.  He presented to the ED, expressed suicidal ideation on background history of depression, he was seen by tele psych LEIF Adams, recommends Seroquel 100 q.h.s., Remeron 15 mg q.h.s. Admission labs with hyponatremia 120 with severe hypokalemia, transaminitis with elevation of lipase.  He was admitted to the ICU, GI and Nephrology consultation, IV fluid hydration,  continuous PPI therapy, esophagram.  On 02/22, esophagram with no evidence of esophageal perforation or tears, starting liquid diet and advancing as tolerated, once medically cleared will need inpatient psychiatric placement.  On 02/22 C diff is positive and started on oral vancomycin. He reports bilateral severe burning feet pain, he is on gabapentin 800 mg t.i.d. at home, states pain is worsening, ultrasound legs no DVT, MRI lumbosacral spine no evidence of nerve compromise, neurology consulted, discussed gabapentin, Lyrica, Cymbalta, patient is requesting IV narcotic medications despite counseling.  On 02/25 did have intermittent fevers up to 100.4, fever workup repeated including UA (negative), chest x-ray with possible lower lung changes and empirically started on Zosyn, blood culture no growth to date, rapid COVID negative, procalcitonin 0.78.  On 2/26 continues with irritability and requesting IV narcotics despite counseling on feet pain, seen and examined with RN and sitter in room, using profanity, transfer care to another hospitalist as discussed with patient as feels pain not being appropriately managed    I, Dr. Correa, assumed care 2/26.  I reviewed the above care and examined the patient and also feel that IV narcotics are to clinically indicated. I, too, offered to change his neuropathic medication and he preferred to stay on his gabapentin. I did agree to restart his home Klonopin.  I reviewed  and last filled December and thus I felt reasonable to restart.     Scheduled Meds:   budesonide  0.5 mg Nebulization Q12H    And    arformoteroL  15 mcg Nebulization BID    collagenase   Topical (Top) Daily    enoxparin  40 mg Subcutaneous Q24H    folic acid  1 mg Oral Daily    gabapentin  800 mg Oral TID    levoFLOXacin  500 mg Oral Before breakfast    pantoprazole  40 mg Oral Daily    QUEtiapine  100 mg Oral QHS    vancomycin  125 mg Oral Q6H     Continuous Infusions:  PRN Meds:acetaminophen, calcium  gluconate IVPB, calcium gluconate IVPB, calcium gluconate IVPB, clonazePAM, magnesium sulfate IVPB, magnesium sulfate IVPB, melatonin, ondansetron, potassium chloride **AND** potassium chloride **AND** potassium chloride, sodium chloride 0.9%, sodium phosphate IVPB, sodium phosphate IVPB, sodium phosphate IVPB, traMADoL    Review of patient's allergies indicates:  No Known Allergies    Review of Systems: As per interval history    OBJECTIVE:     Vital Signs (Most Recent)  Temp: 98.4 °F (36.9 °C) (02/27/23 2100)  Pulse: 62 (02/27/23 2100)  Resp: 16 (02/27/23 2145)  BP: (!) 140/82 (02/27/23 2100)  SpO2: 96 % (02/27/23 2100)    Vital Signs Range (Last 24H):  Temp:  [98.1 °F (36.7 °C)-99 °F (37.2 °C)]   Pulse:  []   Resp:  [16-20]   BP: (102-143)/(58-89)   SpO2:  [95 %-100 %]     I & O (Last 24H):  Intake/Output Summary (Last 24 hours) at 2/27/2023 2215  Last data filed at 2/27/2023 1835  Gross per 24 hour   Intake 1660 ml   Output 704 ml   Net 956 ml       Physical Exam:    Vitals and nursing note reviewed.     Constitutional:       General: Not in acute distress.     Appearance: Well-developed.   HENT:      Head: Normocephalic and atraumatic.   Eyes:      Pupils: Pupils are equal, round, and reactive to light.   Cardiovascular:   Strong bilateral dorsalis pedis pulses  Feet/toes are warm with normal color and no erythema, swelling or joint effusions      .   Pulmonary:      Effort: Pulmonary effort is normal.       Abdominal:      General: There is no distension.       Musculoskeletal:         General: Normal range of motion.      Cervical back: Normal range of motion.   Skin:     Findings: No rash.   Neurological:      Mental Status: Alert and oriented to person, place, and time.      Cranial Nerves: No cranial nerve deficit.      Sensory: No sensory deficit.     Laboratory:  I have reviewed all pertinent lab results within the past 24 hours.  CBC:   Recent Labs   Lab 02/27/23  0503   WBC 10.54   RBC 3.80*    HGB 11.5*   HCT 35.2*      MCV 93   MCH 30.3   MCHC 32.7     CMP:   Recent Labs   Lab 02/27/23  0503   *   CALCIUM 9.4   ALBUMIN 3.0*   PROT 6.5      K 4.0   CO2 28      BUN 11   CREATININE 0.7   ALKPHOS 68   ALT 32   AST 30   BILITOT 0.2       Diagnostic Results:      ASSESSMENT/PLAN:         Active Hospital Problems    Diagnosis  POA    Pain in both feet [M79.671, M79.672]  Yes     Chronic    Folate deficiency [E53.8]  Yes     Chronic    Clostridium difficile infection [A49.8]  Yes    Depression [F32.A]  Yes     Chronic    Anemia [D64.9]  Yes     Chronic    Suicidal ideation [R45.851]  Not Applicable     Chronic    Hepatitis C virus infection cured after antiviral drug therapy [Z86.19]  Yes     s/p epclusa, svr12 - 3/2021 (treated / cured)      Nicotine abuse [Z72.0]  Yes    Polysubstance dependence including opioid type drug, episodic abuse, with delirium [F11.221]  Yes      Resolved Hospital Problems    Diagnosis Date Resolved POA    *Fever [R50.9] 02/27/2023 No    Hypomagnesemia [E83.42] 02/26/2023 No    Hypophosphatemia [E83.39] 02/26/2023 No    Camila-Portillo tear, suspected [K22.6] 02/23/2023 Yes    Thrombocytopenia [D69.6] 02/22/2023 Yes    Acute pancreatitis [K85.90] 02/22/2023 Yes    Enterocolitis [K52.9] 02/19/2023 Yes    Hyponatremia [E87.1] 02/24/2023 Yes    Low blood magnesium [R79.0] 02/21/2023 Yes    JAIME (acute kidney injury) [N17.9] 02/21/2023 Yes    Hypokalemia [E87.6] 02/23/2023 Yes    Chronic hepatitis C virus infection [B18.2] 02/21/2023 Yes         Plan:     -Day 5/10 day course of vancomycin for c diff.  Stool has formed up.  -Low grade temps.  Infectious workup seemingly significant for possible pneumonia.  Aspiration during vomiting?  IV zosyn transitioned to levaquin and will plan to complete a 5 day course.   -Gabapentin for suspected neuropathy to bilateral feet  -B12 is normal.  Folic acid is low and being replaced  -s/p MRI spine with no acute process to  explain bilateral feet pain.  US with no dVT.  No clinical signs of arterial insufficiency.   -Will follow up with Neurology for nerve conduction studies.   -prn ultram  -prn Klonopin per home dose for anxiety  -PPI.  Esophagram negative for perforation. Tolerating food but still with some odynophagia.   -JAIME resolved.   -I have continued to hold reported home BP meds as has remained normotensive off them.  -Medically clear for inpatient psych and will be transferred once an accepting facility obtained. Discharge orders pended.       VTE Risk Mitigation (From admission, onward)           Ordered     enoxaparin injection 40 mg  Every 24 hours (non-standard times)         02/21/23 1151     IP VTE LOW RISK PATIENT  Once         02/19/23 1822                      Department Hospital Medicine  [facility]  Balta Correa MD  Date of service: 02/27/2023

## 2023-02-28 NOTE — CONSULTS
Left 2.5x2.4x0.1cm and right 2x2.2x0.1cm hip pink wound bed with thin yellow film,  pink periwound.  Cleaned and dried and applied Santyl, covered with Mepilex

## 2023-02-28 NOTE — CARE UPDATE
02/28/23 0754   Patient Assessment/Suction   Level of Consciousness (AVPU) alert   Respiratory Effort Normal;Unlabored   Expansion/Accessory Muscles/Retractions no use of accessory muscles;no retractions   All Lung Fields Breath Sounds equal bilaterally;clear   PRE-TX-O2   Device (Oxygen Therapy) room air   SpO2 98 %   Pulse Oximetry Type Intermittent   $ Pulse Oximetry - Multiple Charge Pulse Oximetry - Multiple   Aerosol Therapy   $ Aerosol Therapy Charges Refused;Aerosol Treatment   Respiratory Evaluation   $ Care Plan Tech Time 15 min   $ Eval/Re-eval Charges Re-evaluation

## 2023-02-28 NOTE — RESPIRATORY THERAPY
02/27/23 2100   Patient Assessment/Suction   Level of Consciousness (AVPU) alert   Respiratory Effort Unlabored   Expansion/Accessory Muscles/Retractions no use of accessory muscles   All Lung Fields Breath Sounds clear   PRE-TX-O2   Device (Oxygen Therapy) room air   SpO2 96 %   Pulse Oximetry Type Intermittent   $ Pulse Oximetry - Multiple Charge Pulse Oximetry - Multiple   Pulse 62   Resp 18   Temp 98.4 °F (36.9 °C)   BP (!) 140/82   Aerosol Therapy   $ Aerosol Therapy Charges Aerosol Treatment   Daily Review of Necessity (SVN) completed   Respiratory Treatment Status (SVN) given   Treatment Route (SVN) mask;oxygen   Patient Position (SVN) HOB elevated   Post Treatment Assessment (SVN) breath sounds unchanged   Breath Sounds Post-Respiratory Treatment   Throughout All Fields Post-Treatment no change   Post-treatment Heart Rate (beats/min) 70   Post-treatment Resp Rate (breaths/min) 18   Education   $ Education Bronchodilator;DME Nebulizer;DME Oxygen;15 min   Respiratory Evaluation   $ Care Plan Tech Time 15 min   $ Eval/Re-eval Charges Re-evaluation

## 2023-02-28 NOTE — PROGRESS NOTES
[facility]  Adult Nutrition   Progress Note (Follow-Up)    SUMMARY      Recommendations:   1. Continue [place] diet.   2. Continue Banatrol, 1 pkt daily for help with loose stools.  3. RD to continue to monitor as needed.     Goals:   Goals: Po intake >75% of meals    Dietitian Rounds Brief  Patient eating 100% per sitter, pt sleeping. Pt noted to have medications that help with appetite. Last BM 2/28/23. Plan is for pt to dc to inpatient psych facility soon. RD to  follow.    Diet order: [place]    % Intake of Estimated Energy Needs: 75 - 100 %  % Meal Intake: 75 - 100 %    Estimated/Assessed Needs  Weight Used For Calorie Calculations: 84 kg (185 lb 3 oz)  Energy Calorie Requirements (kcal): 2100-250 (25-30 kcal/kg)  Energy Need Method: Kcal/kg  Protein Requirements:  (1.0-1.5 gm/kg)  Weight Used For Protein Calculations: 84 kg (185 lb 3 oz)  Fluid Requirements (mL): 2940 (35 ml/kg)     RDA Method (mL): 2100     Weight History:  Wt Readings from Last 5 Encounters:   02/25/23 83.7 kg (184 lb 8.4 oz)   04/15/22 83.5 kg (184 lb)   01/17/22 102.1 kg (225 lb)   11/06/21 102.1 kg (225 lb)   11/18/20 89.8 kg (198 lb)      Reason for Assessment  Reason For Assessment: other (see comments) (ICU admit)  Diagnosis: other (see comments) (Acute pancreatitis)  Relevant Medical History: Polysubstance dependence, IVDU, hep C    Medications:Pertinent Medications Reviewed  Scheduled Meds:   budesonide  0.5 mg Nebulization Q12H    And    arformoteroL  15 mcg Nebulization BID    collagenase   Topical (Top) Daily    enoxparin  40 mg Subcutaneous Q24H    folic acid  1 mg Oral Daily    gabapentin  800 mg Oral TID    levoFLOXacin  500 mg Oral Before breakfast    pantoprazole  40 mg Oral Daily    QUEtiapine  100 mg Oral QHS    vancomycin  125 mg Oral Q6H     Continuous Infusions:  PRN Meds:.acetaminophen, calcium gluconate IVPB, calcium gluconate IVPB, calcium gluconate IVPB, clonazePAM, magnesium sulfate IVPB, magnesium  sulfate IVPB, melatonin, ondansetron, potassium chloride **AND** potassium chloride **AND** potassium chloride, sodium chloride 0.9%, sodium phosphate IVPB, sodium phosphate IVPB, sodium phosphate IVPB, traMADoL    Labs: Pertinent Labs Reviewed  Clinical Chemistry:  Recent Labs   Lab 02/24/23  0559 02/25/23  0601 02/26/23  0653 02/27/23  0503    138 136 140   K 4.1 4.5 4.3 4.0    99 101 103   CO2 30* 31* 27 28   * 132* 167* 127*   BUN 9 12 14 11   CREATININE 0.6 0.8 0.8 0.7   CALCIUM 9.3 9.6 9.2 9.4   PROT  --   --   --  6.5   ALBUMIN  --   --   --  3.0*   BILITOT  --   --   --  0.2   ALKPHOS  --   --   --  68   AST  --   --   --  30   ALT  --   --   --  32   ANIONGAP 6* 8 8 9   MG 1.7 1.4* 1.7 1.6   PHOS 1.1* 2.3* 3.2  --      CBC:   Recent Labs   Lab 02/27/23  0503   WBC 10.54   RBC 3.80*   HGB 11.5*   HCT 35.2*      MCV 93   MCH 30.3   MCHC 32.7     Cardiac Profile:  Recent Labs   Lab 02/27/23  0503   CPK 15*     Inflammatory Labs:  Recent Labs   Lab 02/26/23  1034   CRP 3.08*     Monitor and Evaluation  Food and Nutrient Intake: energy intake, food and beverage intake  Food and Nutrient Adminstration: diet order  Knowledge/Beliefs/Attitudes: food and nutrition knowledge/skill  Physical Activity and Function: nutrition-related ADLs and IADLs  Anthropometric Measurements: weight, weight change, body mass index  Biochemical Data, Medical Tests and Procedures: electrolyte and renal panel, gastrointestinal profile, glucose/endocrine profile  Nutrition-Focused Physical Findings: overall appearance     Nutrition Risk  Level of Risk/Frequency of Follow-up: moderate     Nutrition Follow-Up  RD Follow-up?: Yes    Pennie Morales RD 02/28/2023 2:21 PM

## 2023-03-01 PROCEDURE — 94761 N-INVAS EAR/PLS OXIMETRY MLT: CPT

## 2023-03-01 PROCEDURE — 25000003 PHARM REV CODE 250: Performed by: INTERNAL MEDICINE

## 2023-03-01 PROCEDURE — 99900031 HC PATIENT EDUCATION (STAT)

## 2023-03-01 PROCEDURE — 94799 UNLISTED PULMONARY SVC/PX: CPT

## 2023-03-01 PROCEDURE — 63600175 PHARM REV CODE 636 W HCPCS: Performed by: INTERNAL MEDICINE

## 2023-03-01 PROCEDURE — 99900035 HC TECH TIME PER 15 MIN (STAT)

## 2023-03-01 PROCEDURE — 94640 AIRWAY INHALATION TREATMENT: CPT

## 2023-03-01 PROCEDURE — 25000003 PHARM REV CODE 250: Performed by: NURSE PRACTITIONER

## 2023-03-01 PROCEDURE — 25000242 PHARM REV CODE 250 ALT 637 W/ HCPCS: Performed by: INTERNAL MEDICINE

## 2023-03-01 PROCEDURE — 12000002 HC ACUTE/MED SURGE SEMI-PRIVATE ROOM

## 2023-03-01 RX ADMIN — VANCOMYCIN HYDROCHLORIDE 125 MG: KIT at 09:03

## 2023-03-01 RX ADMIN — ARFORMOTEROL TARTRATE 15 MCG: 15 SOLUTION RESPIRATORY (INHALATION) at 07:03

## 2023-03-01 RX ADMIN — VANCOMYCIN HYDROCHLORIDE 125 MG: KIT at 03:03

## 2023-03-01 RX ADMIN — TRAMADOL HYDROCHLORIDE 50 MG: 50 TABLET, COATED ORAL at 02:03

## 2023-03-01 RX ADMIN — BUDESONIDE INHALATION 0.5 MG: 0.5 SUSPENSION RESPIRATORY (INHALATION) at 09:03

## 2023-03-01 RX ADMIN — TRAMADOL HYDROCHLORIDE 50 MG: 50 TABLET, COATED ORAL at 06:03

## 2023-03-01 RX ADMIN — BUDESONIDE INHALATION 0.5 MG: 0.5 SUSPENSION RESPIRATORY (INHALATION) at 07:03

## 2023-03-01 RX ADMIN — VANCOMYCIN HYDROCHLORIDE 125 MG: KIT at 02:03

## 2023-03-01 RX ADMIN — ARFORMOTEROL TARTRATE 15 MCG: 15 SOLUTION RESPIRATORY (INHALATION) at 09:03

## 2023-03-01 RX ADMIN — GABAPENTIN 800 MG: 100 CAPSULE ORAL at 03:03

## 2023-03-01 RX ADMIN — TRAMADOL HYDROCHLORIDE 50 MG: 50 TABLET, COATED ORAL at 11:03

## 2023-03-01 RX ADMIN — ENOXAPARIN SODIUM 40 MG: 100 INJECTION SUBCUTANEOUS at 08:03

## 2023-03-01 RX ADMIN — LEVOFLOXACIN 500 MG: 250 TABLET, FILM COATED ORAL at 05:03

## 2023-03-01 RX ADMIN — QUETIAPINE 100 MG: 100 TABLET ORAL at 08:03

## 2023-03-01 RX ADMIN — VANCOMYCIN HYDROCHLORIDE 125 MG: KIT at 08:03

## 2023-03-01 RX ADMIN — PANTOPRAZOLE SODIUM 40 MG: 40 TABLET, DELAYED RELEASE ORAL at 05:03

## 2023-03-01 RX ADMIN — GABAPENTIN 800 MG: 100 CAPSULE ORAL at 08:03

## 2023-03-01 RX ADMIN — GABAPENTIN 800 MG: 100 CAPSULE ORAL at 09:03

## 2023-03-01 RX ADMIN — TRAMADOL HYDROCHLORIDE 50 MG: 50 TABLET, COATED ORAL at 09:03

## 2023-03-01 RX ADMIN — CLONAZEPAM 1 MG: 1 TABLET ORAL at 05:03

## 2023-03-01 NOTE — DISCHARGE SUMMARY
"[facility] Medicine  Discharge Summary      Patient Name: Elder Rosales  MRN: [id number]  LONNIE: [id number]  Patient Class: IP- Inpatient  Admission Date: 2/19/2023  Hospital Length of Stay: 9 days  Discharge Date and Time:  02/28/2023 6:45 PM  Attending Physician: Deejay Monae MD   Discharging Provider: Deejay Monae MD  Primary Care Provider: [facility]    Primary Care Team: Networked reference to record PCT     HPI:   Patient is a 43 yo cauc male with hx ETOH, >20y/pack/d Reformed smoker, vertigo, ADHD, IVDU, Polysubstance abused,treated Hep c . He reports going camping and fishing 3 days ago and experienced vertigo, abdominal pain, N/V, falling and imbalance, hearing voices in his head and talking to himself. He report dark black stools for 4 days however hbg is stable. He states, " he stop use to smoke a pack/d, and drink 12-15 beers a day but stopped a month ago". While in the ED reported suicidal thoughts and depression and asked to speak with a psychiatrist. He was evaluated by telepsych. On assessment patient was fidgeting, difficulty focusing, not staying on track with questions asked and gaurded to left hip and back. He denied drug use however past documentation states iv drug user,and  polysubstance abuse. He denies chest pain, dysuria, fever, and chills.     Hospitalist asked to admit for hyponatermia , pancreatitis, enterocolitis, abd ct abnormality. Nephrology, GI consulted in ED.      Hospital Course:   Patient with a history of alcoholism, smoker, vertigo, history of IVDU/polysubstance abuse but denies recent, treated hepatitis-C.  States he went out fishing/camping, believes he had an attack of vertigo, states subsequent day he was generally weak, it took him 4 days to come out of the camp.  States he was having dark stool.  He presented to the ED, expressed suicidal ideation on background history of depression, he " was seen by tele psych Dr Fountain, [facility], recommends Seroquel 100 q.h.s., Remeron 15 mg q.h.s. Admission labs with hyponatremia 120 with severe hypokalemia, transaminitis with elevation of lipase.  He was admitted to the ICU, GI and Nephrology consultation, IV fluid hydration, continuous PPI therapy, esophagram.  On 02/22, esophagram with no evidence of esophageal perforation or tears, starting liquid diet and advancing as tolerated, once medically cleared will need inpatient psychiatric placement.  On 02/22 C diff is positive and started on oral vancomycin. He reports bilateral severe burning feet pain, he is on gabapentin 800 mg t.i.d. at home, states pain is worsening, ultrasound legs no DVT, MRI lumbosacral spine no evidence of nerve compromise, neurology consulted, discussed gabapentin, Lyrica, Cymbalta, patient is requesting IV narcotic medications despite counseling.  On 02/25 did have intermittent fevers up to 100.4, fever workup repeated including UA (negative), chest x-ray with possible lower lung changes and empirically started on Zosyn, blood culture no growth to date, rapid COVID negative, procalcitonin 0.78.  On 2/26 continues with irritability and requesting IV narcotics despite counseling on feet pain, seen and examined with RN and sitter in room, using profanity, transfer care to another hospitalist as discussed with patient as feels pain not being appropriately managed.  Today the patient remains comfortable and nontoxic.  Continuing oral vancomycin for C diff #6/10.  Continuing oral Levaquin for possible aspiration pneumonia.  Continue gabapentin.  Continue p.r.n. Klonopin.    -Medically clear for inpatient psych and will be transferred once an accepting facility obtained. Discharge orders pended.  Anticipate discharge next 24 hours    Discharge physical exam:   Comfortable appearing, nontoxic nondiaphoretic  Comfortable work of breathing, lungs are clear bilaterally   2+ radial pulses, regular rate  and rhythm   Abdomen soft and nontender   Mood withdrawn, insight fair, interacting appropriately    Goals of Care Treatment Preferences:  Code Status: Full Code      Consults:   Consults (From admission, onward)          Status Ordering Provider     Inpatient consult to Neurology  Once        Provider:  Henry Cortez MD    Completed JIE AUSTIN     Inpatient consult to Telemedicine - Psychiatry  Once        Provider:  Simona Fountain MD    Acknowledged ALEX WADSWORTH     Inpatient consult to Hospitalist  Once        Provider:  Carlos Reis MD    Acknowledged ALEX WADSWORTH     Inpatient consult to Nephrology  Once        Provider:  Tommie Chang MD    Acknowledged ALEX WADSWORTH            Final Active Diagnoses:    Diagnosis Date Noted POA    PRINCIPAL PROBLEM:  Suicidal ideation [R45.851] 02/19/2023 Not Applicable     Chronic    Pain in both feet [M79.671, M79.672] 02/25/2023 Yes     Chronic    Folate deficiency [E53.8] 02/24/2023 Yes     Chronic    Clostridium difficile infection [A49.8] 02/23/2023 Yes    Depression [F32.A] 02/21/2023 Yes     Chronic    Anemia [D64.9] 02/21/2023 Yes     Chronic    Hepatitis C virus infection cured after antiviral drug therapy [Z86.19]  Yes    Nicotine abuse [Z72.0] 09/03/2020 Yes    Polysubstance dependence including opioid type drug, episodic abuse, with delirium [F11.221] 10/04/2017 Yes      Problems Resolved During this Admission:    Diagnosis Date Noted Date Resolved POA    Fever [R50.9] 02/25/2023 02/27/2023 No    Hypomagnesemia [E83.42] 02/25/2023 02/26/2023 No    Hypophosphatemia [E83.39] 02/24/2023 02/26/2023 No    Camila-Portillo tear, suspected [K22.6] 02/21/2023 02/23/2023 Yes    Thrombocytopenia [D69.6] 02/21/2023 02/22/2023 Yes    Acute pancreatitis [K85.90] 02/19/2023 02/22/2023 Yes    Enterocolitis [K52.9] 02/19/2023 02/19/2023 Yes    Hyponatremia [E87.1] 02/19/2023 02/24/2023 Yes    Low blood magnesium [R79.0] 02/19/2023 02/21/2023 Yes    JAIME  (acute kidney injury) [N17.9] 02/19/2023 02/21/2023 Yes    Hypokalemia [E87.6] 02/19/2023 02/23/2023 Yes    Chronic hepatitis C virus infection [B18.2] 10/04/2017 02/21/2023 Yes       Discharged Condition: stable    Disposition: Psychiatric Hospital    Follow Up:   Follow-up Information       Access [facility]. Schedule an appointment as soon as possible for a visit.    Why: post hospital discharge follow up  Contact information:  [address]  [place] [id number]  [phone]               Hedy Wong MD. Schedule an appointment as soon as possible for a visit.    Specialty: Neurology  Why: evaluation of neuropathy to your feet  Contact information:  [address]  [place] [address]  [phone]                           Patient Instructions:      Diet Adult Regular     Notify your health care provider if you experience any of the following:  temperature >100.4     Notify your health care provider if you experience any of the following:  persistent nausea and vomiting or diarrhea     Notify your health care provider if you experience any of the following:  severe uncontrolled pain     Notify your health care provider if you experience any of the following:  difficulty breathing or increased cough     Notify your health care provider if you experience any of the following:  increased confusion or weakness     Change dressing (specify)   Order Comments: Bilat hips Clean with chlorhexidine/ns. Pat dry  Apply santyl and cover with mepilex.     Bug bites and scratches  Clean with chlorhexidine/ns. Pat dry. Apply Calmoseptine and cover with mepilex if needed Daily.     Activity as tolerated       Pending Diagnostic Studies:       Procedure Component Value Units Date/Time    Methylmalonic acid, serum [[id number]] Collected: 02/26/23 1034    Order Status: Sent Lab Status: In process Updated: 02/26/23 1040    Specimen: Blood     Vitamin B1 [[id number]] Collected: 02/26/23 1034     Order Status: Sent Lab Status: In process Updated: 02/26/23 1040    Specimen: Blood     Vitamin B6 [[id number]] Collected: 02/26/23 1034    Order Status: Sent Lab Status: In process Updated: 02/26/23 1040    Specimen: Blood            Medications:  Reconciled Home Medications:      Medication List        START taking these medications      albuterol 90 mcg/actuation inhaler  Commonly known as: VENTOLIN HFA  Inhale 2 puffs into the lungs every 6 (six) hours as needed for Wheezing. Rescue     folic acid 1 MG tablet  Commonly known as: FOLVITE  Take 1 tablet (1 mg total) by mouth once daily.  Start taking on: March 1, 2023     levoFLOXacin 500 MG tablet  Commonly known as: LEVAQUIN  Take 1 tablet (500 mg total) by mouth before breakfast. for 4 days  Start taking on: March 1, 2023     vancomycin 125 MG capsule  Commonly known as: VANCOCIN  Take 1 capsule (125 mg total) by mouth every 6 (six) hours. for 5 days            CHANGE how you take these medications      clonazePAM 1 MG tablet  Commonly known as: KlonoPIN  Take 1 mg by mouth daily as needed.  What changed: Another medication with the same name was removed. Continue taking this medication, and follow the directions you see here.     gabapentin 800 MG tablet  Commonly known as: NEURONTIN  gabapentin 800 mg tablet   TAKE 1 TABLET BY MOUTH THREE TIMES DAILY  What changed: Another medication with the same name was removed. Continue taking this medication, and follow the directions you see here.     QUEtiapine 100 MG Tab  Commonly known as: SEROQUEL  Take 100 mg by mouth every evening.  What changed: Another medication with the same name was removed. Continue taking this medication, and follow the directions you see here.            CONTINUE taking these medications      meclizine 25 mg tablet  Commonly known as: ANTIVERT  Take 1 tablet (25 mg total) by mouth 3 (three) times daily as needed for Dizziness.            STOP taking these medications      amLODIPine 5 MG  tablet  Commonly known as: NORVASC     buprenorphine-naloxone 8-2 8-2 mg Subl  Commonly known as: SUBOXONE     buprenorphine-naloxone 8-2 mg 8-2 mg  Commonly known as: SUBOXONE     buPROPion 150 MG TB24 tablet  Commonly known as: WELLBUTRIN XL     busPIRone 10 MG tablet  Commonly known as: BUSPAR     carvediloL 3.125 MG tablet  Commonly known as: COREG     cetirizine 10 MG tablet  Commonly known as: ZYRTEC     DULoxetine 30 MG capsule  Commonly known as: CYMBALTA     fluticasone propionate 50 mcg/actuation nasal spray  Commonly known as: FLONASE     lisinopriL 10 MG tablet     lisinopriL 5 MG tablet  Commonly known as: PRINIVIL,ZESTRIL     ondansetron 4 MG Tbdl  Commonly known as: ZOFRAN-ODT     POLYTUSSIN DM 1-5-10 mg/5 mL Syrp  Generic drug: dexchlorphen-phenylephrine-DM     traZODone 100 MG tablet  Commonly known as: DESYREL              Indwelling Lines/Drains at time of discharge:   Lines/Drains/Airways       None                   Time spent on the discharge of patient: 32 minutes         Deejay Monae MD  Department of Hospital Medicine  [facility]

## 2023-03-01 NOTE — SUBJECTIVE & OBJECTIVE
Interval History: PEC's, diarrhea improving. Inpatient Psych facility can not accept until C. Diff treatment completed.     Review of Systems   Constitutional:  Positive for fever.   Respiratory:  Negative for cough.    Gastrointestinal:  Negative for nausea and vomiting.   Genitourinary:  Negative for difficulty urinating.   Psychiatric/Behavioral:  Positive for dysphoric mood.    Objective:     Vital Signs (Most Recent):  Temp: 98.4 °F (36.9 °C) (03/01/23 0837)  Pulse: 109 (03/01/23 0837)  Resp: 18 (03/01/23 0837)  BP: (!) 141/89 (03/01/23 0837)  SpO2: 98 % (03/01/23 0720)   Vital Signs (24h Range):  Temp:  [97 °F (36.1 °C)-99 °F (37.2 °C)] 98.4 °F (36.9 °C)  Pulse:  [] 109  Resp:  [16-18] 18  SpO2:  [97 %-100 %] 98 %  BP: (114-145)/(68-90) 141/89     Weight: 83.7 kg (184 lb 8.4 oz)  Body mass index is 25.03 kg/m².  No intake or output data in the 24 hours ending 03/01/23 0838   Physical Exam  Vitals and nursing note reviewed.   Constitutional:       Comments: Lying in bed, initially sleeping but did awaken   HENT:      Head: Normocephalic and atraumatic.      Mouth/Throat:      Mouth: Mucous membranes are moist.      Comments: No oral thrush  Cardiovascular:      Rate and Rhythm: Normal rate and regular rhythm.   Pulmonary:      Comments: On room air, no accessory muscle use, no wheeze or crackles  Abdominal:      General: Bowel sounds are normal.      Palpations: Abdomen is soft.      Tenderness: There is no abdominal tenderness.   Neurological:      Mental Status: He is oriented to person, place, and time.      Comments: On touching feet reporting pain   Psychiatric:      Comments: Irritable, using profanities       Significant Labs: All pertinent labs within the past 24 hours have been reviewed.  CBC: No results for input(s): WBC, HGB, HCT, PLT in the last 48 hours.  CMP: No results for input(s): NA, K, CL, CO2, GLU, BUN, CREATININE, CALCIUM, PROT, ALBUMIN, BILITOT, ALKPHOS, AST, ALT, ANIONGAP, EGFRNONAA  in the last 48 hours.    Invalid input(s): ESTGFAFR[place]    Significant Imaging:   Imaging Results              US Abdomen Limited (Final result)  Result time 02/19/23 17:11:09      Final result by Naren Mendez MD (02/19/23 17:11:09)                   Narrative:    Abdominal ultrasound Limited    CLINICAL DATA: Hyperbilirubinemia    FINDINGS: Sonographic assessment targeted to the right upper quadrant was performed. The pancreas and midline retroperitoneum are obscured by bowel gas. Visualization of the liver is limited due to poor penetration. The liver is echogenic compatible with fatty infiltration. No mass is identified. Hepatopedal flow is noted within the portal vein. The gallbladder is mildly distended. No gallstones are identified. Common bile duct measures 5 mm.    The right kidney is normal in appearance. No free fluid is identified.    IMPRESSION:  1. Exam is limited as noted above.  2. Fatty infiltration of the liver.  3. No evidence of cholecystitis or obstruction.    Electronically signed by:  Naren Mendez MD  2/19/2023 5:11 PM CST Workstation: [id number]                                     CT Chest Without Contrast (Final result)  Result time 02/19/23 16:23:48      Final result by Naren Mendez MD (02/19/23 16:23:48)                   Narrative:    CT chest without contrast    CLINICAL DATA: Pneumomediastinum    CMS MANDATED QUALITY DATA - CT RADIATION  436    All CT scans at this facility utilize dose modulation, iterative reconstruction, and/or weight based dosing when appropriate to reduce radiation dose to as low as reasonably achievable.    Findings: Thin section axial noncontrast images were obtained from the thoracic inlet to the adrenal glands, following the administration of water-soluble oral contrast.    Again noted are findings of pneumomediastinum, with multiple bubbles of mediastinal air adjacent to the esophagus and left main bronchus. There is no contrast extravasation  to indicate esophageal perforation. No pleural effusions are demonstrated. There is mild circumferential wall thickening of the lower two thirds of the thoracic esophagus suggesting possible esophagitis. Correlate clinically.    Heart size is normal. The thoracic aorta is normal in caliber. No mediastinal lymphadenopathy is identified.    There are no pulmonary infiltrates or mass lesions. No pneumothorax is demonstrated.    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    IMPRESSION:  1. There is no identifiable extravasation of ingested water-soluble oral contrast material to indicate esophageal perforation. There is no pathologic mediastinal fluid collection or pleural effusion. If there is persistent clinical suspicion of esophageal perforation, endoscopy should be considered.  2. Mild pneumomediastinum, with air primarily around the left main bronchus and in a paraesophageal distribution    Electronically signed by:  Naren Mendez MD  2/19/2023 4:23 PM CST Workstation: [id number]                                     CT Abdomen Pelvis With Contrast (Final result)  Result time 02/19/23 14:46:17      Final result by Naren Mendez MD (02/19/23 14:46:17)                   Narrative:    CT ABDOMEN AND PELVIS WITH CONTRAST    HISTORY: Abdominal pain    CMS MANDATED QUALITY DATA - CT RADIATION  436    All CT scans at this facility utilize dose modulation, iterative reconstruction, and/or weight based dosing when appropriate to reduce radiation dose to as low as reasonably achievable    FINDINGS:    Post infusion images were obtained from the lung bases to the pubic symphysis. 100 cc nonionic contrast was administered for the examination.    The lung bases are unremarkable. Focal pneumomediastinum is noted, with multiple air bubbles noted posterior to the esophagus. There is mild circumferential wall thickening of the esophagus distally which could be on the basis of  underdistention or esophagitis.    There is marked hepatic steatosis. The gallbladder is mildly dilated. Gallbladder lumen is hyperdense in appearance, likely on the basis of hyperdense bile. Biliary tree is nondilated. The spleen has a normal appearance. There is subtle fat stranding adjacent to the pancreatic tail. Correlate with amylase and lipase levels. No pancreatic mass is identified. The adrenal glands are normal. The bilateral kidneys are unremarkable. The abdominal aorta is normal in caliber.    The colon appears slightly thick-walled throughout its course, however this may be related to incomplete distention. Slightly thick-walled appearance of ileal loops is also noted. No free air or free fluid is identified.    Images of the pelvis demonstrate a normal urinary bladder. There is no free fluid or lymphadenopathy. No acute osseous abnormalities are identified.    IMPRESSION:      1. Subtle fat stranding adjacent to the pancreatic tail suspicious for early or mild pancreatitis. Correlate with amylase and lipase levels.  2. Focal pneumomediastinum, with multiple air bubbles noted dorsal to the distal esophagus. The esophagus is slightly thick-walled which could be on the basis of esophagitis or incomplete distention. While esophageal perforation is felt unlikely, if this is a strong clinical concern, CT chest following water-soluble oral contrast administration could be performed.  3. Severe hepatic steatosis. Hyperdense appearance of the gallbladder is likely on the basis of hyperdense bile.  4. Slightly thick-walled appearance of the colon and of the ileum. Correlate clinically for enterocolitis. Liver disease/hypoalbuminemia could also give this same appearance, and wall thickening of the colon may be in part related to incomplete distention.  5. Additional findings as above.    Electronically signed by:  Naren Mendez MD  2/19/2023 2:46 PM [facility] Workstation: [id number]                                      CT Head Without Contrast (Final result)  Result time 02/19/23 14:31:41      Final result by Naren Mendez MD (02/19/23 14:31:41)                   Narrative:    CT HEAD WITHOUT CONTRAST    CMS MANDATED QUALITY DATA - CT RADIATION  436    All CT scans at this facility utilize dose modulation, iterative reconstruction, and/or weight based dosing when appropriate to reduce radiation dose to as low as reasonably achievable    Clinical data: Dizziness. Comparison to December 2018.    FINDINGS: Noninfusion images were obtained from the skull base to the vertex. There is no intracranial mass, hemorrhage, or midline shift. Ventricles and sulci are normal. There are no pathologic extra-axial fluid collections. There is no evidence of ischemic change or edema. Cerebellum and brainstem are normal.    The calvarium is intact.Mild mucoperiosteal thickening in the left maxillary sinus is consistent with chronic sinusitis, improved compared to the prior study.    IMPRESSION:    1. Normal CT appearance of the brain.  2. Mild chronic left maxillary sinusitis, improved compared to 2018.    Electronically signed by:  Naren Mendez MD  2/19/2023 2:31 PM CST Workstation: [id number]                                     X-Ray Hip 2 or 3 views Left (with Pelvis when performed) (Final result)  Result time 02/19/23 10:42:16      Final result by Naren Mendez MD (02/19/23 10:42:16)                   Narrative:    Left hip with AP pelvis    CLINICAL DATA: Trauma, pain    FINDINGS: 3 views are negative for fracture, dislocation, or osseous destructive lesion. No significant arthritic change is identified. Soft tissues are unremarkable.    IMPRESSION:  1. No radiographic abnormalities.    Electronically signed by:  Naren Mendez MD  2/19/2023 10:42 AM CST Workstation: [id number]                                     X-Ray Chest AP Portable (Final result)  Result time 02/19/23 10:41:34      Final result by Naren Mendez  MD (02/19/23 10:41:34)                   Narrative:    Chest single view    Clinical data:Chest pain. Comparison to January 2022.    FINDINGS: AP view of the chest demonstrates no cardiac, pulmonary, or osseous abnormalities.    IMPRESSION:  1. Normal chest single view.    Electronically signed by:  Naren Mendez MD  2/19/2023 10:41 AM [facility] Workstation: [id number]

## 2023-03-01 NOTE — RESPIRATORY THERAPY
02/28/23 1941   Patient Assessment/Suction   Level of Consciousness (AVPU) alert   Respiratory Effort Unlabored   Expansion/Accessory Muscles/Retractions no use of accessory muscles   All Lung Fields Breath Sounds clear   PRE-TX-O2   Device (Oxygen Therapy) room air   SpO2 100 %   Pulse Oximetry Type Intermittent   $ Pulse Oximetry - Multiple Charge Pulse Oximetry - Multiple   Pulse 80   Resp 18   Aerosol Therapy   $ Aerosol Therapy Charges Aerosol Treatment   Daily Review of Necessity (SVN) completed   Respiratory Treatment Status (SVN) given   Treatment Route (SVN) mask;oxygen   Patient Position (SVN) HOB elevated   Post Treatment Assessment (SVN) breath sounds unchanged   Signs of Intolerance (SVN) none   Breath Sounds Post-Respiratory Treatment   Throughout All Fields Post-Treatment no change   Post-treatment Heart Rate (beats/min) 72   Post-treatment Resp Rate (breaths/min) 18   Education   $ Education Bronchodilator;DME Nebulizer;DME Oxygen;15 min   Respiratory Evaluation   $ Care Plan Tech Time 15 min   $ Eval/Re-eval Charges Re-evaluation

## 2023-03-01 NOTE — PLAN OF CARE
03/01/23 0843   Discharge Reassessment   Assessment Type Discharge Planning Reassessment   Did the patient's condition or plan change since previous assessment? No   Discharge Plan discussed with: Patient   Communicated FLORINA with patient/caregiver Yes   Discharge Plan A Psychiatric hospital   Discharge Plan B Psychiatric hospital   DME Needed Upon Discharge  none   Discharge Barriers Identified Mental illness   Why the patient remains in the hospital Requires continued medical care   Post-Acute Status   Post-Acute Authorization Placement   Post-Acute Placement Status Referrals Sent   Discharge Delays (!) Post-Acute Set-up     Per Dr. Fox, patient medically clear for transfer to inpatient psych facility. Patient C.Diff positive 02/22/2023. CM sent secure chat to [facility] about psych placement - psych facilities will require full treatment for C. DIff(full treatment 10 days, 4 more days remaining) CM anticipating discharge to psych facility once treatment complete.

## 2023-03-01 NOTE — CARE UPDATE
03/01/23 0720   Patient Assessment/Suction   Level of Consciousness (AVPU) alert   Respiratory Effort Normal;Unlabored   Expansion/Accessory Muscles/Retractions no use of accessory muscles;no retractions;expansion symmetric   All Lung Fields Breath Sounds clear   Rhythm/Pattern, Respiratory unlabored;pattern regular;depth regular;chest wiggle adequate;no shortness of breath reported   Cough Frequency no cough   PRE-TX-O2   Device (Oxygen Therapy) room air   SpO2 98 %   Pulse Oximetry Type Intermittent   $ Pulse Oximetry - Multiple Charge Pulse Oximetry - Multiple   Pulse 68   Resp 18   Aerosol Therapy   $ Aerosol Therapy Charges Aerosol Treatment   Daily Review of Necessity (SVN) completed   Respiratory Treatment Status (SVN) given   Treatment Route (SVN) mask;oxygen   Patient Position (SVN) HOB elevated   Post Treatment Assessment (SVN) increased aeration   Signs of Intolerance (SVN) none   Breath Sounds Post-Respiratory Treatment   Throughout All Fields Post-Treatment All Fields   Throughout All Fields Post-Treatment aeration increased   Post-treatment Heart Rate (beats/min) 75   Post-treatment Resp Rate (breaths/min) 18   Education   $ Education Bronchodilator;15 min   Respiratory Evaluation   $ Care Plan Tech Time 15 min

## 2023-03-01 NOTE — PROGRESS NOTES
"Former[facility], [facility] Medicine  Progress Note    Patient Name: Elder Rosales  MRN: [id number]  Patient Class: IP- Inpatient   Admission Date: 2/19/2023  Length of Stay: 10 days  Attending Physician: Berenice Fox MD  Primary Care Provider: [facility]        Subjective:     Principal Problem:Suicidal ideation        HPI:  Patient is a 43 yo cauc male with hx ETOH, >20y/pack/d Reformed smoker, vertigo, ADHD, IVDU, Polysubstance abused,treated Hep c . He reports going camping and fishing 3 days ago and experienced vertigo, abdominal pain, N/V, falling and imbalance, hearing voices in his head and talking to himself. He report dark black stools for 4 days however hbg is stable. He states, " he stop use to smoke a pack/d, and drink 12-15 beers a day but stopped a month ago". While in the ED reported suicidal thoughts and depression and asked to speak with a psychiatrist. He was evaluated by telepsych. On assessment patient was fidgeting, difficulty focusing, not staying on track with questions asked and gaurded to left hip and back. He denied drug use however past documentation states iv drug user,and  polysubstance abuse. He denies chest pain, dysuria, fever, and chills.     Hospitalist asked to admit for hyponatermia , pancreatitis, enterocolitis, abd ct abnormality. Nephrology, GI consulted in ED.      Overview/Hospital Course:  Patient with a history of alcoholism, smoker, vertigo, history of IVDU/polysubstance abuse but denies recent, treated hepatitis-C.  States he went out fishing/camping, believes he had an attack of vertigo, states subsequent day he was generally weak, it took him 4 days to come out of the camp.  States he was having dark stool.  He presented to the ED, expressed suicidal ideation on background history of depression, he was seen by tele psych LEIF Adams, recommends Seroquel 100 q.h.s., Remeron 15 mg q.h.s. Admission labs with " hyponatremia 120 with severe hypokalemia, transaminitis with elevation of lipase.  He was admitted to the ICU, GI and Nephrology consultation, IV fluid hydration, continuous PPI therapy, esophagram.  On 02/22, esophagram with no evidence of esophageal perforation or tears, starting liquid diet and advancing as tolerated, once medically cleared will need inpatient psychiatric placement.  On 02/22 C diff is positive and started on oral vancomycin. He reports bilateral severe burning feet pain, he is on gabapentin 800 mg t.i.d. at home, states pain is worsening, ultrasound legs no DVT, MRI lumbosacral spine no evidence of nerve compromise, neurology consulted, discussed gabapentin, Lyrica, Cymbalta, patient is requesting IV narcotic medications despite counseling.  On 02/25 did have intermittent fevers up to 100.4, fever workup repeated including UA (negative), chest x-ray with possible lower lung changes and empirically started on Zosyn, blood culture no growth to date, rapid COVID negative, procalcitonin 0.78.  On 2/26 continues with irritability and requesting IV narcotics despite counseling on feet pain, seen and examined with RN and sitter in room, using profanity, transfer care to another hospitalist as discussed with patient as feels pain not being appropriately managed      Interval History: PEC's, diarrhea improving. Inpatient Psych facility can not accept until C. Diff treatment completed.     Review of Systems   Constitutional:  Positive for fever.   Respiratory:  Negative for cough.    Gastrointestinal:  Negative for nausea and vomiting.   Genitourinary:  Negative for difficulty urinating.   Psychiatric/Behavioral:  Positive for dysphoric mood.    Objective:     Vital Signs (Most Recent):  Temp: 98.4 °F (36.9 °C) (03/01/23 0837)  Pulse: 109 (03/01/23 0837)  Resp: 18 (03/01/23 0837)  BP: (!) 141/89 (03/01/23 0837)  SpO2: 98 % (03/01/23 0720)   Vital Signs (24h Range):  Temp:  [97 °F (36.1 °C)-99 °F (37.2  °C)] 98.4 °F (36.9 °C)  Pulse:  [] 109  Resp:  [16-18] 18  SpO2:  [97 %-100 %] 98 %  BP: (114-145)/(68-90) 141/89     Weight: 83.7 kg (184 lb 8.4 oz)  Body mass index is 25.03 kg/m².  No intake or output data in the 24 hours ending 03/01/23 0838   Physical Exam  Vitals and nursing note reviewed.   Constitutional:       Comments: Lying in bed, initially sleeping but did awaken   HENT:      Head: Normocephalic and atraumatic.      Mouth/Throat:      Mouth: Mucous membranes are moist.      Comments: No oral thrush  Cardiovascular:      Rate and Rhythm: Normal rate and regular rhythm.   Pulmonary:      Comments: On room air, no accessory muscle use, no wheeze or crackles  Abdominal:      General: Bowel sounds are normal.      Palpations: Abdomen is soft.      Tenderness: There is no abdominal tenderness.   Neurological:      Mental Status: He is oriented to person, place, and time.      Comments: On touching feet reporting pain   Psychiatric:      Comments: Irritable, using profanities       Significant Labs: All pertinent labs within the past 24 hours have been reviewed.  CBC: No results for input(s): WBC, HGB, HCT, PLT in the last 48 hours.  CMP: No results for input(s): NA, K, CL, CO2, GLU, BUN, CREATININE, CALCIUM, PROT, ALBUMIN, BILITOT, ALKPHOS, AST, ALT, ANIONGAP, EGFRNONAA in the last 48 hours.    Invalid input(s): EST[place]    Significant Imaging:   Imaging Results              US Abdomen Limited (Final result)  Result time 02/19/23 17:11:09      Final result by Naren Mendez MD (02/19/23 17:11:09)                   Narrative:    Abdominal ultrasound Limited    CLINICAL DATA: Hyperbilirubinemia    FINDINGS: Sonographic assessment targeted to the right upper quadrant was performed. The pancreas and midline retroperitoneum are obscured by bowel gas. Visualization of the liver is limited due to poor penetration. The liver is echogenic compatible with fatty infiltration. No mass is identified.  Hepatopedal flow is noted within the portal vein. The gallbladder is mildly distended. No gallstones are identified. Common bile duct measures 5 mm.    The right kidney is normal in appearance. No free fluid is identified.    IMPRESSION:  1. Exam is limited as noted above.  2. Fatty infiltration of the liver.  3. No evidence of cholecystitis or obstruction.    Electronically signed by:  Naren Mendez MD  2/19/2023 5:11 PM [facility] Workstation: [id number]                                     CT Chest Without Contrast (Final result)  Result time 02/19/23 16:23:48      Final result by Naren Mendez MD (02/19/23 16:23:48)                   Narrative:    CT chest without contrast    CLINICAL DATA: Pneumomediastinum    CMS MANDATED QUALITY DATA - CT RADIATION  436    All CT scans at this facility utilize dose modulation, iterative reconstruction, and/or weight based dosing when appropriate to reduce radiation dose to as low as reasonably achievable.    Findings: Thin section axial noncontrast images were obtained from the thoracic inlet to the adrenal glands, following the administration of water-soluble oral contrast.    Again noted are findings of pneumomediastinum, with multiple bubbles of mediastinal air adjacent to the esophagus and left main bronchus. There is no contrast extravasation to indicate esophageal perforation. No pleural effusions are demonstrated. There is mild circumferential wall thickening of the lower two thirds of the thoracic esophagus suggesting possible esophagitis. Correlate clinically.    Heart size is normal. The thoracic aorta is normal in caliber. No mediastinal lymphadenopathy is identified.    There are no pulmonary infiltrates or mass lesions. No pneumothorax is demonstrated.    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    IMPRESSION:  1. There is no identifiable extravasation of ingested water-soluble oral contrast material to indicate  esophageal perforation. There is no pathologic mediastinal fluid collection or pleural effusion. If there is persistent clinical suspicion of esophageal perforation, endoscopy should be considered.  2. Mild pneumomediastinum, with air primarily around the left main bronchus and in a paraesophageal distribution    Electronically signed by:  Naren Mendez MD  2/19/2023 4:23 PM [facility] Workstation: [id number]                                     CT Abdomen Pelvis With Contrast (Final result)  Result time 02/19/23 14:46:17      Final result by Naren Mendez MD (02/19/23 14:46:17)                   Narrative:    CT ABDOMEN AND PELVIS WITH CONTRAST    HISTORY: Abdominal pain    CMS MANDATED QUALITY DATA - CT RADIATION  436    All CT scans at this facility utilize dose modulation, iterative reconstruction, and/or weight based dosing when appropriate to reduce radiation dose to as low as reasonably achievable    FINDINGS:    Post infusion images were obtained from the lung bases to the pubic symphysis. 100 cc nonionic contrast was administered for the examination.    The lung bases are unremarkable. Focal pneumomediastinum is noted, with multiple air bubbles noted posterior to the esophagus. There is mild circumferential wall thickening of the esophagus distally which could be on the basis of underdistention or esophagitis.    There is marked hepatic steatosis. The gallbladder is mildly dilated. Gallbladder lumen is hyperdense in appearance, likely on the basis of hyperdense bile. Biliary tree is nondilated. The spleen has a normal appearance. There is subtle fat stranding adjacent to the pancreatic tail. Correlate with amylase and lipase levels. No pancreatic mass is identified. The adrenal glands are normal. The bilateral kidneys are unremarkable. The abdominal aorta is normal in caliber.    The colon appears slightly thick-walled throughout its course, however this may be related to incomplete distention. Slightly  thick-walled appearance of ileal loops is also noted. No free air or free fluid is identified.    Images of the pelvis demonstrate a normal urinary bladder. There is no free fluid or lymphadenopathy. No acute osseous abnormalities are identified.    IMPRESSION:      1. Subtle fat stranding adjacent to the pancreatic tail suspicious for early or mild pancreatitis. Correlate with amylase and lipase levels.  2. Focal pneumomediastinum, with multiple air bubbles noted dorsal to the distal esophagus. The esophagus is slightly thick-walled which could be on the basis of esophagitis or incomplete distention. While esophageal perforation is felt unlikely, if this is a strong clinical concern, CT chest following water-soluble oral contrast administration could be performed.  3. Severe hepatic steatosis. Hyperdense appearance of the gallbladder is likely on the basis of hyperdense bile.  4. Slightly thick-walled appearance of the colon and of the ileum. Correlate clinically for enterocolitis. Liver disease/hypoalbuminemia could also give this same appearance, and wall thickening of the colon may be in part related to incomplete distention.  5. Additional findings as above.    Electronically signed by:  Naren Mendez MD  2/19/2023 2:46 PM [facility] Workstation: [id number]                                     CT Head Without Contrast (Final result)  Result time 02/19/23 14:31:41      Final result by Naren Mendez MD (02/19/23 14:31:41)                   Narrative:    CT HEAD WITHOUT CONTRAST    CMS MANDATED QUALITY DATA - CT RADIATION  436    All CT scans at this facility utilize dose modulation, iterative reconstruction, and/or weight based dosing when appropriate to reduce radiation dose to as low as reasonably achievable    Clinical data: Dizziness. Comparison to December 2018.    FINDINGS: Noninfusion images were obtained from the skull base to the vertex. There is no intracranial mass, hemorrhage, or midline shift.  Ventricles and sulci are normal. There are no pathologic extra-axial fluid collections. There is no evidence of ischemic change or edema. Cerebellum and brainstem are normal.    The calvarium is intact.Mild mucoperiosteal thickening in the left maxillary sinus is consistent with chronic sinusitis, improved compared to the prior study.    IMPRESSION:    1. Normal CT appearance of the brain.  2. Mild chronic left maxillary sinusitis, improved compared to 2018.    Electronically signed by:  Naren Mendez MD  2/19/2023 2:31 PM [facility] Workstation: [id number]                                     X-Ray Hip 2 or 3 views Left (with Pelvis when performed) (Final result)  Result time 02/19/23 10:42:16      Final result by Naren Mendez MD (02/19/23 10:42:16)                   Narrative:    Left hip with AP pelvis    CLINICAL DATA: Trauma, pain    FINDINGS: 3 views are negative for fracture, dislocation, or osseous destructive lesion. No significant arthritic change is identified. Soft tissues are unremarkable.    IMPRESSION:  1. No radiographic abnormalities.    Electronically signed by:  Naren Mendez MD  2/19/2023 10:42 AM [facility] Workstation: [id number]                                     X-Ray Chest AP Portable (Final result)  Result time 02/19/23 10:41:34      Final result by Naren Mendez MD (02/19/23 10:41:34)                   Narrative:    Chest single view    Clinical data:Chest pain. Comparison to January 2022.    FINDINGS: AP view of the chest demonstrates no cardiac, pulmonary, or osseous abnormalities.    IMPRESSION:  1. Normal chest single view.    Electronically signed by:  Naren Mendez MD  2/19/2023 10:41 AM [facility] Workstation: [id number]                                        Assessment/Plan:      * Suicidal ideation  Suicide precautions.  ICU  Psych consult  Will need placement  PEC         Pain in both feet  Supportive care.       Folate deficiency  Continue Folic acid  supplementations      Clostridium difficile infection  Contact isolation.   On PO Vancomycin 125 mg q 6 hrs Day # 6.       Anemia  Follow CBC      Depression  PEC'd, awaiting inpatient Psych placement. Continue seroquel.        Hepatitis C virus infection cured after antiviral drug therapy  Noted, trend LFTs.       Nicotine abuse  Smoking cessation counseling performed. Dangers of cigarette smoking were reviewed with patient in detail and patient was encouraged to quit. Nicotine replacement options were discussed for > 10 minutes.        Polysubstance dependence including opioid type drug, episodic abuse, with delirium  Noted.        VTE Risk Mitigation (From admission, onward)         Ordered     enoxaparin injection 40 mg  Every 24 hours (non-standard times)         02/21/23 1151     IP VTE LOW RISK PATIENT  Once         02/19/23 1822                Discharge Planning   FLORINA: 3/6/2023     Code Status: Full Code   Is the patient medically ready for discharge?:     Reason for patient still in hospital (select all that apply): Patient trending condition and Consult recommendations  Discharge Plan A: Psychiatric hospital   Discharge Delays: (!) Post-Acute Set-up              Berenice Fox MD  Department of Hospital Medicine   [facility]

## 2023-03-02 LAB
BACTERIA BLD CULT: NORMAL
METHLYMALONIC ACID: 210 NMOL/L (ref 0–378)
VIT B6 SERPL-MCNC: 4.5 UG/L (ref 3.4–65.2)

## 2023-03-02 PROCEDURE — 99900035 HC TECH TIME PER 15 MIN (STAT)

## 2023-03-02 PROCEDURE — 63600175 PHARM REV CODE 636 W HCPCS: Performed by: INTERNAL MEDICINE

## 2023-03-02 PROCEDURE — 12000002 HC ACUTE/MED SURGE SEMI-PRIVATE ROOM

## 2023-03-02 PROCEDURE — 25000242 PHARM REV CODE 250 ALT 637 W/ HCPCS: Performed by: INTERNAL MEDICINE

## 2023-03-02 PROCEDURE — 25000003 PHARM REV CODE 250: Performed by: INTERNAL MEDICINE

## 2023-03-02 PROCEDURE — 94761 N-INVAS EAR/PLS OXIMETRY MLT: CPT

## 2023-03-02 PROCEDURE — 99900031 HC PATIENT EDUCATION (STAT)

## 2023-03-02 PROCEDURE — 94640 AIRWAY INHALATION TREATMENT: CPT

## 2023-03-02 PROCEDURE — 25000003 PHARM REV CODE 250: Performed by: NURSE PRACTITIONER

## 2023-03-02 RX ADMIN — TRAMADOL HYDROCHLORIDE 50 MG: 50 TABLET, COATED ORAL at 05:03

## 2023-03-02 RX ADMIN — CLONAZEPAM 1 MG: 1 TABLET ORAL at 02:03

## 2023-03-02 RX ADMIN — QUETIAPINE 100 MG: 100 TABLET ORAL at 09:03

## 2023-03-02 RX ADMIN — VANCOMYCIN HYDROCHLORIDE 125 MG: KIT at 09:03

## 2023-03-02 RX ADMIN — ENOXAPARIN SODIUM 40 MG: 100 INJECTION SUBCUTANEOUS at 09:03

## 2023-03-02 RX ADMIN — TRAMADOL HYDROCHLORIDE 50 MG: 50 TABLET, COATED ORAL at 09:03

## 2023-03-02 RX ADMIN — BUDESONIDE INHALATION 0.5 MG: 0.5 SUSPENSION RESPIRATORY (INHALATION) at 07:03

## 2023-03-02 RX ADMIN — PANTOPRAZOLE SODIUM 40 MG: 40 TABLET, DELAYED RELEASE ORAL at 05:03

## 2023-03-02 RX ADMIN — CLONAZEPAM 1 MG: 1 TABLET ORAL at 09:03

## 2023-03-02 RX ADMIN — GABAPENTIN 800 MG: 100 CAPSULE ORAL at 09:03

## 2023-03-02 RX ADMIN — LEVOFLOXACIN 500 MG: 250 TABLET, FILM COATED ORAL at 05:03

## 2023-03-02 RX ADMIN — VANCOMYCIN HYDROCHLORIDE 125 MG: KIT at 08:03

## 2023-03-02 RX ADMIN — GABAPENTIN 800 MG: 100 CAPSULE ORAL at 04:03

## 2023-03-02 RX ADMIN — VANCOMYCIN HYDROCHLORIDE 125 MG: KIT at 02:03

## 2023-03-02 RX ADMIN — ARFORMOTEROL TARTRATE 15 MCG: 15 SOLUTION RESPIRATORY (INHALATION) at 07:03

## 2023-03-02 RX ADMIN — VANCOMYCIN HYDROCHLORIDE 125 MG: KIT at 03:03

## 2023-03-02 RX ADMIN — FOLIC ACID 1 MG: 1 TABLET ORAL at 09:03

## 2023-03-02 NOTE — SUBJECTIVE & OBJECTIVE
Interval History: PEC'D, diarrhea much better. Inpatient Psych facility can not accept until C. Diff treatment completed.     Review of Systems   Constitutional:  Positive for fever.   Respiratory:  Negative for cough.    Gastrointestinal:  Negative for nausea and vomiting.   Genitourinary:  Negative for difficulty urinating.   Psychiatric/Behavioral:  Positive for dysphoric mood.    Objective:     Vital Signs (Most Recent):  Temp: 99 °F (37.2 °C) (03/02/23 0300)  Pulse: 93 (03/02/23 0300)  Resp: 16 (03/02/23 0300)  BP: 119/74 (03/02/23 0300)  SpO2: 98 % (03/02/23 0300)   Vital Signs (24h Range):  Temp:  [97 °F (36.1 °C)-99 °F (37.2 °C)] 99 °F (37.2 °C)  Pulse:  [] 93  Resp:  [15-20] 16  SpO2:  [96 %-98 %] 98 %  BP: (114-150)/(74-95) 119/74     Weight: 83.7 kg (184 lb 8.4 oz)  Body mass index is 25.03 kg/m².    Intake/Output Summary (Last 24 hours) at 3/2/2023 0715  Last data filed at 3/1/2023 1150  Gross per 24 hour   Intake 1760 ml   Output 5 ml   Net 1755 ml        Physical Exam  Vitals and nursing note reviewed.   Constitutional:       Comments: Lying in bed, initially sleeping but did awaken   HENT:      Head: Normocephalic and atraumatic.      Mouth/Throat:      Mouth: Mucous membranes are moist.      Comments: No oral thrush  Cardiovascular:      Rate and Rhythm: Normal rate and regular rhythm.   Pulmonary:      Comments: On room air, no accessory muscle use, no wheeze or crackles  Abdominal:      General: Bowel sounds are normal.      Palpations: Abdomen is soft.      Tenderness: There is no abdominal tenderness.   Neurological:      Mental Status: He is oriented to person, place, and time.      Comments: On touching feet reporting pain   Psychiatric:      Comments: Irritable, using profanities       Significant Labs: All pertinent labs within the past 24 hours have been reviewed.  CBC: No results for input(s): WBC, HGB, HCT, PLT in the last 48 hours.  CMP: No results for input(s): NA, K, CL, CO2, GLU,  BUN, CREATININE, CALCIUM, PROT, ALBUMIN, BILITOT, ALKPHOS, AST, ALT, ANIONGAP, EGFRNONAA in the last 48 hours.    Invalid input(s): EST[place]    Significant Imaging:   Imaging Results              US Abdomen Limited (Final result)  Result time 02/19/23 17:11:09      Final result by Naren Mendez MD (02/19/23 17:11:09)                   Narrative:    Abdominal ultrasound Limited    CLINICAL DATA: Hyperbilirubinemia    FINDINGS: Sonographic assessment targeted to the right upper quadrant was performed. The pancreas and midline retroperitoneum are obscured by bowel gas. Visualization of the liver is limited due to poor penetration. The liver is echogenic compatible with fatty infiltration. No mass is identified. Hepatopedal flow is noted within the portal vein. The gallbladder is mildly distended. No gallstones are identified. Common bile duct measures 5 mm.    The right kidney is normal in appearance. No free fluid is identified.    IMPRESSION:  1. Exam is limited as noted above.  2. Fatty infiltration of the liver.  3. No evidence of cholecystitis or obstruction.    Electronically signed by:  Naren Mendez MD  2/19/2023 5:11 PM CST Workstation: [id number]                                     CT Chest Without Contrast (Final result)  Result time 02/19/23 16:23:48      Final result by Naren Mendez MD (02/19/23 16:23:48)                   Narrative:    CT chest without contrast    CLINICAL DATA: Pneumomediastinum    CMS MANDATED QUALITY DATA - CT RADIATION  436    All CT scans at this facility utilize dose modulation, iterative reconstruction, and/or weight based dosing when appropriate to reduce radiation dose to as low as reasonably achievable.    Findings: Thin section axial noncontrast images were obtained from the thoracic inlet to the adrenal glands, following the administration of water-soluble oral contrast.    Again noted are findings of pneumomediastinum, with multiple bubbles of mediastinal  air adjacent to the esophagus and left main bronchus. There is no contrast extravasation to indicate esophageal perforation. No pleural effusions are demonstrated. There is mild circumferential wall thickening of the lower two thirds of the thoracic esophagus suggesting possible esophagitis. Correlate clinically.    Heart size is normal. The thoracic aorta is normal in caliber. No mediastinal lymphadenopathy is identified.    There are no pulmonary infiltrates or mass lesions. No pneumothorax is demonstrated.    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    IMPRESSION:  1. There is no identifiable extravasation of ingested water-soluble oral contrast material to indicate esophageal perforation. There is no pathologic mediastinal fluid collection or pleural effusion. If there is persistent clinical suspicion of esophageal perforation, endoscopy should be considered.  2. Mild pneumomediastinum, with air primarily around the left main bronchus and in a paraesophageal distribution    Electronically signed by:  Naren Mendez MD  2/19/2023 4:23 PM CST Workstation: [id number]                                     CT Abdomen Pelvis With Contrast (Final result)  Result time 02/19/23 14:46:17      Final result by Naren Mendez MD (02/19/23 14:46:17)                   Narrative:    CT ABDOMEN AND PELVIS WITH CONTRAST    HISTORY: Abdominal pain    CMS MANDATED QUALITY DATA - CT RADIATION  436    All CT scans at this facility utilize dose modulation, iterative reconstruction, and/or weight based dosing when appropriate to reduce radiation dose to as low as reasonably achievable    FINDINGS:    Post infusion images were obtained from the lung bases to the pubic symphysis. 100 cc nonionic contrast was administered for the examination.    The lung bases are unremarkable. Focal pneumomediastinum is noted, with multiple air bubbles noted posterior to the esophagus. There is mild  circumferential wall thickening of the esophagus distally which could be on the basis of underdistention or esophagitis.    There is marked hepatic steatosis. The gallbladder is mildly dilated. Gallbladder lumen is hyperdense in appearance, likely on the basis of hyperdense bile. Biliary tree is nondilated. The spleen has a normal appearance. There is subtle fat stranding adjacent to the pancreatic tail. Correlate with amylase and lipase levels. No pancreatic mass is identified. The adrenal glands are normal. The bilateral kidneys are unremarkable. The abdominal aorta is normal in caliber.    The colon appears slightly thick-walled throughout its course, however this may be related to incomplete distention. Slightly thick-walled appearance of ileal loops is also noted. No free air or free fluid is identified.    Images of the pelvis demonstrate a normal urinary bladder. There is no free fluid or lymphadenopathy. No acute osseous abnormalities are identified.    IMPRESSION:      1. Subtle fat stranding adjacent to the pancreatic tail suspicious for early or mild pancreatitis. Correlate with amylase and lipase levels.  2. Focal pneumomediastinum, with multiple air bubbles noted dorsal to the distal esophagus. The esophagus is slightly thick-walled which could be on the basis of esophagitis or incomplete distention. While esophageal perforation is felt unlikely, if this is a strong clinical concern, CT chest following water-soluble oral contrast administration could be performed.  3. Severe hepatic steatosis. Hyperdense appearance of the gallbladder is likely on the basis of hyperdense bile.  4. Slightly thick-walled appearance of the colon and of the ileum. Correlate clinically for enterocolitis. Liver disease/hypoalbuminemia could also give this same appearance, and wall thickening of the colon may be in part related to incomplete distention.  5. Additional findings as above.    Electronically signed by:  Naren  Vanessa FIGUEROA  2/19/2023 2:46 PM CST Workstation: [id number]                                     CT Head Without Contrast (Final result)  Result time 02/19/23 14:31:41      Final result by Naren Mendez MD (02/19/23 14:31:41)                   Narrative:    CT HEAD WITHOUT CONTRAST    CMS MANDATED QUALITY DATA - CT RADIATION  436    All CT scans at this facility utilize dose modulation, iterative reconstruction, and/or weight based dosing when appropriate to reduce radiation dose to as low as reasonably achievable    Clinical data: Dizziness. Comparison to December 2018.    FINDINGS: Noninfusion images were obtained from the skull base to the vertex. There is no intracranial mass, hemorrhage, or midline shift. Ventricles and sulci are normal. There are no pathologic extra-axial fluid collections. There is no evidence of ischemic change or edema. Cerebellum and brainstem are normal.    The calvarium is intact.Mild mucoperiosteal thickening in the left maxillary sinus is consistent with chronic sinusitis, improved compared to the prior study.    IMPRESSION:    1. Normal CT appearance of the brain.  2. Mild chronic left maxillary sinusitis, improved compared to 2018.    Electronically signed by:  Naren Mendez MD  2/19/2023 2:31 PM CST Workstation: [id number]                                     X-Ray Hip 2 or 3 views Left (with Pelvis when performed) (Final result)  Result time 02/19/23 10:42:16      Final result by Naren Mendez MD (02/19/23 10:42:16)                   Narrative:    Left hip with AP pelvis    CLINICAL DATA: Trauma, pain    FINDINGS: 3 views are negative for fracture, dislocation, or osseous destructive lesion. No significant arthritic change is identified. Soft tissues are unremarkable.    IMPRESSION:  1. No radiographic abnormalities.    Electronically signed by:  Naren Mendez MD  2/19/2023 10:42 AM CST Workstation: [id number]                                     X-Ray Chest AP  Portable (Final result)  Result time 02/19/23 10:41:34      Final result by Naren Mendez MD (02/19/23 10:41:34)                   Narrative:    Chest single view    Clinical data:Chest pain. Comparison to January 2022.    FINDINGS: AP view of the chest demonstrates no cardiac, pulmonary, or osseous abnormalities.    IMPRESSION:  1. Normal chest single view.    Electronically signed by:  Naren Mendez MD  2/19/2023 10:41 AM [facility] Workstation: [id number]

## 2023-03-02 NOTE — CARE UPDATE
03/01/23 2102   Patient Assessment/Suction   Level of Consciousness (AVPU) alert   Respiratory Effort Unlabored   Expansion/Accessory Muscles/Retractions no use of accessory muscles   All Lung Fields Breath Sounds clear   Rhythm/Pattern, Respiratory no shortness of breath reported   Cough Frequency no cough   PRE-TX-O2   Device (Oxygen Therapy) room air   SpO2 96 %   Pulse Oximetry Type Intermittent   $ Pulse Oximetry - Multiple Charge Pulse Oximetry - Multiple   Pulse 75   Resp 15   Positioning   Head of Bed (HOB) Positioning HOB at 30 degrees   Education   $ Education Bronchodilator;15 min   Respiratory Evaluation   $ Care Plan Tech Time 15 min   $ Eval/Re-eval Charges Re-evaluation        03/01/23 2102   Patient Assessment/Suction   Level of Consciousness (AVPU) alert   Respiratory Effort Unlabored   Expansion/Accessory Muscles/Retractions no use of accessory muscles   All Lung Fields Breath Sounds clear   Rhythm/Pattern, Respiratory no shortness of breath reported   Cough Frequency no cough   PRE-TX-O2   Device (Oxygen Therapy) room air   SpO2 96 %   Pulse Oximetry Type Intermittent   $ Pulse Oximetry - Multiple Charge Pulse Oximetry - Multiple   Pulse 75   Resp 15   Positioning   Head of Bed (HOB) Positioning HOB at 30 degrees   Education   $ Education Bronchodilator;15 min   Respiratory Evaluation   $ Care Plan Tech Time 15 min   $ Eval/Re-eval Charges Re-evaluation

## 2023-03-02 NOTE — CARE UPDATE
03/02/23 0727   Patient Assessment/Suction   Level of Consciousness (AVPU) alert   Respiratory Effort Normal;Unlabored   Expansion/Accessory Muscles/Retractions no use of accessory muscles;no retractions;expansion symmetric   All Lung Fields Breath Sounds clear   Rhythm/Pattern, Respiratory unlabored;pattern regular;depth regular;chest wiggle adequate;no shortness of breath reported   Cough Frequency no cough   PRE-TX-O2   Device (Oxygen Therapy) room air   SpO2 100 %   Pulse Oximetry Type Intermittent   $ Pulse Oximetry - Multiple Charge Pulse Oximetry - Multiple   Pulse 81   Resp 18   Aerosol Therapy   $ Aerosol Therapy Charges Aerosol Treatment   Daily Review of Necessity (SVN) completed   Respiratory Treatment Status (SVN) given   Treatment Route (SVN) mask;oxygen   Patient Position (SVN) HOB elevated   Post Treatment Assessment (SVN) increased aeration   Signs of Intolerance (SVN) none   Breath Sounds Post-Respiratory Treatment   Throughout All Fields Post-Treatment All Fields   Throughout All Fields Post-Treatment aeration increased   Post-treatment Heart Rate (beats/min) 85   Post-treatment Resp Rate (breaths/min) 18   Education   $ Education Bronchodilator;15 min   Respiratory Evaluation   $ Care Plan Tech Time 15 min

## 2023-03-02 NOTE — PLAN OF CARE
03/02/23 0901   Discharge Reassessment   Assessment Type Discharge Planning Reassessment   Did the patient's condition or plan change since previous assessment? No   Discharge Plan discussed with: Patient   Discharge Barriers Identified Mental illness   Why the patient remains in the hospital Placement issues   Post-Acute Status   Post-Acute Authorization Placement   Discharge Delays (!) Post-Acute Set-up     Inpatient Psych facility can not accept until C. Diff treatment completed. Case management anticipating patient to be medically cleared on Saturday, March 4th

## 2023-03-02 NOTE — PROGRESS NOTES
"[facility] Medicine  Progress Note    Patient Name: Elder Rosales  MRN: [id number]  Patient Class: IP- Inpatient   Admission Date: 2/19/2023  Length of Stay: 11 days  Attending Physician: Berenice Fox MD  Primary Care Provider: [facility]        Subjective:     Principal Problem:Suicidal ideation        HPI:  Patient is a 43 yo cauc male with hx ETOH, >20y/pack/d Reformed smoker, vertigo, ADHD, IVDU, Polysubstance abused,treated Hep c . He reports going camping and fishing 3 days ago and experienced vertigo, abdominal pain, N/V, falling and imbalance, hearing voices in his head and talking to himself. He report dark black stools for 4 days however hbg is stable. He states, " he stop use to smoke a pack/d, and drink 12-15 beers a day but stopped a month ago". While in the ED reported suicidal thoughts and depression and asked to speak with a psychiatrist. He was evaluated by telepsych. On assessment patient was fidgeting, difficulty focusing, not staying on track with questions asked and gaurded to left hip and back. He denied drug use however past documentation states iv drug user,and  polysubstance abuse. He denies chest pain, dysuria, fever, and chills.     Hospitalist asked to admit for hyponatermia , pancreatitis, enterocolitis, abd ct abnormality. Nephrology, GI consulted in ED.      Overview/Hospital Course:  Patient with a history of alcoholism, smoker, vertigo, history of IVDU/polysubstance abuse but denies recent, treated hepatitis-C.  States he went out fishing/camping, believes he had an attack of vertigo, states subsequent day he was generally weak, it took him 4 days to come out of the camp.  States he was having dark stool.  He presented to the ED, expressed suicidal ideation on background history of depression, he was seen by tele psych LEIF Adams, recommends Seroquel 100 q.h.s., Remeron 15 mg q.h.s. Admission labs with " hyponatremia 120 with severe hypokalemia, transaminitis with elevation of lipase.  He was admitted to the ICU, GI and Nephrology consultation, IV fluid hydration, continuous PPI therapy, esophagram.  On 02/22, esophagram with no evidence of esophageal perforation or tears, starting liquid diet and advancing as tolerated, once medically cleared will need inpatient psychiatric placement.  On 02/22 C diff is positive and started on oral vancomycin. He reports bilateral severe burning feet pain, he is on gabapentin 800 mg t.i.d. at home, states pain is worsening, ultrasound legs no DVT, MRI lumbosacral spine no evidence of nerve compromise, neurology consulted, discussed gabapentin, Lyrica, Cymbalta, patient is requesting IV narcotic medications despite counseling.  On 02/25 did have intermittent fevers up to 100.4, fever workup repeated including UA (negative), chest x-ray with possible lower lung changes and empirically started on Zosyn, blood culture no growth to date, rapid COVID negative, procalcitonin 0.78.  On 2/26 continues with irritability and requesting IV narcotics despite counseling on feet pain, seen and examined with RN and sitter in room, using profanity, transfer care to another hospitalist as discussed with patient as feels pain not being appropriately managed      Interval History: PEC'D, diarrhea much better. Inpatient Psych facility can not accept until C. Diff treatment completed.  Patient asking for more pain medication for bilateral feet neuropathy symptoms.    Review of Systems   Constitutional:  Positive for fever.   Respiratory:  Negative for cough.    Gastrointestinal:  Negative for nausea and vomiting.   Genitourinary:  Negative for difficulty urinating.   Psychiatric/Behavioral:  Positive for dysphoric mood.    Objective:     Vital Signs (Most Recent):  Temp: 99 °F (37.2 °C) (03/02/23 0300)  Pulse: 93 (03/02/23 0300)  Resp: 16 (03/02/23 0300)  BP: 119/74 (03/02/23 0300)  SpO2: 98 %  (03/02/23 0300)   Vital Signs (24h Range):  Temp:  [97 °F (36.1 °C)-99 °F (37.2 °C)] 99 °F (37.2 °C)  Pulse:  [] 93  Resp:  [15-20] 16  SpO2:  [96 %-98 %] 98 %  BP: (114-150)/(74-95) 119/74     Weight: 83.7 kg (184 lb 8.4 oz)  Body mass index is 25.03 kg/m².    Intake/Output Summary (Last 24 hours) at 3/2/2023 0715  Last data filed at 3/1/2023 1150  Gross per 24 hour   Intake 1760 ml   Output 5 ml   Net 1755 ml        Physical Exam  Vitals and nursing note reviewed.   Constitutional:       Comments: Lying in bed, initially sleeping but did awaken   HENT:      Head: Normocephalic and atraumatic.      Mouth/Throat:      Mouth: Mucous membranes are moist.      Comments: No oral thrush  Cardiovascular:      Rate and Rhythm: Normal rate and regular rhythm.   Pulmonary:      Comments: On room air, no accessory muscle use, no wheeze or crackles  Abdominal:      General: Bowel sounds are normal.      Palpations: Abdomen is soft.      Tenderness: There is no abdominal tenderness.   Neurological:      Mental Status: He is oriented to person, place, and time.      Comments: On touching feet reporting pain   Psychiatric:      Comments: Irritable, using profanities       Significant Labs: All pertinent labs within the past 24 hours have been reviewed.  CBC: No results for input(s): WBC, HGB, HCT, PLT in the last 48 hours.  CMP: No results for input(s): NA, K, CL, CO2, GLU, BUN, CREATININE, CALCIUM, PROT, ALBUMIN, BILITOT, ALKPHOS, AST, ALT, ANIONGAP, EGFRNONAA in the last 48 hours.    Invalid input(s): ESTGF[place]    Significant Imaging:   Imaging Results              US Abdomen Limited (Final result)  Result time 02/19/23 17:11:09      Final result by Naren Mendez MD (02/19/23 17:11:09)                   Narrative:    Abdominal ultrasound Limited    CLINICAL DATA: Hyperbilirubinemia    FINDINGS: Sonographic assessment targeted to the right upper quadrant was performed. The pancreas and midline retroperitoneum are  obscured by bowel gas. Visualization of the liver is limited due to poor penetration. The liver is echogenic compatible with fatty infiltration. No mass is identified. Hepatopedal flow is noted within the portal vein. The gallbladder is mildly distended. No gallstones are identified. Common bile duct measures 5 mm.    The right kidney is normal in appearance. No free fluid is identified.    IMPRESSION:  1. Exam is limited as noted above.  2. Fatty infiltration of the liver.  3. No evidence of cholecystitis or obstruction.    Electronically signed by:  Naren Mendez MD  2/19/2023 5:11 PM [facility] Workstation: [id number]                                     CT Chest Without Contrast (Final result)  Result time 02/19/23 16:23:48      Final result by Naren Mendez MD (02/19/23 16:23:48)                   Narrative:    CT chest without contrast    CLINICAL DATA: Pneumomediastinum    CMS MANDATED QUALITY DATA - CT RADIATION  436    All CT scans at this facility utilize dose modulation, iterative reconstruction, and/or weight based dosing when appropriate to reduce radiation dose to as low as reasonably achievable.    Findings: Thin section axial noncontrast images were obtained from the thoracic inlet to the adrenal glands, following the administration of water-soluble oral contrast.    Again noted are findings of pneumomediastinum, with multiple bubbles of mediastinal air adjacent to the esophagus and left main bronchus. There is no contrast extravasation to indicate esophageal perforation. No pleural effusions are demonstrated. There is mild circumferential wall thickening of the lower two thirds of the thoracic esophagus suggesting possible esophagitis. Correlate clinically.    Heart size is normal. The thoracic aorta is normal in caliber. No mediastinal lymphadenopathy is identified.    There are no pulmonary infiltrates or mass lesions. No pneumothorax is demonstrated.    Chronic ununited fractures of the  posterior right ninth and 10th ribs are noted.    IMPRESSION:  1. There is no identifiable extravasation of ingested water-soluble oral contrast material to indicate esophageal perforation. There is no pathologic mediastinal fluid collection or pleural effusion. If there is persistent clinical suspicion of esophageal perforation, endoscopy should be considered.  2. Mild pneumomediastinum, with air primarily around the left main bronchus and in a paraesophageal distribution    Electronically signed by:  Naren Mendez MD  2/19/2023 4:23 PM [facility] Workstation: [id number]                                     CT Abdomen Pelvis With Contrast (Final result)  Result time 02/19/23 14:46:17      Final result by Naren Mendez MD (02/19/23 14:46:17)                   Narrative:    CT ABDOMEN AND PELVIS WITH CONTRAST    HISTORY: Abdominal pain    CMS MANDATED QUALITY DATA - CT RADIATION  436    All CT scans at this facility utilize dose modulation, iterative reconstruction, and/or weight based dosing when appropriate to reduce radiation dose to as low as reasonably achievable    FINDINGS:    Post infusion images were obtained from the lung bases to the pubic symphysis. 100 cc nonionic contrast was administered for the examination.    The lung bases are unremarkable. Focal pneumomediastinum is noted, with multiple air bubbles noted posterior to the esophagus. There is mild circumferential wall thickening of the esophagus distally which could be on the basis of underdistention or esophagitis.    There is marked hepatic steatosis. The gallbladder is mildly dilated. Gallbladder lumen is hyperdense in appearance, likely on the basis of hyperdense bile. Biliary tree is nondilated. The spleen has a normal appearance. There is subtle fat stranding adjacent to the pancreatic tail. Correlate with amylase and lipase levels. No pancreatic mass is identified. The adrenal glands are normal. The bilateral kidneys are unremarkable. The  abdominal aorta is normal in caliber.    The colon appears slightly thick-walled throughout its course, however this may be related to incomplete distention. Slightly thick-walled appearance of ileal loops is also noted. No free air or free fluid is identified.    Images of the pelvis demonstrate a normal urinary bladder. There is no free fluid or lymphadenopathy. No acute osseous abnormalities are identified.    IMPRESSION:      1. Subtle fat stranding adjacent to the pancreatic tail suspicious for early or mild pancreatitis. Correlate with amylase and lipase levels.  2. Focal pneumomediastinum, with multiple air bubbles noted dorsal to the distal esophagus. The esophagus is slightly thick-walled which could be on the basis of esophagitis or incomplete distention. While esophageal perforation is felt unlikely, if this is a strong clinical concern, CT chest following water-soluble oral contrast administration could be performed.  3. Severe hepatic steatosis. Hyperdense appearance of the gallbladder is likely on the basis of hyperdense bile.  4. Slightly thick-walled appearance of the colon and of the ileum. Correlate clinically for enterocolitis. Liver disease/hypoalbuminemia could also give this same appearance, and wall thickening of the colon may be in part related to incomplete distention.  5. Additional findings as above.    Electronically signed by:  Naren Mendez MD  2/19/2023 2:46 PM [facility] Workstation: [id number]                                     CT Head Without Contrast (Final result)  Result time 02/19/23 14:31:41      Final result by Naren Mendez MD (02/19/23 14:31:41)                   Narrative:    CT HEAD WITHOUT CONTRAST    CMS MANDATED QUALITY DATA - CT RADIATION  436    All CT scans at this facility utilize dose modulation, iterative reconstruction, and/or weight based dosing when appropriate to reduce radiation dose to as low as reasonably achievable    Clinical data: Dizziness.  Comparison to December 2018.    FINDINGS: Noninfusion images were obtained from the skull base to the vertex. There is no intracranial mass, hemorrhage, or midline shift. Ventricles and sulci are normal. There are no pathologic extra-axial fluid collections. There is no evidence of ischemic change or edema. Cerebellum and brainstem are normal.    The calvarium is intact.Mild mucoperiosteal thickening in the left maxillary sinus is consistent with chronic sinusitis, improved compared to the prior study.    IMPRESSION:    1. Normal CT appearance of the brain.  2. Mild chronic left maxillary sinusitis, improved compared to 2018.    Electronically signed by:  Naren Mendez MD  2/19/2023 2:31 PM [facility] Workstation: [id number]                                     X-Ray Hip 2 or 3 views Left (with Pelvis when performed) (Final result)  Result time 02/19/23 10:42:16      Final result by Naren Mendez MD (02/19/23 10:42:16)                   Narrative:    Left hip with AP pelvis    CLINICAL DATA: Trauma, pain    FINDINGS: 3 views are negative for fracture, dislocation, or osseous destructive lesion. No significant arthritic change is identified. Soft tissues are unremarkable.    IMPRESSION:  1. No radiographic abnormalities.    Electronically signed by:  Naren Mendez MD  2/19/2023 10:42 AM [facility] Workstation: [id number]                                     X-Ray Chest AP Portable (Final result)  Result time 02/19/23 10:41:34      Final result by Naren Mendez MD (02/19/23 10:41:34)                   Narrative:    Chest single view    Clinical data:Chest pain. Comparison to January 2022.    FINDINGS: AP view of the chest demonstrates no cardiac, pulmonary, or osseous abnormalities.    IMPRESSION:  1. Normal chest single view.    Electronically signed by:  Naren Mendez MD  2/19/2023 10:41 AM [facility] Workstation: [id number]                                        Assessment/Plan:      * Suicidal ideation  Suicide  precautions.  ICU  Psych consult  Will need placement  PEC         Pain in both feet  Supportive care.       Folate deficiency  Continue Folic acid supplementations      Clostridium difficile infection  Contact isolation.   On PO Vancomycin 125 mg q 6 hrs Day # 6.       Anemia  Follow CBC      Depression  PEC'd, awaiting inpatient Psych placement. Continue seroquel.        Hepatitis C virus infection cured after antiviral drug therapy  Noted, trend LFTs.       Nicotine abuse  Smoking cessation counseling performed. Dangers of cigarette smoking were reviewed with patient in detail and patient was encouraged to quit. Nicotine replacement options were discussed for > 10 minutes.        Polysubstance dependence including opioid type drug, episodic abuse, with delirium  Noted.      D/W CM regarding DC planning.   VTE Risk Mitigation (From admission, onward)           Ordered     enoxaparin injection 40 mg  Every 24 hours (non-standard times)         02/21/23 1151     IP VTE LOW RISK PATIENT  Once         02/19/23 1822                    Discharge Planning   FLORINA: 3/4/2023     Code Status: Full Code   Is the patient medically ready for discharge?:     Reason for patient still in hospital (select all that apply): Patient trending condition  Discharge Plan A: Psychiatric hospital   Discharge Delays: (!) Post-Acute Set-up              Berenice Fox MD  Department of Hospital Medicine   [facility]

## 2023-03-02 NOTE — CARE UPDATE
03/01/23 2102   Patient Assessment/Suction   Level of Consciousness (AVPU) alert   Respiratory Effort Unlabored   Expansion/Accessory Muscles/Retractions no use of accessory muscles   All Lung Fields Breath Sounds clear   Rhythm/Pattern, Respiratory no shortness of breath reported   Cough Frequency no cough   PRE-TX-O2   Device (Oxygen Therapy) room air   SpO2 96 %   Pulse Oximetry Type Intermittent   $ Pulse Oximetry - Multiple Charge Pulse Oximetry - Multiple   Pulse 75   Resp 15   Positioning   Head of Bed (HOB) Positioning HOB at 30 degrees   Aerosol Therapy   $ Aerosol Therapy Charges Aerosol Treatment   Daily Review of Necessity (SVN) completed   Respiratory Treatment Status (SVN) given   Treatment Route (SVN) mask   Patient Position (SVN) HOB elevated;semi-Gerrad's   Post Treatment Assessment (SVN) breath sounds improved   Signs of Intolerance (SVN) none   Education   $ Education Bronchodilator;15 min   Respiratory Evaluation   $ Care Plan Tech Time 15 min   $ Eval/Re-eval Charges Re-evaluation

## 2023-03-03 VITALS
TEMPERATURE: 98 F | WEIGHT: 184.5 LBS | HEART RATE: 96 BPM | HEIGHT: 72 IN | SYSTOLIC BLOOD PRESSURE: 155 MMHG | DIASTOLIC BLOOD PRESSURE: 90 MMHG | BODY MASS INDEX: 24.99 KG/M2 | RESPIRATION RATE: 17 BRPM | OXYGEN SATURATION: 97 %

## 2023-03-03 LAB — VIT B1 BLD-SCNC: 72.6 NMOL/L (ref 66.5–200)

## 2023-03-03 PROCEDURE — 94640 AIRWAY INHALATION TREATMENT: CPT

## 2023-03-03 PROCEDURE — 63600175 PHARM REV CODE 636 W HCPCS: Performed by: INTERNAL MEDICINE

## 2023-03-03 PROCEDURE — 94761 N-INVAS EAR/PLS OXIMETRY MLT: CPT

## 2023-03-03 PROCEDURE — 25000003 PHARM REV CODE 250: Performed by: NURSE PRACTITIONER

## 2023-03-03 PROCEDURE — 94799 UNLISTED PULMONARY SVC/PX: CPT

## 2023-03-03 PROCEDURE — 12000002 HC ACUTE/MED SURGE SEMI-PRIVATE ROOM

## 2023-03-03 PROCEDURE — 25000003 PHARM REV CODE 250: Performed by: INTERNAL MEDICINE

## 2023-03-03 PROCEDURE — 99900035 HC TECH TIME PER 15 MIN (STAT)

## 2023-03-03 PROCEDURE — 25000242 PHARM REV CODE 250 ALT 637 W/ HCPCS: Performed by: INTERNAL MEDICINE

## 2023-03-03 PROCEDURE — 99900031 HC PATIENT EDUCATION (STAT)

## 2023-03-03 RX ORDER — HYDROCODONE BITARTRATE AND ACETAMINOPHEN 7.5; 325 MG/1; MG/1
1 TABLET ORAL EVERY 6 HOURS PRN
Status: DISCONTINUED | OUTPATIENT
Start: 2023-03-03 | End: 2023-03-04 | Stop reason: HOSPADM

## 2023-03-03 RX ORDER — CLONAZEPAM 1 MG/1
1 TABLET ORAL 2 TIMES DAILY PRN
Status: DISCONTINUED | OUTPATIENT
Start: 2023-03-03 | End: 2023-03-04 | Stop reason: HOSPADM

## 2023-03-03 RX ADMIN — ENOXAPARIN SODIUM 40 MG: 100 INJECTION SUBCUTANEOUS at 08:03

## 2023-03-03 RX ADMIN — GABAPENTIN 800 MG: 100 CAPSULE ORAL at 08:03

## 2023-03-03 RX ADMIN — BUDESONIDE INHALATION 0.5 MG: 0.5 SUSPENSION RESPIRATORY (INHALATION) at 07:03

## 2023-03-03 RX ADMIN — HYDROCODONE BITARTRATE AND ACETAMINOPHEN 1 TABLET: 7.5; 325 TABLET ORAL at 07:03

## 2023-03-03 RX ADMIN — HYDROCODONE BITARTRATE AND ACETAMINOPHEN 1 TABLET: 7.5; 325 TABLET ORAL at 12:03

## 2023-03-03 RX ADMIN — TRAMADOL HYDROCHLORIDE 50 MG: 50 TABLET, COATED ORAL at 12:03

## 2023-03-03 RX ADMIN — PANTOPRAZOLE SODIUM 40 MG: 40 TABLET, DELAYED RELEASE ORAL at 06:03

## 2023-03-03 RX ADMIN — CLONAZEPAM 1 MG: 1 TABLET ORAL at 12:03

## 2023-03-03 RX ADMIN — BUDESONIDE INHALATION 0.5 MG: 0.5 SUSPENSION RESPIRATORY (INHALATION) at 09:03

## 2023-03-03 RX ADMIN — VANCOMYCIN HYDROCHLORIDE 125 MG: KIT at 09:03

## 2023-03-03 RX ADMIN — CLONAZEPAM 1 MG: 1 TABLET ORAL at 07:03

## 2023-03-03 RX ADMIN — ARFORMOTEROL TARTRATE 15 MCG: 15 SOLUTION RESPIRATORY (INHALATION) at 08:03

## 2023-03-03 RX ADMIN — FOLIC ACID 1 MG: 1 TABLET ORAL at 09:03

## 2023-03-03 RX ADMIN — TRAMADOL HYDROCHLORIDE 50 MG: 50 TABLET, COATED ORAL at 06:03

## 2023-03-03 RX ADMIN — LEVOFLOXACIN 500 MG: 250 TABLET, FILM COATED ORAL at 06:03

## 2023-03-03 RX ADMIN — VANCOMYCIN HYDROCHLORIDE 125 MG: KIT at 02:03

## 2023-03-03 RX ADMIN — ARFORMOTEROL TARTRATE 15 MCG: 15 SOLUTION RESPIRATORY (INHALATION) at 07:03

## 2023-03-03 RX ADMIN — VANCOMYCIN HYDROCHLORIDE 125 MG: KIT at 07:03

## 2023-03-03 RX ADMIN — GABAPENTIN 800 MG: 100 CAPSULE ORAL at 09:03

## 2023-03-03 NOTE — PROGRESS NOTES
"[facility] Medicine  Progress Note    Patient Name: Elder Rosales  MRN: [id number]  Patient Class: IP- Inpatient   Admission Date: 2/19/2023  Length of Stay: 12 days  Attending Physician: Berenice Fox MD  Primary Care Provider: [facility]        Subjective:     Principal Problem:Suicidal ideation        HPI:  Patient is a 43 yo cauc male with hx ETOH, >20y/pack/d Reformed smoker, vertigo, ADHD, IVDU, Polysubstance abused,treated Hep c . He reports going camping and fishing 3 days ago and experienced vertigo, abdominal pain, N/V, falling and imbalance, hearing voices in his head and talking to himself. He report dark black stools for 4 days however hbg is stable. He states, " he stop use to smoke a pack/d, and drink 12-15 beers a day but stopped a month ago". While in the ED reported suicidal thoughts and depression and asked to speak with a psychiatrist. He was evaluated by telepsych. On assessment patient was fidgeting, difficulty focusing, not staying on track with questions asked and gaurded to left hip and back. He denied drug use however past documentation states iv drug user,and  polysubstance abuse. He denies chest pain, dysuria, fever, and chills.     Hospitalist asked to admit for hyponatermia , pancreatitis, enterocolitis, abd ct abnormality. Nephrology, GI consulted in ED.      Overview/Hospital Course:  Patient with a history of alcoholism, smoker, vertigo, history of IVDU/polysubstance abuse but denies recent, treated hepatitis-C.  States he went out fishing/camping, believes he had an attack of vertigo, states subsequent day he was generally weak, it took him 4 days to come out of the camp.  States he was having dark stool.  He presented to the ED, expressed suicidal ideation on background history of depression, he was seen by tele psych LEIF Adams, recommends Seroquel 100 q.h.s., Remeron 15 mg q.h.s. Admission labs with " hyponatremia 120 with severe hypokalemia, transaminitis with elevation of lipase.  He was admitted to the ICU, GI and Nephrology consultation, IV fluid hydration, continuous PPI therapy, esophagram.  On 02/22, esophagram with no evidence of esophageal perforation or tears, starting liquid diet and advancing as tolerated, once medically cleared will need inpatient psychiatric placement.  On 02/22 C diff is positive and started on oral vancomycin. He reports bilateral severe burning feet pain, he is on gabapentin 800 mg t.i.d. at home, states pain is worsening, ultrasound legs no DVT, MRI lumbosacral spine no evidence of nerve compromise, neurology consulted, discussed gabapentin, Lyrica, Cymbalta, patient is requesting IV narcotic medications despite counseling.  On 02/25 did have intermittent fevers up to 100.4, fever workup repeated including UA (negative), chest x-ray with possible lower lung changes and empirically started on Zosyn, blood culture no growth to date, rapid COVID negative, procalcitonin 0.78.  On 2/26 continues with irritability and requesting IV narcotics despite counseling on feet pain, seen and examined with RN and sitter in room, using profanity, transfer care to another hospitalist as discussed with patient as feels pain not being appropriately managed      Interval History: PEC'D, diarrhea has resolved, Inpatient Psych facility can not accept until C. Diff treatment completed.     Review of Systems   Constitutional:  Positive for fever.   Respiratory:  Negative for cough.    Gastrointestinal:  Negative for nausea and vomiting.   Genitourinary:  Negative for difficulty urinating.   Psychiatric/Behavioral:  Positive for dysphoric mood.    Objective:     Vital Signs (Most Recent):  Temp: 98.2 °F (36.8 °C) (03/03/23 0722)  Pulse: 94 (03/03/23 0722)  Resp: 18 (03/03/23 0722)  BP: 136/88 (03/03/23 0722)  SpO2: 99 % (03/03/23 0722)   Vital Signs (24h Range):  Temp:  [97.9 °F (36.6 °C)-99.5 °F (37.5  °C)] 98.2 °F (36.8 °C)  Pulse:  [69-97] 94  Resp:  [16-20] 18  SpO2:  [94 %-99 %] 99 %  BP: (117-147)/(72-99) 136/88     Weight: 83.7 kg (184 lb 8.4 oz)  Body mass index is 25.03 kg/m².  No intake or output data in the 24 hours ending 03/03/23 0734     Physical Exam  Vitals and nursing note reviewed.   Constitutional:       Comments: Lying in bed, initially sleeping but did awaken   HENT:      Head: Normocephalic and atraumatic.      Mouth/Throat:      Mouth: Mucous membranes are moist.      Comments: No oral thrush  Cardiovascular:      Rate and Rhythm: Normal rate and regular rhythm.   Pulmonary:      Comments: On room air, no accessory muscle use, no wheeze or crackles  Abdominal:      General: Bowel sounds are normal.      Palpations: Abdomen is soft.      Tenderness: There is no abdominal tenderness.   Neurological:      Mental Status: He is oriented to person, place, and time.      Comments: On touching feet reporting pain   Psychiatric:      Comments: Irritable, using profanities       Significant Labs: All pertinent labs within the past 24 hours have been reviewed.  CBC: No results for input(s): WBC, HGB, HCT, PLT in the last 48 hours.  CMP: No results for input(s): NA, K, CL, CO2, GLU, BUN, CREATININE, CALCIUM, PROT, ALBUMIN, BILITOT, ALKPHOS, AST, ALT, ANIONGAP, EGFRNONAA in the last 48 hours.    Invalid input(s): EST[place]    Significant Imaging:   Imaging Results              US Abdomen Limited (Final result)  Result time 02/19/23 17:11:09      Final result by Naren Mendez MD (02/19/23 17:11:09)                   Narrative:    Abdominal ultrasound Limited    CLINICAL DATA: Hyperbilirubinemia    FINDINGS: Sonographic assessment targeted to the right upper quadrant was performed. The pancreas and midline retroperitoneum are obscured by bowel gas. Visualization of the liver is limited due to poor penetration. The liver is echogenic compatible with fatty infiltration. No mass is identified.  Hepatopedal flow is noted within the portal vein. The gallbladder is mildly distended. No gallstones are identified. Common bile duct measures 5 mm.    The right kidney is normal in appearance. No free fluid is identified.    IMPRESSION:  1. Exam is limited as noted above.  2. Fatty infiltration of the liver.  3. No evidence of cholecystitis or obstruction.    Electronically signed by:  Naren Mendez MD  2/19/2023 5:11 PM [facility] Workstation: [id number]                                     CT Chest Without Contrast (Final result)  Result time 02/19/23 16:23:48      Final result by Naren Mendez MD (02/19/23 16:23:48)                   Narrative:    CT chest without contrast    CLINICAL DATA: Pneumomediastinum    CMS MANDATED QUALITY DATA - CT RADIATION  436    All CT scans at this facility utilize dose modulation, iterative reconstruction, and/or weight based dosing when appropriate to reduce radiation dose to as low as reasonably achievable.    Findings: Thin section axial noncontrast images were obtained from the thoracic inlet to the adrenal glands, following the administration of water-soluble oral contrast.    Again noted are findings of pneumomediastinum, with multiple bubbles of mediastinal air adjacent to the esophagus and left main bronchus. There is no contrast extravasation to indicate esophageal perforation. No pleural effusions are demonstrated. There is mild circumferential wall thickening of the lower two thirds of the thoracic esophagus suggesting possible esophagitis. Correlate clinically.    Heart size is normal. The thoracic aorta is normal in caliber. No mediastinal lymphadenopathy is identified.    There are no pulmonary infiltrates or mass lesions. No pneumothorax is demonstrated.    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    IMPRESSION:  1. There is no identifiable extravasation of ingested water-soluble oral contrast material to indicate  esophageal perforation. There is no pathologic mediastinal fluid collection or pleural effusion. If there is persistent clinical suspicion of esophageal perforation, endoscopy should be considered.  2. Mild pneumomediastinum, with air primarily around the left main bronchus and in a paraesophageal distribution    Electronically signed by:  Naren Mendez MD  2/19/2023 4:23 PM [facility] Workstation: [id number]                                     CT Abdomen Pelvis With Contrast (Final result)  Result time 02/19/23 14:46:17      Final result by Naren Mendez MD (02/19/23 14:46:17)                   Narrative:    CT ABDOMEN AND PELVIS WITH CONTRAST    HISTORY: Abdominal pain    CMS MANDATED QUALITY DATA - CT RADIATION  436    All CT scans at this facility utilize dose modulation, iterative reconstruction, and/or weight based dosing when appropriate to reduce radiation dose to as low as reasonably achievable    FINDINGS:    Post infusion images were obtained from the lung bases to the pubic symphysis. 100 cc nonionic contrast was administered for the examination.    The lung bases are unremarkable. Focal pneumomediastinum is noted, with multiple air bubbles noted posterior to the esophagus. There is mild circumferential wall thickening of the esophagus distally which could be on the basis of underdistention or esophagitis.    There is marked hepatic steatosis. The gallbladder is mildly dilated. Gallbladder lumen is hyperdense in appearance, likely on the basis of hyperdense bile. Biliary tree is nondilated. The spleen has a normal appearance. There is subtle fat stranding adjacent to the pancreatic tail. Correlate with amylase and lipase levels. No pancreatic mass is identified. The adrenal glands are normal. The bilateral kidneys are unremarkable. The abdominal aorta is normal in caliber.    The colon appears slightly thick-walled throughout its course, however this may be related to incomplete distention. Slightly  thick-walled appearance of ileal loops is also noted. No free air or free fluid is identified.    Images of the pelvis demonstrate a normal urinary bladder. There is no free fluid or lymphadenopathy. No acute osseous abnormalities are identified.    IMPRESSION:      1. Subtle fat stranding adjacent to the pancreatic tail suspicious for early or mild pancreatitis. Correlate with amylase and lipase levels.  2. Focal pneumomediastinum, with multiple air bubbles noted dorsal to the distal esophagus. The esophagus is slightly thick-walled which could be on the basis of esophagitis or incomplete distention. While esophageal perforation is felt unlikely, if this is a strong clinical concern, CT chest following water-soluble oral contrast administration could be performed.  3. Severe hepatic steatosis. Hyperdense appearance of the gallbladder is likely on the basis of hyperdense bile.  4. Slightly thick-walled appearance of the colon and of the ileum. Correlate clinically for enterocolitis. Liver disease/hypoalbuminemia could also give this same appearance, and wall thickening of the colon may be in part related to incomplete distention.  5. Additional findings as above.    Electronically signed by:  Naren Mendez MD  2/19/2023 2:46 PM [facility] Workstation: [id number]                                     CT Head Without Contrast (Final result)  Result time 02/19/23 14:31:41      Final result by Naren Mendez MD (02/19/23 14:31:41)                   Narrative:    CT HEAD WITHOUT CONTRAST    CMS MANDATED QUALITY DATA - CT RADIATION  436    All CT scans at this facility utilize dose modulation, iterative reconstruction, and/or weight based dosing when appropriate to reduce radiation dose to as low as reasonably achievable    Clinical data: Dizziness. Comparison to December 2018.    FINDINGS: Noninfusion images were obtained from the skull base to the vertex. There is no intracranial mass, hemorrhage, or midline shift.  Ventricles and sulci are normal. There are no pathologic extra-axial fluid collections. There is no evidence of ischemic change or edema. Cerebellum and brainstem are normal.    The calvarium is intact.Mild mucoperiosteal thickening in the left maxillary sinus is consistent with chronic sinusitis, improved compared to the prior study.    IMPRESSION:    1. Normal CT appearance of the brain.  2. Mild chronic left maxillary sinusitis, improved compared to 2018.    Electronically signed by:  Naren Mendez MD  2/19/2023 2:31 PM [facility] Workstation: [id number]                                     X-Ray Hip 2 or 3 views Left (with Pelvis when performed) (Final result)  Result time 02/19/23 10:42:16      Final result by Naren Mendez MD (02/19/23 10:42:16)                   Narrative:    Left hip with AP pelvis    CLINICAL DATA: Trauma, pain    FINDINGS: 3 views are negative for fracture, dislocation, or osseous destructive lesion. No significant arthritic change is identified. Soft tissues are unremarkable.    IMPRESSION:  1. No radiographic abnormalities.    Electronically signed by:  Naren Mendez MD  2/19/2023 10:42 AM [facility] Workstation: [id number]                                     X-Ray Chest AP Portable (Final result)  Result time 02/19/23 10:41:34      Final result by Naren Mendez MD (02/19/23 10:41:34)                   Narrative:    Chest single view    Clinical data:Chest pain. Comparison to January 2022.    FINDINGS: AP view of the chest demonstrates no cardiac, pulmonary, or osseous abnormalities.    IMPRESSION:  1. Normal chest single view.    Electronically signed by:  Naren Mendez MD  2/19/2023 10:41 AM [facility] Workstation: [id number]                                        Assessment/Plan:      * Suicidal ideation  Suicide precautions.  ICU  Psych consult  Will need placement  PEC         Pain in both feet  Supportive care.       Folate deficiency  Continue Folic acid  supplementations      Clostridium difficile infection  Contact isolation.   On PO Vancomycin 125 mg q 6 hrs Day # 6.       Anemia  Follow CBC      Depression  PEC'd, awaiting inpatient Psych placement. Continue seroquel.        Hepatitis C virus infection cured after antiviral drug therapy  Noted, trend LFTs.       Nicotine abuse  Smoking cessation counseling performed. Dangers of cigarette smoking were reviewed with patient in detail and patient was encouraged to quit. Nicotine replacement options were discussed for > 10 minutes.        Polysubstance dependence including opioid type drug, episodic abuse, with delirium  Noted.      D/W CM, patient to be accepted at inpatient Psych facility tomorrow.   VTE Risk Mitigation (From admission, onward)           Ordered     enoxaparin injection 40 mg  Every 24 hours (non-standard times)         02/21/23 1151     IP VTE LOW RISK PATIENT  Once         02/19/23 1822                    Discharge Planning   FLORINA: 3/4/2023     Code Status: Full Code   Is the patient medically ready for discharge?:     Reason for patient still in hospital (select all that apply): Pending disposition  Discharge Plan A: Psychiatric hospital   Discharge Delays: (!) Post-Acute Set-up              Berenice Fox MD  Department of Hospital Medicine   [facility]

## 2023-03-03 NOTE — PLAN OF CARE
03/03/23 0858   Patient Assessment/Suction   Level of Consciousness (AVPU) alert   Respiratory Effort Unlabored;Normal   Expansion/Accessory Muscles/Retractions no use of accessory muscles   All Lung Fields Breath Sounds clear;diminished   Rhythm/Pattern, Respiratory pattern regular;depth regular   Cough Frequency no cough   PRE-TX-O2   Device (Oxygen Therapy) room air   SpO2 97 %   Pulse Oximetry Type Intermittent   $ Pulse Oximetry - Multiple Charge Pulse Oximetry - Multiple   Pulse 80   Resp 14   Aerosol Therapy   $ Aerosol Therapy Charges Aerosol Treatment   Daily Review of Necessity (SVN) completed   Respiratory Treatment Status (SVN) given   Treatment Route (SVN) mask;oxygen   Patient Position (SVN) sitting on edge of bed   Post Treatment Assessment (SVN) breath sounds improved   Signs of Intolerance (SVN) none   Breath Sounds Post-Respiratory Treatment   Throughout All Fields Post-Treatment All Fields   Throughout All Fields Post-Treatment aeration increased   Post-treatment Heart Rate (beats/min) 82   Post-treatment Resp Rate (breaths/min) 20   Education   $ Education Bronchodilator;15 min   Respiratory Evaluation   $ Care Plan Tech Time 15 min   $ Eval/Re-eval Charges Re-evaluation   Evaluation For Re-Eval 5+ day

## 2023-03-03 NOTE — SUBJECTIVE & OBJECTIVE
Interval History: PEC'D, diarrhea much better. Inpatient Psych facility can not accept until C. Diff treatment completed.     Review of Systems   Constitutional:  Positive for fever.   Respiratory:  Negative for cough.    Gastrointestinal:  Negative for nausea and vomiting.   Genitourinary:  Negative for difficulty urinating.   Psychiatric/Behavioral:  Positive for dysphoric mood.    Objective:     Vital Signs (Most Recent):  Temp: 98.2 °F (36.8 °C) (03/03/23 0722)  Pulse: 94 (03/03/23 0722)  Resp: 18 (03/03/23 0722)  BP: 136/88 (03/03/23 0722)  SpO2: 99 % (03/03/23 0722)   Vital Signs (24h Range):  Temp:  [97.9 °F (36.6 °C)-99.5 °F (37.5 °C)] 98.2 °F (36.8 °C)  Pulse:  [69-97] 94  Resp:  [16-20] 18  SpO2:  [94 %-99 %] 99 %  BP: (117-147)/(72-99) 136/88     Weight: 83.7 kg (184 lb 8.4 oz)  Body mass index is 25.03 kg/m².  No intake or output data in the 24 hours ending 03/03/23 0734     Physical Exam  Vitals and nursing note reviewed.   Constitutional:       Comments: Lying in bed, initially sleeping but did awaken   HENT:      Head: Normocephalic and atraumatic.      Mouth/Throat:      Mouth: Mucous membranes are moist.      Comments: No oral thrush  Cardiovascular:      Rate and Rhythm: Normal rate and regular rhythm.   Pulmonary:      Comments: On room air, no accessory muscle use, no wheeze or crackles  Abdominal:      General: Bowel sounds are normal.      Palpations: Abdomen is soft.      Tenderness: There is no abdominal tenderness.   Neurological:      Mental Status: He is oriented to person, place, and time.      Comments: On touching feet reporting pain   Psychiatric:      Comments: Irritable, using profanities       Significant Labs: All pertinent labs within the past 24 hours have been reviewed.  CBC: No results for input(s): WBC, HGB, HCT, PLT in the last 48 hours.  CMP: No results for input(s): NA, K, CL, CO2, GLU, BUN, CREATININE, CALCIUM, PROT, ALBUMIN, BILITOT, ALKPHOS, AST, ALT, ANIONGAP, EGFRNONAA  in the last 48 hours.    Invalid input(s): [place]    Significant Imaging:   Imaging Results              US Abdomen Limited (Final result)  Result time 02/19/23 17:11:09      Final result by Naren Mendez MD (02/19/23 17:11:09)                   Narrative:    Abdominal ultrasound Limited    CLINICAL DATA: Hyperbilirubinemia    FINDINGS: Sonographic assessment targeted to the right upper quadrant was performed. The pancreas and midline retroperitoneum are obscured by bowel gas. Visualization of the liver is limited due to poor penetration. The liver is echogenic compatible with fatty infiltration. No mass is identified. Hepatopedal flow is noted within the portal vein. The gallbladder is mildly distended. No gallstones are identified. Common bile duct measures 5 mm.    The right kidney is normal in appearance. No free fluid is identified.    IMPRESSION:  1. Exam is limited as noted above.  2. Fatty infiltration of the liver.  3. No evidence of cholecystitis or obstruction.    Electronically signed by:  Naren Mendez MD  2/19/2023 5:11 PM CST Workstation: [id number]                                     CT Chest Without Contrast (Final result)  Result time 02/19/23 16:23:48      Final result by Naren Mendez MD (02/19/23 16:23:48)                   Narrative:    CT chest without contrast    CLINICAL DATA: Pneumomediastinum    CMS MANDATED QUALITY DATA - CT RADIATION  436    All CT scans at this facility utilize dose modulation, iterative reconstruction, and/or weight based dosing when appropriate to reduce radiation dose to as low as reasonably achievable.    Findings: Thin section axial noncontrast images were obtained from the thoracic inlet to the adrenal glands, following the administration of water-soluble oral contrast.    Again noted are findings of pneumomediastinum, with multiple bubbles of mediastinal air adjacent to the esophagus and left main bronchus. There is no contrast extravasation  to indicate esophageal perforation. No pleural effusions are demonstrated. There is mild circumferential wall thickening of the lower two thirds of the thoracic esophagus suggesting possible esophagitis. Correlate clinically.    Heart size is normal. The thoracic aorta is normal in caliber. No mediastinal lymphadenopathy is identified.    There are no pulmonary infiltrates or mass lesions. No pneumothorax is demonstrated.    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    IMPRESSION:  1. There is no identifiable extravasation of ingested water-soluble oral contrast material to indicate esophageal perforation. There is no pathologic mediastinal fluid collection or pleural effusion. If there is persistent clinical suspicion of esophageal perforation, endoscopy should be considered.  2. Mild pneumomediastinum, with air primarily around the left main bronchus and in a paraesophageal distribution    Electronically signed by:  Naren Mendez MD  2/19/2023 4:23 PM CST Workstation: [id number]                                     CT Abdomen Pelvis With Contrast (Final result)  Result time 02/19/23 14:46:17      Final result by aNren Mendez MD (02/19/23 14:46:17)                   Narrative:    CT ABDOMEN AND PELVIS WITH CONTRAST    HISTORY: Abdominal pain    CMS MANDATED QUALITY DATA - CT RADIATION  436    All CT scans at this facility utilize dose modulation, iterative reconstruction, and/or weight based dosing when appropriate to reduce radiation dose to as low as reasonably achievable    FINDINGS:    Post infusion images were obtained from the lung bases to the pubic symphysis. 100 cc nonionic contrast was administered for the examination.    The lung bases are unremarkable. Focal pneumomediastinum is noted, with multiple air bubbles noted posterior to the esophagus. There is mild circumferential wall thickening of the esophagus distally which could be on the basis of  underdistention or esophagitis.    There is marked hepatic steatosis. The gallbladder is mildly dilated. Gallbladder lumen is hyperdense in appearance, likely on the basis of hyperdense bile. Biliary tree is nondilated. The spleen has a normal appearance. There is subtle fat stranding adjacent to the pancreatic tail. Correlate with amylase and lipase levels. No pancreatic mass is identified. The adrenal glands are normal. The bilateral kidneys are unremarkable. The abdominal aorta is normal in caliber.    The colon appears slightly thick-walled throughout its course, however this may be related to incomplete distention. Slightly thick-walled appearance of ileal loops is also noted. No free air or free fluid is identified.    Images of the pelvis demonstrate a normal urinary bladder. There is no free fluid or lymphadenopathy. No acute osseous abnormalities are identified.    IMPRESSION:      1. Subtle fat stranding adjacent to the pancreatic tail suspicious for early or mild pancreatitis. Correlate with amylase and lipase levels.  2. Focal pneumomediastinum, with multiple air bubbles noted dorsal to the distal esophagus. The esophagus is slightly thick-walled which could be on the basis of esophagitis or incomplete distention. While esophageal perforation is felt unlikely, if this is a strong clinical concern, CT chest following water-soluble oral contrast administration could be performed.  3. Severe hepatic steatosis. Hyperdense appearance of the gallbladder is likely on the basis of hyperdense bile.  4. Slightly thick-walled appearance of the colon and of the ileum. Correlate clinically for enterocolitis. Liver disease/hypoalbuminemia could also give this same appearance, and wall thickening of the colon may be in part related to incomplete distention.  5. Additional findings as above.    Electronically signed by:  Naren Mendez MD  2/19/2023 2:46 PM [facility] Workstation: [id number]                                      CT Head Without Contrast (Final result)  Result time 02/19/23 14:31:41      Final result by Naren Mendez MD (02/19/23 14:31:41)                   Narrative:    CT HEAD WITHOUT CONTRAST    CMS MANDATED QUALITY DATA - CT RADIATION  436    All CT scans at this facility utilize dose modulation, iterative reconstruction, and/or weight based dosing when appropriate to reduce radiation dose to as low as reasonably achievable    Clinical data: Dizziness. Comparison to December 2018.    FINDINGS: Noninfusion images were obtained from the skull base to the vertex. There is no intracranial mass, hemorrhage, or midline shift. Ventricles and sulci are normal. There are no pathologic extra-axial fluid collections. There is no evidence of ischemic change or edema. Cerebellum and brainstem are normal.    The calvarium is intact.Mild mucoperiosteal thickening in the left maxillary sinus is consistent with chronic sinusitis, improved compared to the prior study.    IMPRESSION:    1. Normal CT appearance of the brain.  2. Mild chronic left maxillary sinusitis, improved compared to 2018.    Electronically signed by:  Naren Mendez MD  2/19/2023 2:31 PM CST Workstation: [id number]                                     X-Ray Hip 2 or 3 views Left (with Pelvis when performed) (Final result)  Result time 02/19/23 10:42:16      Final result by Naren Mendez MD (02/19/23 10:42:16)                   Narrative:    Left hip with AP pelvis    CLINICAL DATA: Trauma, pain    FINDINGS: 3 views are negative for fracture, dislocation, or osseous destructive lesion. No significant arthritic change is identified. Soft tissues are unremarkable.    IMPRESSION:  1. No radiographic abnormalities.    Electronically signed by:  Naren Mendez MD  2/19/2023 10:42 AM CST Workstation: [id number]                                     X-Ray Chest AP Portable (Final result)  Result time 02/19/23 10:41:34      Final result by Naren Mendez  MD (02/19/23 10:41:34)                   Narrative:    Chest single view    Clinical data:Chest pain. Comparison to January 2022.    FINDINGS: AP view of the chest demonstrates no cardiac, pulmonary, or osseous abnormalities.    IMPRESSION:  1. Normal chest single view.    Electronically signed by:  Naren Mendez MD  2/19/2023 10:41 AM [facility] Workstation: [id number]

## 2023-03-04 NOTE — RESPIRATORY THERAPY
03/03/23 1913   Patient Assessment/Suction   Level of Consciousness (AVPU) alert   Respiratory Effort Normal;Unlabored   Expansion/Accessory Muscles/Retractions no retractions;no use of accessory muscles   All Lung Fields Breath Sounds Anterior:;Lateral:;clear;equal bilaterally   Rhythm/Pattern, Respiratory depth regular;pattern regular;unlabored   PRE-TX-O2   Device (Oxygen Therapy) room air   SpO2 100 %   Pulse Oximetry Type Intermittent   $ Pulse Oximetry - Multiple Charge Pulse Oximetry - Multiple   Pulse 97   Resp 16   Positioning   Body Position sitting up in bed   Aerosol Therapy   $ Aerosol Therapy Charges Aerosol Treatment   Daily Review of Necessity (SVN) completed   Respiratory Treatment Status (SVN) given   Treatment Route (SVN) mask;oxygen   Patient Position (SVN) sitting on edge of bed   Post Treatment Assessment (SVN) breath sounds unchanged   Signs of Intolerance (SVN) none   Breath Sounds Post-Respiratory Treatment   Throughout All Fields Post-Treatment All Fields   Throughout All Fields Post-Treatment no change   Post-treatment Heart Rate (beats/min) 98   Post-treatment Resp Rate (breaths/min) 16   Respiratory Evaluation   $ Care Plan Tech Time 15 min   $ Eval/Re-eval Charges Re-evaluation

## 2023-03-04 NOTE — NURSING
Spoke to Evelyne at [facility].Informed  her that per Case management notes patient is cleared to be discharged on tomorrow. Evelyne informed me that if patient is no longer on isolation and has formed stool, the psych facility  is prepared to accept patient tonight.  Message sent to Dr. Doty informing him of this and requesting discharge orders.

## 2023-03-06 NOTE — PLAN OF CARE
"   03/06/23 1211   Final Note   Assessment Type Final Discharge Note   Anticipated Discharge Disposition Psych     Patient discharge to inpatient psych    Per chart review "Patient accepted by [facility] for the service of Dr. Ochoa - 5th floor."  "

## 2023-03-23 NOTE — DISCHARGE SUMMARY
"[facility] Medicine  Discharge Summary      Patient Name: Elder Rosales  MRN: [id number]  LONNIE: [id number]  Patient Class: IP- Inpatient  Admission Date: 2/19/2023  Hospital Length of Stay: 12 days  Discharge Date and Time:  03/23/2023 5:00 PM  Attending Physician: No att. providers found   Discharging Provider: Berenice Fox MD  Primary Care Provider: [facility]    Primary Care Team: Networked reference to record PCT     HPI:   Patient is a 43 yo cauc male with hx ETOH, >20y/pack/d Reformed smoker, vertigo, ADHD, IVDU, Polysubstance abused,treated Hep c . He reports going camping and fishing 3 days ago and experienced vertigo, abdominal pain, N/V, falling and imbalance, hearing voices in his head and talking to himself. He report dark black stools for 4 days however hbg is stable. He states, " he stop use to smoke a pack/d, and drink 12-15 beers a day but stopped a month ago". While in the ED reported suicidal thoughts and depression and asked to speak with a psychiatrist. He was evaluated by telepsych. On assessment patient was fidgeting, difficulty focusing, not staying on track with questions asked and gaurded to left hip and back. He denied drug use however past documentation states iv drug user,and  polysubstance abuse. He denies chest pain, dysuria, fever, and chills.     Hospitalist asked to admit for hyponatermia , pancreatitis, enterocolitis, abd ct abnormality. Nephrology, GI consulted in ED.      * No surgery found *      Hospital Course:   Patient with a history of alcoholism, smoker, vertigo, history of IVDU/polysubstance abuse but denied any recent, treated hepatitis-C.  States he went out fishing/camping, believes he had an attack of vertigo, states subsequent day he was generally weak, it took him 4 days to come out of the camp.  States he was having dark stool.  He presented to the ED, expressed suicidal ideation on background " history of depression, he was seen by tele psych LEIF Adams, recommends Seroquel 100 q.h.s., Remeron 15 mg q.h.s. Admission labs with hyponatremia 120 with severe hypokalemia, transaminitis with elevation of lipase.  He was admitted to the ICU, GI and Nephrology consultation, IV fluid hydration, continuous PPI therapy, esophagram.  On 02/22, esophagram with no evidence of esophageal perforation or tears, starting liquid diet and advancing as tolerated, once medically cleared will need inpatient psychiatric placement.  On 02/22 C diff is positive and started on oral vancomycin. He reports bilateral severe burning feet pain, he is on gabapentin 800 mg t.i.d. at home, states pain is worsening, ultrasound legs no DVT, MRI lumbosacral spine no evidence of nerve compromise, neurology consulted, discussed gabapentin, Lyrica, Cymbalta, patient is requesting IV narcotic medications despite counseling.  On 02/25 did have intermittent fevers up to 100.4, fever workup repeated including UA (negative), chest x-ray with possible lower lung changes and empirically started on Zosyn, blood culture no growth to date, rapid COVID negative, procalcitonin 0.78.  Patient was treated for Clostridium difficile infection with oral vancomycin.  Patient was accepted at inpatient psych facility for further management and care.       Goals of Care Treatment Preferences:  Code Status: Full Code      Consults:   Consults (From admission, onward)        Status Ordering Provider     Inpatient consult to Neurology  Once        Provider:  Henry Cortez MD    Completed JIE AUSTIN     Inpatient consult to Nephrology  Once        Provider:  Tommie Chang MD    Completed ALEX WADSWORTH        Microbiology Results (last 7 days)     Procedure Component Value Units Date/Time    Blood culture [[id number]] Collected: 02/25/23 0826    Order Status: Completed Specimen: Blood Updated: 03/02/23 1032     Blood Culture, Routine No growth after 5 days.         Imaging Results          US Abdomen Limited (Final result)  Result time 02/19/23 17:11:09    Final result by Naren Mendez MD (02/19/23 17:11:09)                 Narrative:    Abdominal ultrasound Limited    CLINICAL DATA: Hyperbilirubinemia    FINDINGS: Sonographic assessment targeted to the right upper quadrant was performed. The pancreas and midline retroperitoneum are obscured by bowel gas. Visualization of the liver is limited due to poor penetration. The liver is echogenic compatible with fatty infiltration. No mass is identified. Hepatopedal flow is noted within the portal vein. The gallbladder is mildly distended. No gallstones are identified. Common bile duct measures 5 mm.    The right kidney is normal in appearance. No free fluid is identified.    IMPRESSION:  1. Exam is limited as noted above.  2. Fatty infiltration of the liver.  3. No evidence of cholecystitis or obstruction.    Electronically signed by:  Naren Mendez MD  2/19/2023 5:11 PM CST Workstation: [id number]                             CT Chest Without Contrast (Final result)  Result time 02/19/23 16:23:48    Final result by Naren Mendez MD (02/19/23 16:23:48)                 Narrative:    CT chest without contrast    CLINICAL DATA: Pneumomediastinum    CMS MANDATED QUALITY DATA - CT RADIATION  436    All CT scans at this facility utilize dose modulation, iterative reconstruction, and/or weight based dosing when appropriate to reduce radiation dose to as low as reasonably achievable.    Findings: Thin section axial noncontrast images were obtained from the thoracic inlet to the adrenal glands, following the administration of water-soluble oral contrast.    Again noted are findings of pneumomediastinum, with multiple bubbles of mediastinal air adjacent to the esophagus and left main bronchus. There is no contrast extravasation to indicate esophageal perforation. No pleural effusions are demonstrated. There is mild  circumferential wall thickening of the lower two thirds of the thoracic esophagus suggesting possible esophagitis. Correlate clinically.    Heart size is normal. The thoracic aorta is normal in caliber. No mediastinal lymphadenopathy is identified.    There are no pulmonary infiltrates or mass lesions. No pneumothorax is demonstrated.    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    IMPRESSION:  1. There is no identifiable extravasation of ingested water-soluble oral contrast material to indicate esophageal perforation. There is no pathologic mediastinal fluid collection or pleural effusion. If there is persistent clinical suspicion of esophageal perforation, endoscopy should be considered.  2. Mild pneumomediastinum, with air primarily around the left main bronchus and in a paraesophageal distribution    Electronically signed by:  Naren Mendez MD  2/19/2023 4:23 PM CST Workstation: [id number]                             CT Abdomen Pelvis With Contrast (Final result)  Result time 02/19/23 14:46:17    Final result by Naren Mendez MD (02/19/23 14:46:17)                 Narrative:    CT ABDOMEN AND PELVIS WITH CONTRAST    HISTORY: Abdominal pain    CMS MANDATED QUALITY DATA - CT RADIATION  436    All CT scans at this facility utilize dose modulation, iterative reconstruction, and/or weight based dosing when appropriate to reduce radiation dose to as low as reasonably achievable    FINDINGS:    Post infusion images were obtained from the lung bases to the pubic symphysis. 100 cc nonionic contrast was administered for the examination.    The lung bases are unremarkable. Focal pneumomediastinum is noted, with multiple air bubbles noted posterior to the esophagus. There is mild circumferential wall thickening of the esophagus distally which could be on the basis of underdistention or esophagitis.    There is marked hepatic steatosis. The gallbladder is mildly dilated.  Gallbladder lumen is hyperdense in appearance, likely on the basis of hyperdense bile. Biliary tree is nondilated. The spleen has a normal appearance. There is subtle fat stranding adjacent to the pancreatic tail. Correlate with amylase and lipase levels. No pancreatic mass is identified. The adrenal glands are normal. The bilateral kidneys are unremarkable. The abdominal aorta is normal in caliber.    The colon appears slightly thick-walled throughout its course, however this may be related to incomplete distention. Slightly thick-walled appearance of ileal loops is also noted. No free air or free fluid is identified.    Images of the pelvis demonstrate a normal urinary bladder. There is no free fluid or lymphadenopathy. No acute osseous abnormalities are identified.    IMPRESSION:      1. Subtle fat stranding adjacent to the pancreatic tail suspicious for early or mild pancreatitis. Correlate with amylase and lipase levels.  2. Focal pneumomediastinum, with multiple air bubbles noted dorsal to the distal esophagus. The esophagus is slightly thick-walled which could be on the basis of esophagitis or incomplete distention. While esophageal perforation is felt unlikely, if this is a strong clinical concern, CT chest following water-soluble oral contrast administration could be performed.  3. Severe hepatic steatosis. Hyperdense appearance of the gallbladder is likely on the basis of hyperdense bile.  4. Slightly thick-walled appearance of the colon and of the ileum. Correlate clinically for enterocolitis. Liver disease/hypoalbuminemia could also give this same appearance, and wall thickening of the colon may be in part related to incomplete distention.  5. Additional findings as above.    Electronically signed by:  Naern Mendez MD  2/19/2023 2:46 PM [facility] Workstation: [id number]                             CT Head Without Contrast (Final result)  Result time 02/19/23 14:31:41    Final result by Naren Mendez,  MD (02/19/23 14:31:41)                 Narrative:    CT HEAD WITHOUT CONTRAST    CMS MANDATED QUALITY DATA - CT RADIATION  436    All CT scans at this facility utilize dose modulation, iterative reconstruction, and/or weight based dosing when appropriate to reduce radiation dose to as low as reasonably achievable    Clinical data: Dizziness. Comparison to December 2018.    FINDINGS: Noninfusion images were obtained from the skull base to the vertex. There is no intracranial mass, hemorrhage, or midline shift. Ventricles and sulci are normal. There are no pathologic extra-axial fluid collections. There is no evidence of ischemic change or edema. Cerebellum and brainstem are normal.    The calvarium is intact.Mild mucoperiosteal thickening in the left maxillary sinus is consistent with chronic sinusitis, improved compared to the prior study.    IMPRESSION:    1. Normal CT appearance of the brain.  2. Mild chronic left maxillary sinusitis, improved compared to 2018.    Electronically signed by:  Naren Mendez MD  2/19/2023 2:31 PM CST Workstation: [id number]                             X-Ray Hip 2 or 3 views Left (with Pelvis when performed) (Final result)  Result time 02/19/23 10:42:16    Final result by Naren Mendez MD (02/19/23 10:42:16)                 Narrative:    Left hip with AP pelvis    CLINICAL DATA: Trauma, pain    FINDINGS: 3 views are negative for fracture, dislocation, or osseous destructive lesion. No significant arthritic change is identified. Soft tissues are unremarkable.    IMPRESSION:  1. No radiographic abnormalities.    Electronically signed by:  Naren Mendez MD  2/19/2023 10:42 AM CST Workstation: [id number]                             X-Ray Chest AP Portable (Final result)  Result time 02/19/23 10:41:34    Final result by Naren Mendez MD (02/19/23 10:41:34)                 Narrative:    Chest single view    Clinical data:Chest pain. Comparison to January  2022.    FINDINGS: AP view of the chest demonstrates no cardiac, pulmonary, or osseous abnormalities.    IMPRESSION:  1. Normal chest single view.    Electronically signed by:  Naren Mendez MD  2/19/2023 10:41 AM [facility] Workstation: [id number]                            Final Active Diagnoses:    Diagnosis Date Noted POA    PRINCIPAL PROBLEM:  Suicidal ideation [R45.851] 02/19/2023 Not Applicable     Chronic    Pain in both feet [M79.671, M79.672] 02/25/2023 Yes     Chronic    Folate deficiency [E53.8] 02/24/2023 Yes     Chronic    Clostridium difficile infection [A49.8] 02/23/2023 Yes    Depression [F32.A] 02/21/2023 Yes     Chronic    Anemia [D64.9] 02/21/2023 Yes     Chronic    Hepatitis C virus infection cured after antiviral drug therapy [Z86.19]  Yes    Nicotine abuse [Z72.0] 09/03/2020 Yes    Polysubstance dependence including opioid type drug, episodic abuse, with delirium [F11.221] 10/04/2017 Yes      Problems Resolved During this Admission:    Diagnosis Date Noted Date Resolved POA    Fever [R50.9] 02/25/2023 02/27/2023 No    Hypomagnesemia [E83.42] 02/25/2023 02/26/2023 No    Hypophosphatemia [E83.39] 02/24/2023 02/26/2023 No    Camila-Portillo tear, suspected [K22.6] 02/21/2023 02/23/2023 Yes    Thrombocytopenia [D69.6] 02/21/2023 02/22/2023 Yes    Acute pancreatitis [K85.90] 02/19/2023 02/22/2023 Yes    Enterocolitis [K52.9] 02/19/2023 02/19/2023 Yes    Hyponatremia [E87.1] 02/19/2023 02/24/2023 Yes    Low blood magnesium [R79.0] 02/19/2023 02/21/2023 Yes    JAIME (acute kidney injury) [N17.9] 02/19/2023 02/21/2023 Yes    Hypokalemia [E87.6] 02/19/2023 02/23/2023 Yes    Chronic hepatitis C virus infection [B18.2] 10/04/2017 02/21/2023 Yes       Discharged Condition: good    Disposition: Psychiatric Hospital    Follow Up:   Follow-up Information     Access [facility]. Schedule an appointment as soon as possible for a visit.    Why: post hospital  discharge follow up  Contact information:  [place] [id number]  [phone]             Hedy Wong MD. Schedule an appointment as soon as possible for a visit.    Specialty: Neurology  Why: evaluation of neuropathy to your feet  Contact information:  [address]  [place] [id number]  [phone]                       Patient Instructions:      Diet Adult Regular     Notify your health care provider if you experience any of the following:  temperature >100.4     Notify your health care provider if you experience any of the following:  persistent nausea and vomiting or diarrhea     Notify your health care provider if you experience any of the following:  severe uncontrolled pain     Notify your health care provider if you experience any of the following:  difficulty breathing or increased cough     Notify your health care provider if you experience any of the following:  increased confusion or weakness     Change dressing (specify)   Order Comments: Bilat hips Clean with chlorhexidine/ns. Pat dry  Apply santyl and cover with mepilex.     Bug bites and scratches  Clean with chlorhexidine/ns. Pat dry. Apply Calmoseptine and cover with mepilex if needed Daily.     Activity as tolerated       Significant Diagnostic Studies: Labs:   CMP No results for input(s): NA, K, CL, CO2, GLU, BUN, CREATININE, CALCIUM, PROT, ALBUMIN, BILITOT, ALKPHOS, AST, ALT, ANIONGAP, ESTGFRAFRICA, EGFRNONAA in the last 48 hours., CBC No results for input(s): WBC, HGB, HCT, PLT in the last 48 hours. and INR   Lab Results   Component Value Date    INR 1.04 08/20/2020    INR 1.1 04/20/2018    INR 1.1 10/04/2017       Pending Diagnostic Studies:     None         Medications:  Reconciled Home Medications:      Medication List      START taking these medications    albuterol 90 mcg/actuation inhaler  Commonly known as: VENTOLIN HFA  Inhale 2 puffs into the lungs every 6 (six) hours as needed for Wheezing. Rescue     folic acid  1 MG tablet  Commonly known as: FOLVITE  Take 1 tablet (1 mg total) by mouth once daily.        CHANGE how you take these medications    clonazePAM 1 MG tablet  Commonly known as: KlonoPIN  Take 1 mg by mouth daily as needed.  What changed: Another medication with the same name was removed. Continue taking this medication, and follow the directions you see here.     gabapentin 800 MG tablet  Commonly known as: NEURONTIN  gabapentin 800 mg tablet   TAKE 1 TABLET BY MOUTH THREE TIMES DAILY  What changed: Another medication with the same name was removed. Continue taking this medication, and follow the directions you see here.     QUEtiapine 100 MG Tab  Commonly known as: SEROQUEL  Take 100 mg by mouth every evening.  What changed: Another medication with the same name was removed. Continue taking this medication, and follow the directions you see here.        CONTINUE taking these medications    meclizine 25 mg tablet  Commonly known as: ANTIVERT  Take 1 tablet (25 mg total) by mouth 3 (three) times daily as needed for Dizziness.        STOP taking these medications    amLODIPine 5 MG tablet  Commonly known as: NORVASC     buprenorphine-naloxone 8-2 8-2 mg Subl  Commonly known as: SUBOXONE     buprenorphine-naloxone 8-2 mg 8-2 mg  Commonly known as: SUBOXONE     buPROPion 150 MG TB24 tablet  Commonly known as: WELLBUTRIN XL     busPIRone 10 MG tablet  Commonly known as: BUSPAR     carvediloL 3.125 MG tablet  Commonly known as: COREG     cetirizine 10 MG tablet  Commonly known as: ZYRTEC     DULoxetine 30 MG capsule  Commonly known as: CYMBALTA     fluticasone propionate 50 mcg/actuation nasal spray  Commonly known as: FLONASE     lisinopriL 10 MG tablet     lisinopriL 5 MG tablet  Commonly known as: PRINIVIL,ZESTRIL     ondansetron 4 MG Tbdl  Commonly known as: ZOFRAN-ODT     POLYTUSSIN DM 1-5-10 mg/5 mL Syrp  Generic drug: dexchlorphen-phenylephrine-DM     traZODone 100 MG tablet  Commonly known as: DESYREL         ASK your doctor about these medications    levoFLOXacin 500 MG tablet  Commonly known as: LEVAQUIN  Take 1 tablet (500 mg total) by mouth before breakfast. for 4 days  Ask about: Should I take this medication?     vancomycin 125 MG capsule  Commonly known as: VANCOCIN  Take 1 capsule (125 mg total) by mouth every 6 (six) hours. for 5 days  Ask about: Should I take this medication?            Indwelling Lines/Drains at time of discharge:   Lines/Drains/Airways     None                 Time spent on the discharge of patient: 33 minutes         Berenice Fox MD  Department of Hospital Medicine  [facility]